# Patient Record
Sex: MALE | Race: WHITE | NOT HISPANIC OR LATINO | Employment: OTHER | URBAN - METROPOLITAN AREA
[De-identification: names, ages, dates, MRNs, and addresses within clinical notes are randomized per-mention and may not be internally consistent; named-entity substitution may affect disease eponyms.]

---

## 2017-01-01 ENCOUNTER — TELEPHONE (OUTPATIENT)
Dept: PULMONOLOGY | Facility: HOSPICE | Age: 77
End: 2017-01-01

## 2017-01-01 ENCOUNTER — OFFICE VISIT (OUTPATIENT)
Dept: CARDIOLOGY | Facility: MEDICAL CENTER | Age: 77
End: 2017-01-01
Payer: COMMERCIAL

## 2017-01-01 ENCOUNTER — HOSPITAL ENCOUNTER (OUTPATIENT)
Dept: RADIOLOGY | Facility: MEDICAL CENTER | Age: 77
End: 2017-10-30
Attending: INTERNAL MEDICINE
Payer: MEDICARE

## 2017-01-01 ENCOUNTER — SLEEP STUDY (OUTPATIENT)
Dept: SLEEP MEDICINE | Facility: MEDICAL CENTER | Age: 77
End: 2017-01-01
Attending: NURSE PRACTITIONER
Payer: MEDICARE

## 2017-01-01 ENCOUNTER — OFFICE VISIT (OUTPATIENT)
Dept: PULMONOLOGY | Facility: HOSPICE | Age: 77
End: 2017-01-01
Payer: MEDICARE

## 2017-01-01 VITALS
TEMPERATURE: 98.6 F | BODY MASS INDEX: 26.86 KG/M2 | WEIGHT: 216 LBS | HEIGHT: 75 IN | OXYGEN SATURATION: 95 % | SYSTOLIC BLOOD PRESSURE: 110 MMHG | DIASTOLIC BLOOD PRESSURE: 64 MMHG | RESPIRATION RATE: 16 BRPM | HEART RATE: 73 BPM

## 2017-01-01 VITALS
OXYGEN SATURATION: 95 % | HEIGHT: 75 IN | SYSTOLIC BLOOD PRESSURE: 104 MMHG | DIASTOLIC BLOOD PRESSURE: 60 MMHG | HEART RATE: 74 BPM

## 2017-01-01 VITALS
SYSTOLIC BLOOD PRESSURE: 122 MMHG | WEIGHT: 215 LBS | TEMPERATURE: 98.5 F | DIASTOLIC BLOOD PRESSURE: 52 MMHG | BODY MASS INDEX: 26.18 KG/M2 | OXYGEN SATURATION: 93 % | HEIGHT: 76 IN | RESPIRATION RATE: 16 BRPM | HEART RATE: 80 BPM

## 2017-01-01 DIAGNOSIS — J45.20 MILD INTERMITTENT ASTHMA WITHOUT COMPLICATION: ICD-10-CM

## 2017-01-01 DIAGNOSIS — J44.9 CHRONIC OBSTRUCTIVE PULMONARY DISEASE, UNSPECIFIED COPD TYPE (HCC): ICD-10-CM

## 2017-01-01 DIAGNOSIS — E78.5 DYSLIPIDEMIA: ICD-10-CM

## 2017-01-01 DIAGNOSIS — J96.11 CHRONIC RESPIRATORY FAILURE WITH HYPOXIA (HCC): ICD-10-CM

## 2017-01-01 DIAGNOSIS — R07.0 THROAT PAIN: ICD-10-CM

## 2017-01-01 DIAGNOSIS — K21.9 GASTROESOPHAGEAL REFLUX DISEASE, ESOPHAGITIS PRESENCE NOT SPECIFIED: ICD-10-CM

## 2017-01-01 DIAGNOSIS — I10 HTN (HYPERTENSION), BENIGN: ICD-10-CM

## 2017-01-01 DIAGNOSIS — I25.119 CORONARY ARTERY DISEASE INVOLVING NATIVE CORONARY ARTERY OF NATIVE HEART WITH ANGINA PECTORIS (HCC): ICD-10-CM

## 2017-01-01 DIAGNOSIS — K22.719 BARRETT'S ESOPHAGUS WITH DYSPLASIA: ICD-10-CM

## 2017-01-01 DIAGNOSIS — I25.84 CORONARY ARTERY DISEASE DUE TO CALCIFIED CORONARY LESION: ICD-10-CM

## 2017-01-01 DIAGNOSIS — R13.19 CONSTANT LOW-GRADE DYSPHAGIA: ICD-10-CM

## 2017-01-01 DIAGNOSIS — I25.10 CORONARY ARTERY DISEASE DUE TO CALCIFIED CORONARY LESION: ICD-10-CM

## 2017-01-01 DIAGNOSIS — G47.33 OSA (OBSTRUCTIVE SLEEP APNEA): ICD-10-CM

## 2017-01-01 DIAGNOSIS — I25.10 CORONARY ARTERY DISEASE DUE TO LIPID RICH PLAQUE: ICD-10-CM

## 2017-01-01 DIAGNOSIS — I25.83 CORONARY ARTERY DISEASE DUE TO LIPID RICH PLAQUE: ICD-10-CM

## 2017-01-01 DIAGNOSIS — I10 ESSENTIAL HYPERTENSION: ICD-10-CM

## 2017-01-01 DIAGNOSIS — J43.9 PULMONARY EMPHYSEMA, UNSPECIFIED EMPHYSEMA TYPE (HCC): ICD-10-CM

## 2017-01-01 DIAGNOSIS — R06.09 DYSPNEA ON EXERTION: ICD-10-CM

## 2017-01-01 PROCEDURE — 74220 X-RAY XM ESOPHAGUS 1CNTRST: CPT

## 2017-01-01 PROCEDURE — 99214 OFFICE O/P EST MOD 30 MIN: CPT | Performed by: NURSE PRACTITIONER

## 2017-01-01 PROCEDURE — 99214 OFFICE O/P EST MOD 30 MIN: CPT | Performed by: INTERNAL MEDICINE

## 2017-01-01 PROCEDURE — 700101 HCHG RX REV CODE 250: Performed by: INTERNAL MEDICINE

## 2017-01-01 PROCEDURE — 95811 POLYSOM 6/>YRS CPAP 4/> PARM: CPT | Performed by: INTERNAL MEDICINE

## 2017-01-01 RX ORDER — SULFAMETHOXAZOLE AND TRIMETHOPRIM 800; 160 MG/1; MG/1
TABLET ORAL
Refills: 0 | COMMUNITY
Start: 2017-06-19 | End: 2018-01-01

## 2017-01-01 RX ORDER — ROSUVASTATIN CALCIUM 40 MG/1
40 TABLET, COATED ORAL DAILY
Qty: 90 TAB | Refills: 3 | Status: SHIPPED | OUTPATIENT
Start: 2017-01-01

## 2017-01-01 RX ORDER — FUROSEMIDE 80 MG
80 TABLET ORAL
Refills: 3 | COMMUNITY
Start: 2017-05-08 | End: 2018-01-01

## 2017-01-01 RX ORDER — MULTIVIT-MIN/IRON/FOLIC ACID/K 18-600-40
2000 CAPSULE ORAL EVERY EVENING
COMMUNITY

## 2017-01-01 RX ORDER — POTASSIUM CHLORIDE 750 MG/1
TABLET, FILM COATED, EXTENDED RELEASE ORAL
Refills: 3 | COMMUNITY
Start: 2017-05-04 | End: 2018-01-01

## 2017-01-01 RX ORDER — LEVALBUTEROL TARTRATE 45 UG/1
1 AEROSOL, METERED ORAL EVERY 4 HOURS PRN
Refills: 1 | COMMUNITY
Start: 2017-05-17 | End: 2018-01-01

## 2017-01-01 RX ORDER — METHYLPREDNISOLONE 4 MG/1
TABLET ORAL
Qty: 21 TAB | Refills: 0 | Status: SHIPPED | OUTPATIENT
Start: 2017-01-01 | End: 2017-01-01

## 2017-01-01 RX ORDER — BENAZEPRIL HYDROCHLORIDE 20 MG/1
20 TABLET ORAL EVERY EVENING
Qty: 90 TAB | Refills: 3 | Status: SHIPPED | OUTPATIENT
Start: 2017-01-01

## 2017-01-01 RX ADMIN — BARIUM SULFATE 700 MG: 700 TABLET ORAL at 14:30

## 2017-01-01 ASSESSMENT — MINNESOTA LIVING WITH HEART FAILURE QUESTIONNAIRE (MLHF)
TOTAL_SCORE: 48
LOSS OF SELF CONTROL IN YOUR LIFE: 2
MAKING YOU SHORT OF BREATH: 4
DIFFICULTY SOCIALIZING WITH FAMILY OR FRIENDS: 2
MAKING YOU STAY IN A HOSPITAL: 0
DIFFICULTY WITH SEXUAL ACTIVITIES: 0
MAKING YOU WORRY: 2
DIFFICULTY WITH RECREATIONAL PASTIMES, SPORTS, HOBBIES: 0
FEELING LIKE A BURDEN TO FAMILY AND FRIENDS: 1
WORKING AROUND THE HOUSE OR YARD DIFFICULT: 4
WALKING ABOUT OR CLIMBING STAIRS DIFFICULT: 4
HAVING TO SIT OR LIE DOWN DURING THE DAY: 3
DIFFICULTY TO CONCENTRATE OR REMEMBERING THINGS: 3
DIFFICULTY SLEEPING WELL AT NIGHT: 4
DIFFICULTY GOING AWAY FROM HOME: 4
GIVING YOU SIDE EFFECTS FROM TREATMENTS: 3
SWELLING IN ANKLES OR LEGS: 5
EATING LESS FOODS YOU LIKE: 0
TIRED, FATIGUED OR LOW ON ENERGY: 4
DIFFICULTY WORKING TO EARN A LIVING: 0
COSTING YOU MONEY FOR MEDICAL CARE: 3
MAKING YOU FEEL DEPRESSED: 0

## 2017-03-02 ENCOUNTER — TELEPHONE (OUTPATIENT)
Dept: CARDIOLOGY | Facility: MEDICAL CENTER | Age: 77
End: 2017-03-02

## 2017-03-02 NOTE — TELEPHONE ENCOUNTER
----- Message from Glo Ballard sent at 3/2/2017  2:10 PM PST -----  Regarding: new meds - advice  Contact: [phone]  MI/charity    Pt calling to ask for new medication advice (several new meds) & if he can safely take them with his cardiac meds.  Please call Abbe at

## 2017-03-02 NOTE — TELEPHONE ENCOUNTER
Called patient. He wanted to let Dr. Dixon know that he was recently prescribed Ibuprofen and Bactrim for an infection in his prostate.    He also has a prescription for a Breo Ellipta inhaler from his pulmonologist. He read the packet that came with the medication, and it said that he shouldn't use the Breo Ellipta inhaler with beta blockers. Patient would like to know if Dr. Dixon thinks that he can safely take Breo Ellipta and his beta blocker (Metoprolol Succinate) together.    Forwarded to Dr. Dixon, please advise. Thank you.    RICH VILLA

## 2017-03-09 ENCOUNTER — OFFICE VISIT (OUTPATIENT)
Dept: CARDIOLOGY | Facility: MEDICAL CENTER | Age: 77
End: 2017-03-09
Payer: COMMERCIAL

## 2017-03-09 ENCOUNTER — TELEPHONE (OUTPATIENT)
Dept: CARDIOLOGY | Facility: MEDICAL CENTER | Age: 77
End: 2017-03-09

## 2017-03-09 VITALS
DIASTOLIC BLOOD PRESSURE: 70 MMHG | SYSTOLIC BLOOD PRESSURE: 130 MMHG | HEART RATE: 76 BPM | HEIGHT: 76 IN | BODY MASS INDEX: 26.55 KG/M2 | WEIGHT: 218 LBS

## 2017-03-09 DIAGNOSIS — I25.10 CORONARY ARTERY DISEASE DUE TO CALCIFIED CORONARY LESION: ICD-10-CM

## 2017-03-09 DIAGNOSIS — E78.5 DYSLIPIDEMIA: ICD-10-CM

## 2017-03-09 DIAGNOSIS — I10 HTN (HYPERTENSION), BENIGN: ICD-10-CM

## 2017-03-09 DIAGNOSIS — I25.84 CORONARY ARTERY DISEASE DUE TO CALCIFIED CORONARY LESION: ICD-10-CM

## 2017-03-09 PROCEDURE — 99214 OFFICE O/P EST MOD 30 MIN: CPT | Performed by: INTERNAL MEDICINE

## 2017-03-09 RX ORDER — BISOPROLOL FUMARATE 5 MG/1
5 TABLET, FILM COATED ORAL DAILY
Qty: 30 TAB | Refills: 11 | Status: SHIPPED | OUTPATIENT
Start: 2017-03-09 | End: 2018-01-01 | Stop reason: SDUPTHER

## 2017-03-09 NOTE — TELEPHONE ENCOUNTER
Mailed patient a letter regarding his medication changes (patient is to discontinue HCTZ and Metoprolol SR, he is to start taking Bisoprolol 5 mg daily).    Left patient a voicemail regarding the above, advised patient via voicemail to call the office with questions.    Called [facility] to discontinue HCTZ and Metoprolol.    RICH VILLA

## 2017-03-09 NOTE — PROGRESS NOTES
Subjective:   Abbe Whitman is a 76 y.o. male who presents today for followup of his coronary artery disease with recurrent pleural effusions, hypertension and hyperlipidemia. He did undergo right pleurodesis. He did develop a left pleural effusion which ultimately caused some chest discomfort and led to an admission at the end of March 2015.    He continues to have difficulty with COPD and asthma. He is concerned about taking Ventolin inhaler along with his beta blocker therapy. He's also had significant difficulty with prostatism and is now on an antibiotic in addition to Flomax. His prostatism has improved somewhat but he has difficulty taking ACTZ because of frequent urination.    He continues to have difficulty with edema. He has dyspnea on exertion about 100 yards at a good day. He's had no PND or orthopnea. He's noted no palpitations or lightheadedness. He does occasionally note some flushing.    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   • CAD (coronary artery disease) 8/31/2011   • Dyslipidemia 8/31/2011   • Ramsay esophagus    • GERD (gastroesophageal reflux disease)    • Dyspnea on exertion 2/19/2013   • Pleural effusion 7/1/2013   • Hypertension    • HTN (hypertension) 8/31/2011   • HTN (hypertension) 2013     pt states well controlled   • Myocardial infarct 2000   • Arrhythmia      a-fib occas   • Heart burn    • Other specified disorder of intestines 2013     constipation needs miralax daily   • CATARACT 2013   • Cold 10/20     12 hrs of diarrhea    • Indigestion    • Unspecified urinary incontinence    • Atypical chest pain 3/16/2015     Past Surgical History   Procedure Laterality Date   • Cataract extraction       both eyes   • Other       right knee orthoscopic    • Multiple coronary artery bypass endo vein harvest  10/13/2009     Performed by JULIA ALCANTARA at SURGERY [facility]   • Maze procedure  10/13/2009      Performed by JULIA ALCANTARA at SURGERY [facility]   • Biopsy general  10/13/2009     Performed by JULIA ALCANTARA at SURGERY [facility]   • Angiogram  2009   • Inguinal hernia repair  9/15/2010     Performed by GRIS GARCÍA at SURGERY [facility]   • Thoracoscopy  10/29/2013     Performed by John H Ganser, M.D. at SURGERY [facility]   • Other cardiac surgery       Family History   Problem Relation Age of Onset   • Other Mother      afib   • Heart Attack Maternal Uncle      History   Smoking status   • Never Smoker    Smokeless tobacco   • Never Used     Allergies   Allergen Reactions   • Prednisone      Swelling in throat   • Ciprofloxacin      Per patient makes skin peel    • Latex      Son says slight skin reaction     Outpatient Encounter Prescriptions as of 3/9/2017   Medication Sig Dispense Refill   • benazepril (LOTENSIN) 20 MG Tab Take 1 Tab by mouth every evening. 90 Tab 3   • hydrochlorothiazide (HYDRODIURIL) 12.5 MG tablet Take 1 Tab by mouth every day. 90 Tab 3   • metoprolol SR (TOPROL XL) 50 MG TABLET SR 24 HR Take 1.5 Tabs by mouth every day. 135 Tab 3   • rosuvastatin (CRESTOR) 40 MG tablet Take 1 Tab by mouth every day. 90 Tab 3   • nitroglycerin (NITROSTAT) 0.4 MG SL Tab Place 1 Tab under tongue as needed for Chest Pain. 25 Tab 5   • Albuterol Sulfate 108 (90 BASE) MCG/ACT AEROSOL POWDER, BREATH ACTIVATED Inhale 90 mcg by mouth every four hours as needed (sob or wheezing). 1 Each 5   • Mometasone Furo-Formoterol Fum (DULERA) 100-5 MCG/ACT Aerosol Inhale 2 Puffs by mouth 2 Times a Day. Use spacer. Rinse mouth after use. 1 Inhaler 0   • Tiotropium Bromide Monohydrate (SPIRIVA RESPIMAT) 1.25 MCG/ACT Aero Soln Inhale 2 Inhalation by mouth every day. 1 Inhaler 5   • aspirin (ASA) 81 MG Chew Tab chewable tablet Take 1 Tab by mouth every day. 90 Tab 3   • lansoprazole (PREVACID) 30 MG CPDR Take 30 mg by mouth every day.     • tamsulosin (FLOMAX) 0.4 MG capsule Take 0.4 mg by mouth 2  "Times a Day.       Facility-Administered Encounter Medications as of 3/9/2017   Medication Dose Route Frequency Provider Last Rate Last Dose   • albuterol (VENTOLIN or PROVENTIL) inhaler 2 Puff  2 Puff Inhalation Q4HRS KALE Jacinto       Objective:   /70 mmHg  Pulse 76  Ht 1.93 m (6' 4\")  Wt 98.884 kg (218 lb)  BMI 26.55 kg/m2    Physical Exam   Neck: No JVD present.   Cardiovascular: Normal rate and regular rhythm.  Exam reveals no gallop.    No murmur heard.  Pulmonary/Chest: Effort normal. He has no rales.   Abdominal: Soft. There is no tenderness.   Musculoskeletal: He exhibits edema (1-2+ pretibial).     Myocardial perfusion scan:  IMPRESSIONS  Normal left ventricular size, ejection fraction, and wall motion.  No evidence of significant jeopardized viable myocardium or prior myocardial   Infarction.  Normal left ventricular wall motion. LV ejection fraction = 53%.  Exam Date: 03/15/2015 10:37    Echo:  CONCLUSIONS  Technically difficult study.   Mildly reduced left ventricular systolic function.  Left ventricular ejection fraction is 45% to 50%.  Mild mitral regurgitation.  Moderate tricuspid regurgitation.  Right ventricular systolic pressure is estimated to be 40 to 45 mmHg.  Exam Date: 03/15/2015     Laboratory from August 22: Laboratory from February 23: Sodium 140, potassium 3.2, bilirubin 1.8. BUN 18 and creatinine 1.3.    Laboratory from December 2016: Total cholesterol 89, triglycerides 28, HDL 36 and LDL 47    Assessment:     1. Coronary artery disease due to calcified coronary lesion: CABG ×5 in 2009     2. HTN (hypertension), benign     3. Dyslipidemia         Medical Decision Making:  Today's Assessment / Status / Plan:     Mr. Whitman continues to have difficulty with dyspnea from his asthma. He is also having difficulty with frequent urination and prostatism. At this time, we will discontinue hydrochlorothiazide and change metoprolol to bisoprolol 5 mg a " day. His lipid status appears to be under good control. He will call if his blood pressures recently greater than 140/85. He will otherwise follow-up in about 2 months. We also discussed a low level walking program.

## 2017-03-09 NOTE — MR AVS SNAPSHOT
"Abbe Whitman   3/9/2017 10:30 AM   Office Visit   MRN: [id number]    Department:  [facility]   Dept Phone:  [phone]    Description:  Male : 1940   Provider:  Armin Dixon M.D.           Reason for Visit     Follow-Up           Allergies as of 3/9/2017     Allergen Noted Reactions    Prednisone 2012       Swelling in throat    Ciprofloxacin 2016       Per patient makes skin peel     Latex 10/29/2013       Son says slight skin reaction      You were diagnosed with     Coronary artery disease due to calcified coronary lesion   [4858829]       HTN (hypertension), benign   [369554]       Dyslipidemia   [209218]         Vital Signs     Blood Pressure Pulse Height Weight Body Mass Index Smoking Status    130/70 mmHg 76 1.93 m (6' 4\") 98.884 kg (218 lb) 26.55 kg/m2 Never Smoker       Basic Information     Date Of Birth Sex Race Ethnicity Preferred Language    1940 Male White Non- English      Your appointments     May 17, 2017  8:15 AM   FOLLOW UP with Armin Dixon M.D.   [facility] (--)    [address], [address]  [facility] [address]   [phone]              Problem List              ICD-10-CM Priority Class Noted - Resolved    Coronary artery disease due to calcified coronary lesion: CABG ×5 in  I25.10, I25.84   2011 - Present    HTN (hypertension), benign I10   2011 - Present    Dyslipidemia E78.5   2011 - Present    Dyspnea on exertion R06.09   2013 - Present    Pleural effusion J90   2013 - Present    GERD (gastroesophageal reflux disease) (Chronic) K21.9   Unknown - Present    Arrhythmia (Chronic) I49.9   Unknown - Present    Myocardial infarct (Chronic) I21.3   Unknown - Present    Pleural effusion J90   10/29/2013 - Present    Chest pain R07.9   3/14/2015 - Present    Hypokalemia E87.6   3/16/2015 - Present    Atypical chest pain R07.89   3/16/2015 - Present    Mild intermittent " asthma without complication J45.20   4/19/2016 - Present      Health Maintenance        Date Due Completion Dates    IMM DTaP/Tdap/Td Vaccine (1 - Tdap) 6/26/1961 ---    COLONOSCOPY 6/26/1992 ---    IMM ZOSTER VACCINE 6/26/2002 ---    IMM PNEUMOCOCCAL 65+ (ADULT) LOW/MEDIUM RISK SERIES (1 of 2 - PCV13) 6/26/2007 ---    IMM INFLUENZA (1) 9/1/2016 ---            Current Immunizations     No immunizations on file.      Below and/or attached are the medications your provider expects you to take. Review all of your home medications and newly ordered medications with your provider and/or pharmacist. Follow medication instructions as directed by your provider and/or pharmacist. Please keep your medication list with you and share with your provider. Update the information when medications are discontinued, doses are changed, or new medications (including over-the-counter products) are added; and carry medication information at all times in the event of emergency situations     Allergies:  PREDNISONE - (reactions not documented)     CIPROFLOXACIN - (reactions not documented)     LATEX - (reactions not documented)               Medications  Valid as of: March 09, 2017 - 11:15 AM    Generic Name Brand Name Tablet Size Instructions for use    Albuterol Sulfate (AEROSOL POWDER, BREATH ACTIVATED) Albuterol Sulfate 108 (90 BASE) MCG/ACT Inhale 90 mcg by mouth every four hours as needed (sob or wheezing).        Aspirin (Chew Tab) ASA 81 MG Take 1 Tab by mouth every day.        Benazepril HCl (Tab) LOTENSIN 20 MG Take 1 Tab by mouth every evening.        Bisoprolol Fumarate (Tab) ZEBETA 5 MG Take 1 Tab by mouth every day.        Lansoprazole (CAPSULE DELAYED RELEASE) PREVACID 30 MG Take 30 mg by mouth every day.        Mometasone Furo-Formoterol Fum (Aerosol) Mometasone Furo-Formoterol Fum 100-5 MCG/ACT Inhale 2 Puffs by mouth 2 Times a Day. Use spacer. Rinse mouth after use.        Nitroglycerin (SL Tab) NITROSTAT 0.4 MG Place 1  Tab under tongue as needed for Chest Pain.        Rosuvastatin Calcium (Tab) CRESTOR 40 MG Take 1 Tab by mouth every day.        Tamsulosin HCl (Cap) FLOMAX 0.4 MG Take 0.4 mg by mouth 2 Times a Day.        Tiotropium Bromide Monohydrate (Aero Soln) Tiotropium Bromide Monohydrate 1.25 MCG/ACT Inhale 2 Inhalation by mouth every day.        .                 Medicines prescribed today were sent to:     [facility]ARMACIST AT [facility]. - [address] [address]    Phone: [phone] Fax: [phone]    Open 24 Hours?: No      Medication refill instructions:       If your prescription bottle indicates you have medication refills left, it is not necessary to call your provider’s office. Please contact your pharmacy and they will refill your medication.    If your prescription bottle indicates you do not have any refills left, you may request refills at any time through one of the following ways: The online [employer] system (except Urgent Care), by calling your provider’s office, or by asking your pharmacy to contact your provider’s office with a refill request. Medication refills are processed only during regular business hours and may not be available until the next business day. Your provider may request additional information or to have a follow-up visit with you prior to refilling your medication.   *Please Note: Medication refills are assigned a new Rx number when refilled electronically. Your pharmacy may indicate that no refills were authorized even though a new prescription for the same medication is available at the pharmacy. Please request the medicine by name with the pharmacy before contacting your provider for a refill.           [employer] Access Code: [email or web address]  Expires: 4/8/2017 10:13 AM    [employer]  A secure, online tool to manage your health information     [employer]’s [employer]® is a secure, online tool that connects you to your personalized health information  from the privacy of your home -- day or night - making it very easy for you to manage your healthcare. Once the activation process is completed, you can even access your medical information using the AnyPerk lukas, which is available for free in the [employer] Lukas store or Google Play store.     AnyPerk provides the following levels of access (as shown below):   My Chart Features   [facility] Doctor [facility]  Specialists [facility]  Urgent  Care Non-[facility]  Primary Care  Doctor   Email your healthcare team securely and privately 24/7 X X X    Manage appointments: schedule your next appointment; view details of past/upcoming appointments X      Request prescription refills. X      View recent personal medical records, including lab and immunizations X X X X   View health record, including health history, allergies, medications X X X X   Read reports about your outpatient visits, procedures, consult and ER notes X X X X   See your discharge summary, which is a recap of your hospital and/or ER visit that includes your diagnosis, lab results, and care plan. X X       How to register for AnyPerk:  1. Go to  [email or web address].  2. Click on the Sign Up Now box, which takes you to the New Member Sign Up page. You will need to provide the following information:  a. Enter your AnyPerk Access Code exactly as it appears at the top of this page. (You will not need to use this code after you’ve completed the sign-up process. If you do not sign up before the expiration date, you must request a new code.)   b. Enter your date of birth.   c. Enter your home email address.   d. Click Submit, and follow the next screen’s instructions.  3. Create a AnyPerk ID. This will be your AnyPerk login ID and cannot be changed, so think of one that is secure and easy to remember.  4. Create a AnyPerk password. You can change your password at any time.  5. Enter your Password Reset Question and Answer. This can be used at a later time if you  forget your password.   6. Enter your e-mail address. This allows you to receive e-mail notifications when new information is available in [facility].  7. Click Sign Up. You can now view your health information.    For assistance activating your [facility]) account, call [phone]

## 2017-03-09 NOTE — Clinical Note
March 9, 2017    Medication Instructions for Abbe Whitman:    Discontinue Hydrochlorothiazide    Discontinue Metoprolol Succinate    Start Bisoprolol 5 mg daily    If you have any questions, please feel free to call the office at [phone]. Thank you and have a good day.        [facility]

## 2017-03-09 NOTE — Clinical Note
[facility]   [address]  [place] [address]  Phone: [phone]  Fax: [phone]              Abbe Whitman  1940    Encounter Date: 3/9/2017    Armin Dixon M.D.          PROGRESS NOTE:  Subjective:   Abbe Whitman is a 76 y.o. male who presents today for followup of his coronary artery disease with recurrent pleural effusions, hypertension and hyperlipidemia. He did undergo right pleurodesis. He did develop a left pleural effusion which ultimately caused some chest discomfort and led to an admission at the end of March 2015.    He continues to have difficulty with COPD and asthma. He is concerned about taking Ventolin inhaler along with his beta blocker therapy. He's also had significant difficulty with prostatism and is now on an antibiotic in addition to Flomax. His prostatism has improved somewhat but he has difficulty taking ACTZ because of frequent urination.    He continues to have difficulty with edema. He has dyspnea on exertion about 100 yards at a good day. He's had no PND or orthopnea. He's noted no palpitations or lightheadedness. He does occasionally note some flushing.    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   • CAD (coronary artery disease) 8/31/2011   • Dyslipidemia 8/31/2011   • Ramsay esophagus    • GERD (gastroesophageal reflux disease)    • Dyspnea on exertion 2/19/2013   • Pleural effusion 7/1/2013   • Hypertension    • HTN (hypertension) 8/31/2011   • HTN (hypertension) 2013     pt states well controlled   • Myocardial infarct 2000   • Arrhythmia      a-fib occas   • Heart burn    • Other specified disorder of intestines 2013     constipation needs miralax daily   • CATARACT 2013   • Cold 10/20     12 hrs of diarrhea    • Indigestion    • Unspecified urinary incontinence    • Atypical chest pain 3/16/2015     Past Surgical History      Procedure Laterality Date   • Cataract extraction       both eyes   • Other       right knee orthoscopic    • Multiple coronary artery bypass endo vein harvest  10/13/2009     Performed by JULIA ALCANTARA at SURGERY [facility]   • Maze procedure  10/13/2009     Performed by JULIA ALCANTARA at SURGERY [facility]   • Biopsy general  10/13/2009     Performed by JULIA ALCANTARA at SURGERY [facility]   • Angiogram  2009   • Inguinal hernia repair  9/15/2010     Performed by GRIS GARCÍA at SURGERY [facility]   • Thoracoscopy  10/29/2013     Performed by John H Ganser, M.D. at SURGERY [facility]   • Other cardiac surgery       Family History   Problem Relation Age of Onset   • Other Mother      afib   • Heart Attack Maternal Uncle      History   Smoking status   • Never Smoker    Smokeless tobacco   • Never Used     Allergies   Allergen Reactions   • Prednisone      Swelling in throat   • Ciprofloxacin      Per patient makes skin peel    • Latex      Son says slight skin reaction     Outpatient Encounter Prescriptions as of 3/9/2017   Medication Sig Dispense Refill   • benazepril (LOTENSIN) 20 MG Tab Take 1 Tab by mouth every evening. 90 Tab 3   • hydrochlorothiazide (HYDRODIURIL) 12.5 MG tablet Take 1 Tab by mouth every day. 90 Tab 3   • metoprolol SR (TOPROL XL) 50 MG TABLET SR 24 HR Take 1.5 Tabs by mouth every day. 135 Tab 3   • rosuvastatin (CRESTOR) 40 MG tablet Take 1 Tab by mouth every day. 90 Tab 3   • nitroglycerin (NITROSTAT) 0.4 MG SL Tab Place 1 Tab under tongue as needed for Chest Pain. 25 Tab 5   • Albuterol Sulfate 108 (90 BASE) MCG/ACT AEROSOL POWDER, BREATH ACTIVATED Inhale 90 mcg by mouth every four hours as needed (sob or wheezing). 1 Each 5   • Mometasone Furo-Formoterol Fum (DULERA) 100-5 MCG/ACT Aerosol Inhale 2 Puffs by mouth 2 Times a Day. Use spacer. Rinse mouth after use. 1 Inhaler 0   • Tiotropium Bromide Monohydrate (SPIRIVA RESPIMAT) 1.25 MCG/ACT Aero Soln Inhale  "2 Inhalation by mouth every day. 1 Inhaler 5   • aspirin (ASA) 81 MG Chew Tab chewable tablet Take 1 Tab by mouth every day. 90 Tab 3   • lansoprazole (PREVACID) 30 MG CPDR Take 30 mg by mouth every day.     • tamsulosin (FLOMAX) 0.4 MG capsule Take 0.4 mg by mouth 2 Times a Day.       Facility-Administered Encounter Medications as of 3/9/2017   Medication Dose Route Frequency Provider Last Rate Last Dose   • albuterol (VENTOLIN or PROVENTIL) inhaler 2 Puff  2 Puff Inhalation Q4HRS PRN KALE Amos       Objective:   /70 mmHg  Pulse 76  Ht 1.93 m (6' 4\")  Wt 98.884 kg (218 lb)  BMI 26.55 kg/m2    Physical Exam   Neck: No JVD present.   Cardiovascular: Normal rate and regular rhythm.  Exam reveals no gallop.    No murmur heard.  Pulmonary/Chest: Effort normal. He has no rales.   Abdominal: Soft. There is no tenderness.   Musculoskeletal: He exhibits edema (1-2+ pretibial).     Myocardial perfusion scan:  IMPRESSIONS  Normal left ventricular size, ejection fraction, and wall motion.  No evidence of significant jeopardized viable myocardium or prior myocardial   Infarction.  Normal left ventricular wall motion. LV ejection fraction = 53%.  Exam Date: 03/15/2015 10:37    Echo:  CONCLUSIONS  Technically difficult study.   Mildly reduced left ventricular systolic function.  Left ventricular ejection fraction is 45% to 50%.  Mild mitral regurgitation.  Moderate tricuspid regurgitation.  Right ventricular systolic pressure is estimated to be 40 to 45 mmHg.  Exam Date: 03/15/2015     Laboratory from August 22: Laboratory from February 23: Sodium 140, potassium 3.2, bilirubin 1.8. BUN 18 and creatinine 1.3.    Laboratory from December 2016: Total cholesterol 89, triglycerides 28, HDL 36 and LDL 47    Assessment:     1. Coronary artery disease due to calcified coronary lesion: CABG ×5 in 2009     2. HTN (hypertension), benign     3. Dyslipidemia         Medical Decision Making:  Today's " Assessment / Status / Plan:     Mr. Whitman continues to have difficulty with dyspnea from his asthma. He is also having difficulty with frequent urination and prostatism. At this time, we will discontinue hydrochlorothiazide and change metoprolol to bisoprolol 5 mg a day. His lipid status appears to be under good control. He will call if his blood pressures recently greater than 140/85. He will otherwise follow-up in about 2 months. We also discussed a low level walking program.      Skyla Foy M.D.  [address] [place] [id number]  VIA Facsimile: [phone]

## 2017-03-28 ENCOUNTER — TELEPHONE (OUTPATIENT)
Dept: CARDIOLOGY | Facility: MEDICAL CENTER | Age: 77
End: 2017-03-28

## 2017-03-28 DIAGNOSIS — I51.9 LEFT VENTRICULAR SYSTOLIC DYSFUNCTION: ICD-10-CM

## 2017-03-28 RX ORDER — FUROSEMIDE 20 MG/1
20 TABLET ORAL DAILY
COMMUNITY
Start: 2017-03-28 | End: 2017-05-17

## 2017-03-28 NOTE — TELEPHONE ENCOUNTER
----- Message from Glo Ballard sent at 3/27/2017  3:16 PM PDT -----  Regarding: furosemide  Contact: [phone]  MI/charity    Pt calling to report a visit to [facility] E/R on 3/25 with episode of sob & nearly passing out.   Pt was prescribed furosemide 20 mg 1 x daily, by Dr Miguel Angel Lion after testing determined it was bronchitis.      Pt wants approval to take this. Please call pt to discuss  or cell .

## 2017-03-28 NOTE — TELEPHONE ENCOUNTER
Reviewed [facility] discharge summary from 3/26/2017. Patient was prescribed Doxycycline x 5 days for bronchitis, and was prescribed Lasix 20 mg daily for BLE edema.    Forwarded to Dr. Dixon, please advise if patient can continue Lasix 20 mg daily.    RICH VILLA

## 2017-03-29 ENCOUNTER — OFFICE VISIT (OUTPATIENT)
Dept: CARDIOLOGY | Facility: MEDICAL CENTER | Age: 77
End: 2017-03-29
Payer: COMMERCIAL

## 2017-03-29 VITALS
SYSTOLIC BLOOD PRESSURE: 130 MMHG | BODY MASS INDEX: 27.6 KG/M2 | OXYGEN SATURATION: 92 % | WEIGHT: 222 LBS | DIASTOLIC BLOOD PRESSURE: 80 MMHG | HEART RATE: 76 BPM | HEIGHT: 75 IN

## 2017-03-29 DIAGNOSIS — R06.09 DYSPNEA ON EXERTION: ICD-10-CM

## 2017-03-29 DIAGNOSIS — I25.84 CORONARY ARTERY DISEASE DUE TO CALCIFIED CORONARY LESION: ICD-10-CM

## 2017-03-29 DIAGNOSIS — E78.5 DYSLIPIDEMIA: ICD-10-CM

## 2017-03-29 DIAGNOSIS — I50.22 CHRONIC SYSTOLIC CONGESTIVE HEART FAILURE (HCC): ICD-10-CM

## 2017-03-29 DIAGNOSIS — J45.20 MILD INTERMITTENT ASTHMA WITHOUT COMPLICATION: ICD-10-CM

## 2017-03-29 DIAGNOSIS — I25.10 CORONARY ARTERY DISEASE DUE TO CALCIFIED CORONARY LESION: ICD-10-CM

## 2017-03-29 DIAGNOSIS — I10 HTN (HYPERTENSION), BENIGN: ICD-10-CM

## 2017-03-29 DIAGNOSIS — R60.0 LOCALIZED EDEMA: ICD-10-CM

## 2017-03-29 PROCEDURE — 99214 OFFICE O/P EST MOD 30 MIN: CPT | Mod: 29 | Performed by: NURSE PRACTITIONER

## 2017-03-29 ASSESSMENT — ENCOUNTER SYMPTOMS
PND: 0
ORTHOPNEA: 1
PALPITATIONS: 0
CLAUDICATION: 0
WEAKNESS: 0
COUGH: 0
ABDOMINAL PAIN: 0
MYALGIAS: 0
SHORTNESS OF BREATH: 1
DIZZINESS: 0

## 2017-03-29 NOTE — TELEPHONE ENCOUNTER
Called patient and advised him of Dr. Dixon's instructions. Patient verbalized understanding and is thankful for the call. He is scheduled for a hospital follow up tomorrow (3/29/2017).    MPI report and echocardiogram report from 3/26/2017 are in media file.    RICH VILLA

## 2017-03-29 NOTE — PROGRESS NOTES
"Subjective:   Abbe \"Ed\" Remington Whitman is a 76 y.o. male who presents today for hospital follow-up. He developed worsening shortness of breath and became concerned therefore he went to the emergency room at [facility]. He had some hypoxia therefore he was admitted. Pulmonary embolism was ruled out. It was felt that he had bronchitis, asthma and some minor heart failure. His ejection fraction was slightly less per echo at 40-45% he was prescribed Lasix 20 mg but did not started until he was instructed by our office.    He continues to have shortness of breath with exertion which he relates to his asthma. His breathing has improved. He was given anti-biotics and Ventolin. He continues to have lower extremity edema and admits to eating salty foods. Currently, he is sleeping in a recliner with the head elevated due to some respiratory secretions. He states he is able to lie flat.    He denies any palpitations or anginal symptoms.    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   • CAD (coronary artery disease) 8/31/2011   • Dyslipidemia 8/31/2011   • Ramsay esophagus    • GERD (gastroesophageal reflux disease)    • Dyspnea on exertion 2/19/2013   • Pleural effusion 7/1/2013   • Hypertension    • HTN (hypertension) 8/31/2011   • HTN (hypertension) 2013     pt states well controlled   • Myocardial infarct (CMS-[facility]) 2000   • Arrhythmia      a-fib occas   • Heart burn    • Other specified disorder of intestines 2013     constipation needs miralax daily   • CATARACT 2013   • Cold 10/20     12 hrs of diarrhea    • Indigestion    • Unspecified urinary incontinence    • Atypical chest pain 3/16/2015     Past Surgical History   Procedure Laterality Date   • Cataract extraction       both eyes   • Other       right knee orthoscopic    • Multiple coronary artery bypass endo vein harvest  10/13/2009     Performed by UJLIA ALCANTARA at SURGERY " [facility]S   • Maze procedure  10/13/2009     Performed by JULIA ALCANTARA at SURGERY [facility]   • Biopsy general  10/13/2009     Performed by JULIA ALCANTARA at SURGERY [facility]   • Angiogram  2009   • Inguinal hernia repair  9/15/2010     Performed by GRIS GARCÍA at SURGERY [facility] OR[facility]   • Thoracoscopy  10/29/2013     Performed by John H Ganser, M.D. at SURGERY [facility]   • Other cardiac surgery       Family History   Problem Relation Age of Onset   • Other Mother      afib   • Heart Attack Maternal Uncle      History   Smoking status   • Never Smoker    Smokeless tobacco   • Never Used     Allergies   Allergen Reactions   • Prednisone      Swelling in throat   • Ciprofloxacin      Per patient makes skin peel    • Dairy Aid [Lactase]      LACTOSE INTOLERANT   • Gluten Meal      BLOATED AND SICK   • Latex      Son says slight skin reaction     Outpatient Encounter Prescriptions as of 3/29/2017   Medication Sig Dispense Refill   • furosemide (LASIX) 20 MG Tab Take 1 Tab by mouth every day.     • bisoprolol (ZEBETA) 5 MG Tab Take 1 Tab by mouth every day. 30 Tab 11   • benazepril (LOTENSIN) 20 MG Tab Take 1 Tab by mouth every evening. 90 Tab 3   • rosuvastatin (CRESTOR) 40 MG tablet Take 1 Tab by mouth every day. 90 Tab 3   • nitroglycerin (NITROSTAT) 0.4 MG SL Tab Place 1 Tab under tongue as needed for Chest Pain. 25 Tab 5   • Albuterol Sulfate 108 (90 BASE) MCG/ACT AEROSOL POWDER, BREATH ACTIVATED Inhale 90 mcg by mouth every four hours as needed (sob or wheezing). 1 Each 5   • Mometasone Furo-Formoterol Fum (DULERA) 100-5 MCG/ACT Aerosol Inhale 2 Puffs by mouth 2 Times a Day. Use spacer. Rinse mouth after use. 1 Inhaler 0   • Tiotropium Bromide Monohydrate (SPIRIVA RESPIMAT) 1.25 MCG/ACT Aero Soln Inhale 2 Inhalation by mouth every day. 1 Inhaler 5   • aspirin (ASA) 81 MG Chew Tab chewable tablet Take 1 Tab by mouth every day. 90 Tab 3   • lansoprazole (PREVACID) 30 MG CPDR Take 30  "mg by mouth every day.     • tamsulosin (FLOMAX) 0.4 MG capsule Take 0.4 mg by mouth 2 Times a Day.       Facility-Administered Encounter Medications as of 3/29/2017   Medication Dose Route Frequency Provider Last Rate Last Dose   • albuterol (VENTOLIN or PROVENTIL) inhaler 2 Puff  2 Puff Inhalation Q4HRS PRN ETHEL Amos.P.R.N.         Review of Systems   Constitutional: Negative for malaise/fatigue.   Respiratory: Positive for shortness of breath (exertion). Negative for cough.    Cardiovascular: Positive for orthopnea (sleeping in recliner now since bronchitis.) and leg swelling. Negative for chest pain, palpitations, claudication and PND.   Gastrointestinal: Negative for abdominal pain.   Musculoskeletal: Negative for myalgias.   Neurological: Negative for dizziness and weakness.        Objective:   /80 mmHg  Pulse 76  Ht 1.905 m (6' 3\")  Wt 100.699 kg (222 lb)  BMI 27.75 kg/m2  SpO2 92%    Physical Exam   Constitutional: He is oriented to person, place, and time. He appears well-developed and well-nourished.   HENT:   Head: Normocephalic.   Eyes: Conjunctivae are normal.   Neck: No JVD present. No thyromegaly present.   Cardiovascular: Normal rate, regular rhythm and normal heart sounds.    Pulmonary/Chest: Effort normal and breath sounds normal. He has no wheezes. He has no rales.   Abdominal: Soft. Bowel sounds are normal. He exhibits no distension. There is no tenderness.   Musculoskeletal: He exhibits edema (2+ pitting pretibial edema bilaterally.).   Neurological: He is alert and oriented to person, place, and time.   Skin: Skin is warm and dry.   Psychiatric: He has a normal mood and affect.     March 26, 2017: Echocardiogram showed ejection fraction 40-45% with global hypokinesis. Grade 1 diastolic dysfunction. Mild to moderate mitral regurgitation. Moderate tricuspid regurgitation with RVSP 54 mmHg.    March 26, 2017: Nuclear stress test showed ejection fraction 45% with no " significant ischemia.    March 25, 2017: Lipids: Cholesterol 85, triglycerides 44, HDL 26, LDL 51.      Assessment:     1. Dyspnea on exertion     2. Mild intermittent asthma without complication     3. Chronic systolic congestive heart failure (CMS-HCC)      March 26, 2017: Echocardiogram with ejection fraction 40-45%.   4. HTN (hypertension), benign     5. Localized edema     6. Coronary artery disease due to calcified coronary lesion: CABG ×5 in 2009     7. Dyslipidemia         Medical Decision Making:  Today's Assessment / Status / Plan:     Dyspnea on exertion: He continues to have this symptom which I would expect as he does have high pulmonary pressures and reactive lung disease. He will continue with his inhalers. He probably had some bronchitis which causes worsening shortness of breath.    Systolic heart failure: He does have some fluid overload signs mostly in the lower extremities in the form of edema. No JVD today. Go ahead and start Lasix.    Hypertension: Blood pressure is good in the office today. He will continue on his current regimen.    Edema: He has significant lower extremity edema which is probably multifactorial. He probably has some dependent edema as well as dietary indiscretion regarding sodium. I've instructed him to go ahead and take the Lasix 20 mg for 3 days to reduce his edema and then take it as needed. I have encouraged him to reduce sodium in his diet.    Coronary artery disease: No anginal symptoms. Nuclear stress test did not show any significant ischemia.    Dyslipidemia: Lipids are excellent except low HDL. He is encouraged to exercise regularly.    He will follow-up as scheduled with Dr. Brumfield on May 17, 2017. Sooner if problems.    Collaborating Provider: Dr. Dixon.

## 2017-03-29 NOTE — Clinical Note
"     [facility]   [address]  [place] [address]  Phone: [phone]  Fax: [phone]              Abbe Whitman  1940    Encounter Date: 3/29/2017    CELI Stiles          PROGRESS NOTE:  Subjective:   Abbe \"Ed\" Remington Whitman is a 76 y.o. male who presents today for hospital follow-up. He developed worsening shortness of breath and became concerned therefore he went to the emergency room at [facility]. He had some hypoxia therefore he was admitted. Pulmonary embolism was ruled out. It was felt that he had bronchitis, asthma and some minor heart failure. His ejection fraction was slightly less per echo at 40-45% he was prescribed Lasix 20 mg but did not started until he was instructed by our office.    He continues to have shortness of breath with exertion which he relates to his asthma. His breathing has improved. He was given anti-biotics and Ventolin. He continues to have lower extremity edema and admits to eating salty foods. Currently, he is sleeping in a recliner with the head elevated due to some respiratory secretions. He states he is able to lie flat.    He denies any palpitations or anginal symptoms.    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   • CAD (coronary artery disease) 8/31/2011   • Dyslipidemia 8/31/2011   • Ramsay esophagus    • GERD (gastroesophageal reflux disease)    • Dyspnea on exertion 2/19/2013   • Pleural effusion 7/1/2013   • Hypertension    • HTN (hypertension) 8/31/2011   • HTN (hypertension) 2013     pt states well controlled   • Myocardial infarct (CMS-[facility]) 2000   • Arrhythmia      a-fib occas   • Heart burn    • Other specified disorder of intestines 2013     constipation needs miralax daily   • CATARACT 2013   • Cold 10/20     12 hrs of diarrhea    • Indigestion    • Unspecified urinary incontinence    • " Atypical chest pain 3/16/2015     Past Surgical History   Procedure Laterality Date   • Cataract extraction       both eyes   • Other       right knee orthoscopic    • Multiple coronary artery bypass endo vein harvest  10/13/2009     Performed by JULIA ALCANTARA at SURGERY [facility]   • Maze procedure  10/13/2009     Performed by JULIA ALCANTARA at SURGERY [facility]   • Biopsy general  10/13/2009     Performed by JULIA ALCANTARA at SURGERY [facility]   • Angiogram  2009   • Inguinal hernia repair  9/15/2010     Performed by GRIS GARCÍA at SURGERY [facility]   • Thoracoscopy  10/29/2013     Performed by John H Ganser, M.D. at SURGERY [facility]   • Other cardiac surgery       Family History   Problem Relation Age of Onset   • Other Mother      afib   • Heart Attack Maternal Uncle      History   Smoking status   • Never Smoker    Smokeless tobacco   • Never Used     Allergies   Allergen Reactions   • Prednisone      Swelling in throat   • Ciprofloxacin      Per patient makes skin peel    • Dairy Aid [Lactase]      LACTOSE INTOLERANT   • Gluten Meal      BLOATED AND SICK   • Latex      Son says slight skin reaction     Outpatient Encounter Prescriptions as of 3/29/2017   Medication Sig Dispense Refill   • furosemide (LASIX) 20 MG Tab Take 1 Tab by mouth every day.     • bisoprolol (ZEBETA) 5 MG Tab Take 1 Tab by mouth every day. 30 Tab 11   • benazepril (LOTENSIN) 20 MG Tab Take 1 Tab by mouth every evening. 90 Tab 3   • rosuvastatin (CRESTOR) 40 MG tablet Take 1 Tab by mouth every day. 90 Tab 3   • nitroglycerin (NITROSTAT) 0.4 MG SL Tab Place 1 Tab under tongue as needed for Chest Pain. 25 Tab 5   • Albuterol Sulfate 108 (90 BASE) MCG/ACT AEROSOL POWDER, BREATH ACTIVATED Inhale 90 mcg by mouth every four hours as needed (sob or wheezing). 1 Each 5   • Mometasone Furo-Formoterol Fum (DULERA) 100-5 MCG/ACT Aerosol Inhale 2 Puffs by mouth 2 Times a Day. Use spacer. Rinse mouth after use. 1  "Inhaler 0   • Tiotropium Bromide Monohydrate (SPIRIVA RESPIMAT) 1.25 MCG/ACT Aero Soln Inhale 2 Inhalation by mouth every day. 1 Inhaler 5   • aspirin (ASA) 81 MG Chew Tab chewable tablet Take 1 Tab by mouth every day. 90 Tab 3   • lansoprazole (PREVACID) 30 MG CPDR Take 30 mg by mouth every day.     • tamsulosin (FLOMAX) 0.4 MG capsule Take 0.4 mg by mouth 2 Times a Day.       Facility-Administered Encounter Medications as of 3/29/2017   Medication Dose Route Frequency Provider Last Rate Last Dose   • albuterol (VENTOLIN or PROVENTIL) inhaler 2 Puff  2 Puff Inhalation Q4HRS PRN Julianne Almaraz, A.P.R.N.         Review of Systems   Constitutional: Negative for malaise/fatigue.   Respiratory: Positive for shortness of breath (exertion). Negative for cough.    Cardiovascular: Positive for orthopnea (sleeping in recliner now since bronchitis.) and leg swelling. Negative for chest pain, palpitations, claudication and PND.   Gastrointestinal: Negative for abdominal pain.   Musculoskeletal: Negative for myalgias.   Neurological: Negative for dizziness and weakness.        Objective:   /80 mmHg  Pulse 76  Ht 1.905 m (6' 3\")  Wt 100.699 kg (222 lb)  BMI 27.75 kg/m2  SpO2 92%    Physical Exam   Constitutional: He is oriented to person, place, and time. He appears well-developed and well-nourished.   HENT:   Head: Normocephalic.   Eyes: Conjunctivae are normal.   Neck: No JVD present. No thyromegaly present.   Cardiovascular: Normal rate, regular rhythm and normal heart sounds.    Pulmonary/Chest: Effort normal and breath sounds normal. He has no wheezes. He has no rales.   Abdominal: Soft. Bowel sounds are normal. He exhibits no distension. There is no tenderness.   Musculoskeletal: He exhibits edema (2+ pitting pretibial edema bilaterally.).   Neurological: He is alert and oriented to person, place, and time.   Skin: Skin is warm and dry.   Psychiatric: He has a normal mood and affect.     March 26, 2017: " Echocardiogram showed ejection fraction 40-45% with global hypokinesis. Grade 1 diastolic dysfunction. Mild to moderate mitral regurgitation. Moderate tricuspid regurgitation with RVSP 54 mmHg.    March 26, 2017: Nuclear stress test showed ejection fraction 45% with no significant ischemia.    March 25, 2017: Lipids: Cholesterol 85, triglycerides 44, HDL 26, LDL 51.      Assessment:     1. Dyspnea on exertion     2. Mild intermittent asthma without complication     3. Chronic systolic congestive heart failure (CMS-HCC)      March 26, 2017: Echocardiogram with ejection fraction 40-45%.   4. HTN (hypertension), benign     5. Localized edema     6. Coronary artery disease due to calcified coronary lesion: CABG ×5 in 2009     7. Dyslipidemia         Medical Decision Making:  Today's Assessment / Status / Plan:     Dyspnea on exertion: He continues to have this symptom which I would expect as he does have high pulmonary pressures and reactive lung disease. He will continue with his inhalers. He probably had some bronchitis which causes worsening shortness of breath.    Systolic heart failure: He does have some fluid overload signs mostly in the lower extremities in the form of edema. No JVD today. Go ahead and start Lasix.    Hypertension: Blood pressure is good in the office today. He will continue on his current regimen.    Edema: He has significant lower extremity edema which is probably multifactorial. He probably has some dependent edema as well as dietary indiscretion regarding sodium. I've instructed him to go ahead and take the Lasix 20 mg for 3 days to reduce his edema and then take it as needed. I have encouraged him to reduce sodium in his diet.    Coronary artery disease: No anginal symptoms. Nuclear stress test did not show any significant ischemia.    Dyslipidemia: Lipids are excellent except low HDL. He is encouraged to exercise regularly.    He will follow-up as scheduled with Dr. Brumfield on May 17, 2017.  Sooner if problems.    Collaborating Provider: Dr. Dixon.        No Recipients

## 2017-03-29 NOTE — MR AVS SNAPSHOT
"        Abbe Whitman   3/29/2017 1:40 PM   Office Visit   MRN: [id number]    Department:  [facility]   Dept Phone:  [phone]    Description:  Male : 1940   Provider:  CELI Stiles           Reason for Visit     Follow-Up shortness of breath      Allergies as of 3/29/2017     Allergen Noted Reactions    Prednisone 2012       Swelling in throat    Ciprofloxacin 2016       Per patient makes skin peel     Dairy Aid [Lactase] 2017       LACTOSE INTOLERANT    Gluten Meal 2017       BLOATED AND SICK    Latex 10/29/2013       Son says slight skin reaction      You were diagnosed with     Dyspnea on exertion   [449641]       Mild intermittent asthma without complication   [686937]       HTN (hypertension), benign   [196456]       Coronary artery disease due to calcified coronary lesion   [7041133]       Dyslipidemia   [529225]         Vital Signs     Blood Pressure Pulse Height Weight Body Mass Index Oxygen Saturation    130/80 mmHg 76 1.905 m (6' 3\") 100.699 kg (222 lb) 27.75 kg/m2 92%    Smoking Status                   Never Smoker            Basic Information     Date Of Birth Sex Race Ethnicity Preferred Language    1940 Male White Non- English      Your appointments     May 17, 2017  8:15 AM   FOLLOW UP with Armin Dixon M.D.   [facility] (--)    [address], [address]  [facility] [id number] [phone]              Problem List              ICD-10-CM Priority Class Noted - Resolved    Coronary artery disease due to calcified coronary lesion: CABG ×5 in  I25.10, I25.84   2011 - Present    HTN (hypertension), benign I10   2011 - Present    Dyslipidemia E78.5   2011 - Present    Dyspnea on exertion R06.09   2013 - Present    GERD (gastroesophageal reflux disease) (Chronic) K21.9   Unknown - Present    Arrhythmia (Chronic) I49.9   Unknown - Present    Myocardial infarct (Chronic) " I21.3   Unknown - Present    Pleural effusion J90   10/29/2013 - Present    Chest pain R07.9   3/14/2015 - Present    Hypokalemia E87.6   3/16/2015 - Present    Atypical chest pain R07.89   3/16/2015 - Present    Mild intermittent asthma without complication J45.20   4/19/2016 - Present      Health Maintenance        Date Due Completion Dates    IMM DTaP/Tdap/Td Vaccine (1 - Tdap) 6/26/1961 ---    COLONOSCOPY 6/26/1992 ---    IMM ZOSTER VACCINE 6/26/2002 ---    IMM PNEUMOCOCCAL 65+ (ADULT) LOW/MEDIUM RISK SERIES (1 of 2 - PCV13) 6/26/2007 ---    IMM INFLUENZA (1) 9/1/2016 ---            Current Immunizations     No immunizations on file.      Below and/or attached are the medications your provider expects you to take. Review all of your home medications and newly ordered medications with your provider and/or pharmacist. Follow medication instructions as directed by your provider and/or pharmacist. Please keep your medication list with you and share with your provider. Update the information when medications are discontinued, doses are changed, or new medications (including over-the-counter products) are added; and carry medication information at all times in the event of emergency situations     Allergies:  PREDNISONE - (reactions not documented)     CIPROFLOXACIN - (reactions not documented)     DAIRY AID - (reactions not documented)     GLUTEN MEAL - (reactions not documented)     LATEX - (reactions not documented)               Medications  Valid as of: March 29, 2017 -  2:18 PM    Generic Name Brand Name Tablet Size Instructions for use    Albuterol Sulfate (AEROSOL POWDER, BREATH ACTIVATED) Albuterol Sulfate 108 (90 BASE) MCG/ACT Inhale 90 mcg by mouth every four hours as needed (sob or wheezing).        Aspirin (Chew Tab) ASA 81 MG Take 1 Tab by mouth every day.        Benazepril HCl (Tab) LOTENSIN 20 MG Take 1 Tab by mouth every evening.        Bisoprolol Fumarate (Tab) ZEBETA 5 MG Take 1 Tab by mouth every  day.        Furosemide (Tab) LASIX 20 MG Take 1 Tab by mouth every day.        Lansoprazole (CAPSULE DELAYED RELEASE) PREVACID 30 MG Take 30 mg by mouth every day.        Mometasone Furo-Formoterol Fum (Aerosol) Mometasone Furo-Formoterol Fum 100-5 MCG/ACT Inhale 2 Puffs by mouth 2 Times a Day. Use spacer. Rinse mouth after use.        Nitroglycerin (SL Tab) NITROSTAT 0.4 MG Place 1 Tab under tongue as needed for Chest Pain.        Rosuvastatin Calcium (Tab) CRESTOR 40 MG Take 1 Tab by mouth every day.        Tamsulosin HCl (Cap) FLOMAX 0.4 MG Take 0.4 mg by mouth 2 Times a Day.        Tiotropium Bromide Monohydrate (Aero Soln) Tiotropium Bromide Monohydrate 1.25 MCG/ACT Inhale 2 Inhalation by mouth every day.        .                 Medicines prescribed today were sent to:     [facility] [address] [address]    Phone: [phone] Fax: [phone]    Open 24 Hours?: No      Medication refill instructions:       If your prescription bottle indicates you have medication refills left, it is not necessary to call your provider’s office. Please contact your pharmacy and they will refill your medication.    If your prescription bottle indicates you do not have any refills left, you may request refills at any time through one of the following ways: The online [employer] system (except Urgent Care), by calling your provider’s office, or by asking your pharmacy to contact your provider’s office with a refill request. Medication refills are processed only during regular business hours and may not be available until the next business day. Your provider may request additional information or to have a follow-up visit with you prior to refilling your medication.   *Please Note: Medication refills are assigned a new Rx number when refilled electronically. Your pharmacy may indicate that no refills were authorized even though a new prescription for the same medication  is available at the pharmacy. Please request the medicine by name with the pharmacy before contacting your provider for a refill.           MyChart Status: Patient Declined

## 2017-04-13 ENCOUNTER — TELEPHONE (OUTPATIENT)
Dept: CARDIOLOGY | Facility: MEDICAL CENTER | Age: 77
End: 2017-04-13

## 2017-04-13 DIAGNOSIS — J45.909 UNCOMPLICATED ASTHMA, UNSPECIFIED ASTHMA SEVERITY: ICD-10-CM

## 2017-04-13 DIAGNOSIS — Z99.81 DEPENDENCE ON NOCTURNAL OXYGEN THERAPY: ICD-10-CM

## 2017-04-13 DIAGNOSIS — R09.02 HYPOXIA: ICD-10-CM

## 2017-04-13 NOTE — TELEPHONE ENCOUNTER
----- Message from Armin Dixon M.D. sent at 4/13/2017 10:14 AM PDT -----  Please call pt and see how he is doing.  ----- Message -----     From: Carlyle Ortiz M.D.     Sent: 4/10/2017   8:07 AM       To: Armin Dixon M.D.    Paged me last night on call complaining of increasing chest pain/GERD with radiation into the neck.  He was concerned this is similar to his MI he had before but is self resolving.  I told him to take nitro and I would let you know today.  CHRIS

## 2017-04-13 NOTE — TELEPHONE ENCOUNTER
Call patient  Received: Today       Armin Dixon M.D.  Lisbeth Manjarrez R.N.                     Please call pt and see how he is doing.       Called patient. He states that on Sunday night, he had chest pain that radiated into the left side of his neck. The pain only lasted a few minutes then resolved spontaneously without taking any nitroglycerin. He has not had any episodes since. He is using a Breo Ellipta inhaler, he read in the medication information packet that it can cause sore throat. He thinks that his symptoms over the weekend are related to the inhaler.    Patient also mentions that he uses nocturnal home oxygen, but during the day his oxygen is sometimes 82-84%. He states that he has seen AMAYA DOMINGUEZ with [facility], but has not had an appointment in some time. Advised patient to contact [facility] for an appointment. Patient verbalized understanding and agrees with plan.    Forwarded to Dr. Dixon for fyi.    RICH VILLA

## 2017-04-21 ENCOUNTER — OFFICE VISIT (OUTPATIENT)
Dept: PULMONOLOGY | Facility: HOSPICE | Age: 77
End: 2017-04-21
Payer: MEDICARE

## 2017-04-21 VITALS
OXYGEN SATURATION: 80 % | BODY MASS INDEX: 28.86 KG/M2 | RESPIRATION RATE: 16 BRPM | HEART RATE: 77 BPM | TEMPERATURE: 98.4 F | DIASTOLIC BLOOD PRESSURE: 70 MMHG | WEIGHT: 237 LBS | SYSTOLIC BLOOD PRESSURE: 118 MMHG | HEIGHT: 76 IN

## 2017-04-21 DIAGNOSIS — G47.33 OSA (OBSTRUCTIVE SLEEP APNEA): ICD-10-CM

## 2017-04-21 DIAGNOSIS — J43.9 PULMONARY EMPHYSEMA, UNSPECIFIED EMPHYSEMA TYPE (HCC): ICD-10-CM

## 2017-04-21 DIAGNOSIS — J45.20 MILD INTERMITTENT ASTHMA WITHOUT COMPLICATION: ICD-10-CM

## 2017-04-21 PROCEDURE — G8598 ASA/ANTIPLAT THER USED: HCPCS | Performed by: NURSE PRACTITIONER

## 2017-04-21 PROCEDURE — 1101F PT FALLS ASSESS-DOCD LE1/YR: CPT | Mod: 8P | Performed by: NURSE PRACTITIONER

## 2017-04-21 PROCEDURE — 99214 OFFICE O/P EST MOD 30 MIN: CPT | Performed by: NURSE PRACTITIONER

## 2017-04-21 PROCEDURE — 4040F PNEUMOC VAC/ADMIN/RCVD: CPT | Mod: 8P | Performed by: NURSE PRACTITIONER

## 2017-04-21 PROCEDURE — 1036F TOBACCO NON-USER: CPT | Performed by: NURSE PRACTITIONER

## 2017-04-21 PROCEDURE — G8432 DEP SCR NOT DOC, RNG: HCPCS | Performed by: NURSE PRACTITIONER

## 2017-04-21 PROCEDURE — G8419 CALC BMI OUT NRM PARAM NOF/U: HCPCS | Performed by: NURSE PRACTITIONER

## 2017-04-21 RX ORDER — FLUCONAZOLE 100 MG/1
100 TABLET ORAL DAILY
Qty: 5 TAB | Refills: 0 | Status: SHIPPED | OUTPATIENT
Start: 2017-04-21 | End: 2018-01-01

## 2017-04-21 RX ORDER — PREDNISONE 20 MG/1
20 TABLET ORAL DAILY
COMMUNITY
End: 2017-01-01

## 2017-04-21 RX ORDER — ZOLPIDEM TARTRATE 5 MG/1
5 TABLET ORAL NIGHTLY PRN
Qty: 3 TAB | Refills: 0 | Status: SHIPPED | OUTPATIENT
Start: 2017-04-21 | End: 2017-01-01

## 2017-04-21 RX ORDER — OMEPRAZOLE 20 MG/1
20 CAPSULE, DELAYED RELEASE ORAL DAILY
COMMUNITY
End: 2018-01-01

## 2017-04-21 NOTE — PATIENT INSTRUCTIONS
1) Continue Breo 100-5 and rescue inhaler   2) Continue nocturnal 2.5L of oxygen  3) Encouraged daily exercise  4) Vaccines: Up to date with Prevnar and Pneumococcal  5) Start daytime oxygen at 3L   6) Diflucan for probable esophageal thrush  7) Sleep study ordered. Ambien Rx provided  8) Return in about 2 months (around 6/21/2017) for sleep study results, review of symptoms, if not sooner, follow up with ALBERTO Valencia, pulmonary function results.

## 2017-04-21 NOTE — PROGRESS NOTES
"CC:  Here for f/u pulmonary issues as listed below    HPI:   Abbe presents today for follow up for Asthma. PFTs from 12/2015 indicate a Fev1 of 1.96L or 49% predicted with a no bronchodilator response, Fev1/FVC ratio of 95, TLC 52%, DLCO 54%.  Reviewed PFT tests with Dr. Cevallos without further recommendations at this time. CAT scan from 12/2015 showed trace loculated pleural effusion and bilateral subpleural bands of sub-segmental atelectasis and/or scarring, which could be related to his restrictive changes found on PFTs per Dr. Cevallos.   Patient was trialed on Anoro at last visit with benefit, but cost too much. He has Breo 100-5 1 puff, once a day. He c/o SE of sore throat.He rarely uses his pro-air. Appx 1 month ago he had hx of bronchitis. He continues to have nasal congestion with yellow mucus and cough with white mucus. Patient believes his dyspnea is stable.  He denies wheezing, hemoptysis, chest pain chest tightness, fevers or chills, acid reflux, morning headaches.  He is compliant using 2-1/2 L of oxygen at night. CNOX on 2.5L is adequate with time below 88% of 12.5 minutes with basal of 92.9%.  Patient arrived on room air and a low of 81% at rest. A Multi-ox was completed today after he was placed on 2 L of oxygen while exerting and was at a low of 84%. He was increased at 3 L and maintained at 91%. He will benefit from the addition of oxygen.     Patient is currently sleeping 8-9 hours per night with 2-3x nighttime awakenings.  He sleeps every 3 hours max until he urinates. They have no trouble falling asleep.   They typically feel refreshed in the morning, but after he takes off his oxygen he feels \"out of it\".  He then checks his oxygen which has been in the mid 70s. He occasionally morning H/A. They feel tired throughout the day and naps daily for appx 30 min long.  Patient reports snoring and denies apnea events and paroxysmal nocturnal dyspnea events. They have never fallen asleep in conversation, at " the wheel, or at work.  They deny sleepwalking/talking.       Patient Active Problem List    Diagnosis Date Noted   • Pulmonary emphysema (CMS-HCC) 04/24/2017   • BRITTANY (obstructive sleep apnea) 04/24/2017   • Mild intermittent asthma without complication 04/19/2016   • Hypokalemia 03/16/2015   • Atypical chest pain 03/16/2015   • Chest pain 03/14/2015   • Pleural effusion 10/29/2013   • GERD (gastroesophageal reflux disease)    • Arrhythmia    • Myocardial infarct    • Dyspnea on exertion 02/19/2013   • Coronary artery disease due to calcified coronary lesion: CABG ×5 in 2009 08/31/2011   • HTN (hypertension), benign 08/31/2011   • Dyslipidemia 08/31/2011       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   • CAD (coronary artery disease) 8/31/2011   • Dyslipidemia 8/31/2011   • Ramsay esophagus    • GERD (gastroesophageal reflux disease)    • Dyspnea on exertion 2/19/2013   • Pleural effusion 7/1/2013   • Hypertension    • HTN (hypertension) 8/31/2011   • HTN (hypertension) 2013     pt states well controlled   • Myocardial infarct (CMS-HCC) 2000   • Arrhythmia      a-fib occas   • Heart burn    • Other specified disorder of intestines 2013     constipation needs miralax daily   • CATARACT 2013   • Cold 10/20     12 hrs of diarrhea    • Indigestion    • Unspecified urinary incontinence    • Atypical chest pain 3/16/2015       Past Surgical History   Procedure Laterality Date   • Cataract extraction       both eyes   • Other       right knee orthoscopic    • Multiple coronary artery bypass endo vein harvest  10/13/2009     Performed by JULIA ALCATNARA at SURGERY [facility] ORS   • Maze procedure  10/13/2009     Performed by JULIA ALCANTARA at SURGERY [facility] ORS   • Biopsy general  10/13/2009     Performed by JULIA ALCANTARA at SURGERY [facility] ORS   • Angiogram  2009   • Inguinal hernia repair  9/15/2010     Performed by GRIS GARCÍA at  SURGERY [facility] ORS   • Thoracoscopy  10/29/2013     Performed by John H Ganser, M.D. at SURGERY [facility] ORS   • Other cardiac surgery         Family History   Problem Relation Age of Onset   • Other Mother      afib   • Heart Attack Maternal Uncle        Social History   Substance Use Topics   • Smoking status: Never Smoker    • Smokeless tobacco: Never Used   • Alcohol Use: No       Current Outpatient Prescriptions   Medication Sig Dispense Refill   • predniSONE (DELTASONE) 20 MG Tab Take 20 mg by mouth every day.     • omeprazole (PRILOSEC) 20 MG delayed-release capsule Take 20 mg by mouth every day.     • fluconazole (DIFLUCAN) 100 MG Tab Take 1 Tab by mouth every day. 5 Tab 0   • zolpidem (AMBIEN) 5 MG Tab Take 1 Tab by mouth at bedtime as needed for Sleep (1 to 3 po qhs prn insomnia/sleep study. Bring to sleep study.). 3 Tab 0   • bisoprolol (ZEBETA) 5 MG Tab Take 1 Tab by mouth every day. 30 Tab 11   • benazepril (LOTENSIN) 20 MG Tab Take 1 Tab by mouth every evening. 90 Tab 3   • rosuvastatin (CRESTOR) 40 MG tablet Take 1 Tab by mouth every day. 90 Tab 3   • Albuterol Sulfate 108 (90 BASE) MCG/ACT AEROSOL POWDER, BREATH ACTIVATED Inhale 90 mcg by mouth every four hours as needed (sob or wheezing). 1 Each 5   • aspirin (ASA) 81 MG Chew Tab chewable tablet Take 1 Tab by mouth every day. 90 Tab 3   • tamsulosin (FLOMAX) 0.4 MG capsule Take 0.4 mg by mouth 2 Times a Day.     • furosemide (LASIX) 20 MG Tab Take 1 Tab by mouth every day.     • nitroglycerin (NITROSTAT) 0.4 MG SL Tab Place 1 Tab under tongue as needed for Chest Pain. 25 Tab 5   • Mometasone Furo-Formoterol Fum (DULERA) 100-5 MCG/ACT Aerosol Inhale 2 Puffs by mouth 2 Times a Day. Use spacer. Rinse mouth after use. 1 Inhaler 0   • Tiotropium Bromide Monohydrate (SPIRIVA RESPIMAT) 1.25 MCG/ACT Aero Soln Inhale 2 Inhalation by mouth every day. 1 Inhaler 5   • lansoprazole (PREVACID) 30 MG CPDR Take 30 mg by mouth every day.       Current  "Facility-Administered Medications   Medication Dose Route Frequency Provider Last Rate Last Dose   • albuterol (VENTOLIN or PROVENTIL) inhaler 2 Puff  2 Puff Inhalation Q4HRS PRN ETHEL Amos.P.RANTHONY              Allergies: Prednisone; Ciprofloxacin; Dairy aid; Gluten meal; and Latex          ROS   Gen: Denies fever, chills, unintentional weight loss, fatigue, night sweats  E/N/T: Denies nasal congestion, ear pain  Resp:Denies Dyspnea, wheezing, cough, sputum production, hemoptysis  CV: Denies chest pain, chest tightness, palpitations  Sleep:Denies morning headache  Neuro: Denies frequent headaches, weakness, dizziness  GI: Denies acid reflux, N/V  See HPI.  All other systems reviewed and negative          Vital signs for this encounter:  Filed Vitals:    04/21/17 1338   Height: 1.93 m (6' 3.98\")   Weight: 107.502 kg (237 lb)   Weight % change since last entry.: 0 %   BP: 118/70   Pulse: 77   BMI (Calculated): 28.86   Resp: 16   Temp: 36.9 °C (98.4 °F)   O2 sat % room air: 80 %       Multi-Ox Readings  Multi Ox #1     O2 sat % at rest     O2 sat % on exertion     O2 sat average on exertion     Multi Ox #2     O2 sat % at rest     O2 sat % on exertion     O2 sat average on exertion       Oxygen Use 2.5   Oxygen Frequency Nocturnal   Duration of need     Is the patient mobile within the home?     CPAP Use?     BIPAP Use?     Servo Titration                   Physical Exam:   Appearance: well developed, well nourished, no acute distress.   Eyes: PERRL, EOM intact, sclere white, conjunctiva moist.  Ears: no lesions or deformities.  Hearing: grossly intact.  Nose: no lesions or deformities.  Dentition: good dentition.   Oropharynx: tongue normal, posterior pharynx without erythema or exudate.  Neck: supple, trachea midline, no masses.  Respiratory effort: no intercostal retractions or use of accessory muscles.  Lung auscultation: Bilateral diminished   Heart auscultation: no murmur, rub, or gallop   Extremities: " no cyanosis or edema.  Abdomen: soft, non-tender, no masses.  Gait and station: without difficulty   Digits and Nails: no clubbing, cyanosis, petechiae, or nodes.  Cranial nerves: grossly normal.  Motor: no focal deficits observed.   Skin: no rashes, lesions, or ulcers noted.  Orientation: oriented to time, place, and person.  Mood and affect: mood and affect appropriate, normal interaction with examiner.      Assessment   1. Mild intermittent asthma without complication  DME O2 NEW SET UP    AMB MULTIPLE OXIMETRY    fluconazole (DIFLUCAN) 100 MG Tab    POLYSOMNOGRAPHY, 4 OR MORE    zolpidem (AMBIEN) 5 MG Tab    AMB PULMONARY FUNCTION TEST/LAB   2. Pulmonary emphysema, unspecified emphysema type (CMS-HCC)  DME O2 NEW SET UP    AMB MULTIPLE OXIMETRY    fluconazole (DIFLUCAN) 100 MG Tab    POLYSOMNOGRAPHY, 4 OR MORE    zolpidem (AMBIEN) 5 MG Tab    AMB PULMONARY FUNCTION TEST/LAB   3. BRITTANY (obstructive sleep apnea)  AMB PULMONARY FUNCTION TEST/LAB       PLAN:   Patient Instructions   1) Continue Breo 100-5 and rescue inhaler   2) Continue nocturnal 2.5L of oxygen  3) Encouraged daily exercise  4) Vaccines: Up to date with Prevnar and Pneumococcal  5) Start daytime oxygen at 3L   6) Diflucan for probable esophageal thrush  7) Sleep study ordered. Ambien Rx provided  8) Return in about 2 months (around 6/21/2017) for sleep study results, PFT results, review of symptoms, if not sooner, follow up with ALBERTO Valencia.

## 2017-04-21 NOTE — MR AVS SNAPSHOT
"        Abbe Bauerd   2017 1:40 PM   Office Visit   MRN: [id number]    Department:  [facility]   Dept Phone:  [phone]    Description:  Male : 1940   Provider:  KALE Amos           Reason for Visit     Asthma Last seen 16      Allergies as of 2017     Allergen Noted Reactions    Prednisone 2012       Swelling in throat    Ciprofloxacin 2016       Per patient makes skin peel     Dairy Aid [Lactase] 2017       LACTOSE INTOLERANT    Gluten Meal 2017       BLOATED AND SICK    Latex 10/29/2013       Son says slight skin reaction      You were diagnosed with     Mild intermittent asthma without complication   [669877]       Pulmonary emphysema, unspecified emphysema type (CMS-[facility])   [5346405]         Vital Signs     Blood Pressure Pulse Temperature Respirations Height Weight    118/70 mmHg 77 36.9 °C (98.4 °F) 16 1.93 m (6' 3.98\") 107.502 kg (237 lb)    Body Mass Index Oxygen Saturation Smoking Status             28.86 kg/m2 80% Never Smoker          Basic Information     Date Of Birth Sex Race Ethnicity Preferred Language    1940 Male White Non- English      Your appointments     May 17, 2017  8:15 AM   FOLLOW UP with Armin Dixon M.D.   [facility] (--)    [address], [address]  [facility] [id number] [phone]              Problem List              ICD-10-CM Priority Class Noted - Resolved    Coronary artery disease due to calcified coronary lesion: CABG ×5 in  I25.10, I25.84   2011 - Present    HTN (hypertension), benign I10   2011 - Present    Dyslipidemia E78.5   2011 - Present    Dyspnea on exertion R06.09   2013 - Present    GERD (gastroesophageal reflux disease) (Chronic) K21.9   Unknown - Present    Arrhythmia (Chronic) I49.9   Unknown - Present    Myocardial infarct (Chronic) I21.3   Unknown - Present    Pleural effusion J90   10/29/2013 - " Present    Chest pain R07.9   3/14/2015 - Present    Hypokalemia E87.6   3/16/2015 - Present    Atypical chest pain R07.89   3/16/2015 - Present    Mild intermittent asthma without complication J45.20   4/19/2016 - Present      Health Maintenance        Date Due Completion Dates    IMM DTaP/Tdap/Td Vaccine (1 - Tdap) 6/26/1961 ---    COLONOSCOPY 6/26/1992 ---    IMM ZOSTER VACCINE 6/26/2002 ---    IMM PNEUMOCOCCAL 65+ (ADULT) LOW/MEDIUM RISK SERIES (1 of 2 - PCV13) 6/26/2007 ---            Current Immunizations     No immunizations on file.      Below and/or attached are the medications your provider expects you to take. Review all of your home medications and newly ordered medications with your provider and/or pharmacist. Follow medication instructions as directed by your provider and/or pharmacist. Please keep your medication list with you and share with your provider. Update the information when medications are discontinued, doses are changed, or new medications (including over-the-counter products) are added; and carry medication information at all times in the event of emergency situations     Allergies:  PREDNISONE - (reactions not documented)     CIPROFLOXACIN - (reactions not documented)     DAIRY AID - (reactions not documented)     GLUTEN MEAL - (reactions not documented)     LATEX - (reactions not documented)               Medications  Valid as of: April 21, 2017 -  2:59 PM    Generic Name Brand Name Tablet Size Instructions for use    Albuterol Sulfate (AEROSOL POWDER, BREATH ACTIVATED) Albuterol Sulfate 108 (90 BASE) MCG/ACT Inhale 90 mcg by mouth every four hours as needed (sob or wheezing).        Aspirin (Chew Tab) ASA 81 MG Take 1 Tab by mouth every day.        Benazepril HCl (Tab) LOTENSIN 20 MG Take 1 Tab by mouth every evening.        Bisoprolol Fumarate (Tab) ZEBETA 5 MG Take 1 Tab by mouth every day.        Fluconazole (Tab) DIFLUCAN 100 MG Take 1 Tab by mouth every day.        Furosemide (Tab)  LASIX 20 MG Take 1 Tab by mouth every day.        Lansoprazole (CAPSULE DELAYED RELEASE) PREVACID 30 MG Take 30 mg by mouth every day.        Mometasone Furo-Formoterol Fum (Aerosol) Mometasone Furo-Formoterol Fum 100-5 MCG/ACT Inhale 2 Puffs by mouth 2 Times a Day. Use spacer. Rinse mouth after use.        Nitroglycerin (SL Tab) NITROSTAT 0.4 MG Place 1 Tab under tongue as needed for Chest Pain.        Omeprazole (CAPSULE DELAYED RELEASE) PRILOSEC 20 MG Take 20 mg by mouth every day.        PredniSONE (Tab) DELTASONE 20 MG Take 20 mg by mouth every day.        Rosuvastatin Calcium (Tab) CRESTOR 40 MG Take 1 Tab by mouth every day.        Tamsulosin HCl (Cap) FLOMAX 0.4 MG Take 0.4 mg by mouth 2 Times a Day.        Tiotropium Bromide Monohydrate (Aero Soln) Tiotropium Bromide Monohydrate 1.25 MCG/ACT Inhale 2 Inhalation by mouth every day.        Zolpidem Tartrate (Tab) AMBIEN 5 MG Take 1 Tab by mouth at bedtime as needed for Sleep (1 to 3 po qhs prn insomnia/sleep study. Bring to sleep study.).        .                 Medicines prescribed today were sent to:     PHARMACIST AT [facility]. [address] [address]    Phone: [phone] Fax: [phone]    Open 24 Hours?: No      Medication refill instructions:       If your prescription bottle indicates you have medication refills left, it is not necessary to call your provider’s office. Please contact your pharmacy and they will refill your medication.    If your prescription bottle indicates you do not have any refills left, you may request refills at any time through one of the following ways: The online [employer] system (except Urgent Care), by calling your provider’s office, or by asking your pharmacy to contact your provider’s office with a refill request. Medication refills are processed only during regular business hours and may not be available until the next business day. Your provider may request additional  information or to have a follow-up visit with you prior to refilling your medication.   *Please Note: Medication refills are assigned a new Rx number when refilled electronically. Your pharmacy may indicate that no refills were authorized even though a new prescription for the same medication is available at the pharmacy. Please request the medicine by name with the pharmacy before contacting your provider for a refill.        Your To Do List     Future Labs/Procedures Complete By Expires    AMB MULTIPLE OXIMETRY  As directed 4/21/2018    POLYSOMNOGRAPHY, 4 OR MORE  As directed 4/21/2018      Instructions    1) Continue Breo 100-5 and rescue inhaler   2) Continue nocturnal 2.5L of oxygen  3) Encouraged daily exercise  4) Vaccines: Up to date with Prevnar and Pneumococcal  5) Start daytime oxygen at 3L   6) Diflucan for probable esophageal thrush  7) Sleep study ordered. Ambien Rx provided  8) Return in about 2 months (around 6/21/2017) for sleep study results, review of symptoms, if not sooner, follow up with ALBERTO Valencia.              [employer] Access Code: [email or web address]  Expires: 5/15/2017  1:17 PM    [employer]IV[employer]  A secure, online tool to manage your health information     [employer]’s [employer]® is a secure, online tool that connects you to your personalized health information from the privacy of your home -- day or night - making it very easy for you to manage your healthcare. Once the activation process is completed, you can even access your medical information using the [employer] lukas, which is available for free in the [employer] store or Google Play store.     [employer] provides the following levels of access (as shown below):   My Chart Features   [facility] Doctor [facility]  Urgent  Care [facility]  Primary Care  Doctor   Email your healthcare team securely and privately 24/7 X X X    Manage appointments: schedule your next appointment; view details of past/upcoming appointments  X      Request prescription refills. X      View recent personal medical records, including lab and immunizations X X X X   View health record, including health history, allergies, medications X X X X   Read reports about your outpatient visits, procedures, consult and ER notes X X X X   See your discharge summary, which is a recap of your hospital and/or ER visit that includes your diagnosis, lab results, and care plan. X X       How to register for [employer]:  1. Go to  [email or web address].  2. Click on the Sign Up Now box, which takes you to the New Member Sign Up page. You will need to provide the following information:  a. Enter your [employer] Access Code exactly as it appears at the top of this page. (You will not need to use this code after you’ve completed the sign-up process. If you do not sign up before the expiration date, you must request a new code.)   b. Enter your date of birth.   c. Enter your home email address.   d. Click Submit, and follow the next screen’s instructions.  3. Create a [employer] ID. This will be your [employer] login ID and cannot be changed, so think of one that is secure and easy to remember.  4. Create a [employer] password. You can change your password at any time.  5. Enter your Password Reset Question and Answer. This can be used at a later time if you forget your password.   6. Enter your e-mail address. This allows you to receive e-mail notifications when new information is available in [employer].  7. Click Sign Up. You can now view your health information.    For assistance activating your [employer] account, call [phone]

## 2017-04-24 PROBLEM — G47.33 OSA (OBSTRUCTIVE SLEEP APNEA): Status: ACTIVE | Noted: 2017-04-24

## 2017-04-24 PROBLEM — J43.9 PULMONARY EMPHYSEMA (HCC): Status: ACTIVE | Noted: 2017-04-24

## 2017-04-27 ENCOUNTER — SLEEP STUDY (OUTPATIENT)
Dept: SLEEP MEDICINE | Facility: MEDICAL CENTER | Age: 77
End: 2017-04-27
Attending: NURSE PRACTITIONER
Payer: MEDICARE

## 2017-04-27 DIAGNOSIS — J43.9 PULMONARY EMPHYSEMA, UNSPECIFIED EMPHYSEMA TYPE (HCC): ICD-10-CM

## 2017-04-27 DIAGNOSIS — J45.20 MILD INTERMITTENT ASTHMA WITHOUT COMPLICATION: ICD-10-CM

## 2017-04-27 PROCEDURE — 95810 POLYSOM 6/> YRS 4/> PARAM: CPT | Performed by: INTERNAL MEDICINE

## 2017-04-28 NOTE — PROCEDURES
Clinical Comments:  The patient underwent a comprehensive polysomnogram using the standard montage for measurement of parameters of sleep, respiratory events, movement abnormalities, heart rate and rhythm. A microphone was used to monitor snoring.      INTERPRETATION:  The total recording time was 436.6 minutes with a sleep period of 366.5 minutes and the total sleep time was 120.5 minutes with a sleep efficiency of 27.6%.  The sleep latency was 70.1 minutes, and REM latency was N/A minutes.  The patient experienced 127 arousals in total, for an arousal index of 63.2    RESPIRATORY: The patient had 8 apneas in total.  Of these, 2 were obstructive apneas, and 6 were central apneas.  This resulted in an apnea index (AI) of 4.0.  The patient had 60 hypopneas, for a hypopnea index of 29.9.  The overall AHI was 33.9, while the AHI during Stage R sleep was N/A.  AHI while supine was 3.4.    OXIMETRY: Oxygen saturation monitoring showed a mean SpO2 of 90.1%, with a minimum oxygen saturation of 76.0%.  Oxygen saturations were less than or = 89% for 31.2 minutes of sleep time.    CARDIAC: The highest heart rate during the recording was 123.0 beats per minute.  The average heart rate during sleep was 79.1 bpm.    LIMB MOVEMENTS: There were a total of 9 PLMs during sleep, of which 1 were PLMs arousals.  This resulted in a PLMS index of 4.5.    76-year-old male with a history of coronary disease, hypertension, dyslipidemia and pleural effusions with restrictive lung disease and symptoms of snoring and daytime hypersomnolence.    Technical summary:The patient underwent a diagnostic polysomnogram. This was a 16 channel montage study to include a 6 channel EEG, a 2 channel EOG, and chin EMG, left and right leg EMG, a snore channel, a nasal pressure transducer, and a nasal oral airflow semester.   Respiratory effort was assessed with the use of a thoracic and abdominal monitor and overnight oximetry was obtained. Audio and  video recordings were reviewed. This was a fully attended study and sleep stage scoring was performed. The test was technically adequate.     General sleep summary:  During the overnight study, there was a moderately prolonged sleep latency and no REM sleep REM latency.   The total sleep time was 120 minutes and sleep efficiency was 28%.  Sleep stage proportions showed 34% stage I, and 66% stage II. There was no delta or REM sleep.  In regards to sleep quality there was a severe degree of sleep fragmentation as shown by the arousal index of 63 an hour. The arousals were due to obstructive hypopneas and respiratory effort related arousals.    Respiratory summary: During the overnight study, the patient demonstrated severe obstructive sleep apnea as shown by the apnea hypopnea index of 34 an hour and a respiratory effort related arousal index of 23 an hour and a total respiratory disturbance index of 57 per hour. There was a total of 68 apneas and hypopneas and 46 respiratory effort related arousals. In the supine position the respiratory disturbance index was 48 an hour and in the non-supine position the respiratory disturbance index was 60 per hour. The respiratory events were associated with a low oxygen saturation of 72% from a baseline oxygen saturation 92% although the baseline oxygen saturation dropped down to 86-87% requiring supplemental oxygen up to 3 L nasal cannula.  The desaturation index was 11 an hour. There was loud snoring. The patient demonstrated a normal sinus rhythm with an average heartbeat of 81 bpm. The EKG showed normal sinus rhythm with PVCs but without tachyarrhythmias.    Periodic limb movement summary: The patient demonstrated an insignificant degree of periodic limb movements as shown by the PLM index of 0.5 per hour.    Impression:  1. Severe non-positional obstructive sleep apnea  2. Severe oxygen desaturations with a low oxygen saturation of 72% and there was a reduction in the  baseline oxygen saturation requiring supplemental oxygen up to 3 L nasal cannula to keep oxygen saturations above 90%  3. Loud snoring  4. Severe sleep fragmentation  5. Severely reduced sleep efficiency with prolonged episodes of wakefulness and alterations in sleep architecture with a predominance of stage I and 2 sleep  6. PVCs  7. Coronary disease  8. Hypertension  9. Restrictive lung disease    Recommendations:    Recommend the patient return for a CPAP titration. BiPAP therapy may be required as well as supplemental oxygen. In some cases alternative treatment options may prove effective in resolving sleep apnea and these options include upper airway surgery, the use of a dental orthotic or weight loss and positional therapy. Clinical correlation is required. In general patients with sleep apnea are advised to avoid alcohol and sedatives and to not operate a motor vehicle while drowsy. Also untreated sleep apnea is associated with an increased risk for cardiovascular disease.

## 2017-05-10 ENCOUNTER — HOSPITAL ENCOUNTER (OUTPATIENT)
Dept: RADIOLOGY | Facility: MEDICAL CENTER | Age: 77
End: 2017-05-10

## 2017-05-15 ENCOUNTER — TELEPHONE (OUTPATIENT)
Dept: PULMONOLOGY | Facility: HOSPICE | Age: 77
End: 2017-05-15

## 2017-05-15 NOTE — TELEPHONE ENCOUNTER
Per last OV 4/21/17 with ALBERTO Valencia return with Sleep Study and PFT. Sleep Study completed and scheduled to see ALBERTO Valencia on 5/17/17 for results. PFT not scheduled. I spoke to the patient, patient agreed to come in at 9:00 for PFT and then see ALBERTO Valencia for results

## 2017-05-17 ENCOUNTER — NON-PROVIDER VISIT (OUTPATIENT)
Dept: PULMONOLOGY | Facility: HOSPICE | Age: 77
End: 2017-05-17
Payer: MEDICARE

## 2017-05-17 ENCOUNTER — OFFICE VISIT (OUTPATIENT)
Dept: PULMONOLOGY | Facility: HOSPICE | Age: 77
End: 2017-05-17
Payer: MEDICARE

## 2017-05-17 ENCOUNTER — OFFICE VISIT (OUTPATIENT)
Dept: CARDIOLOGY | Facility: MEDICAL CENTER | Age: 77
End: 2017-05-17
Payer: COMMERCIAL

## 2017-05-17 VITALS
DIASTOLIC BLOOD PRESSURE: 62 MMHG | SYSTOLIC BLOOD PRESSURE: 102 MMHG | TEMPERATURE: 99.3 F | WEIGHT: 214 LBS | HEART RATE: 77 BPM | RESPIRATION RATE: 16 BRPM | BODY MASS INDEX: 26.61 KG/M2 | OXYGEN SATURATION: 97 % | HEIGHT: 75 IN

## 2017-05-17 VITALS
HEART RATE: 74 BPM | DIASTOLIC BLOOD PRESSURE: 60 MMHG | OXYGEN SATURATION: 96 % | SYSTOLIC BLOOD PRESSURE: 120 MMHG | WEIGHT: 214 LBS | BODY MASS INDEX: 26.61 KG/M2 | HEIGHT: 75 IN

## 2017-05-17 DIAGNOSIS — I50.22 CHRONIC SYSTOLIC CONGESTIVE HEART FAILURE (HCC): ICD-10-CM

## 2017-05-17 DIAGNOSIS — J43.9 PULMONARY EMPHYSEMA, UNSPECIFIED EMPHYSEMA TYPE (HCC): ICD-10-CM

## 2017-05-17 DIAGNOSIS — J45.20 MILD INTERMITTENT ASTHMA WITHOUT COMPLICATION: ICD-10-CM

## 2017-05-17 DIAGNOSIS — G47.33 OSA (OBSTRUCTIVE SLEEP APNEA): ICD-10-CM

## 2017-05-17 DIAGNOSIS — I25.84 CORONARY ARTERY DISEASE DUE TO CALCIFIED CORONARY LESION: ICD-10-CM

## 2017-05-17 DIAGNOSIS — I25.10 CORONARY ARTERY DISEASE DUE TO CALCIFIED CORONARY LESION: ICD-10-CM

## 2017-05-17 DIAGNOSIS — I10 HTN (HYPERTENSION), BENIGN: ICD-10-CM

## 2017-05-17 DIAGNOSIS — J44.9 CHRONIC OBSTRUCTIVE PULMONARY DISEASE, UNSPECIFIED COPD TYPE (HCC): ICD-10-CM

## 2017-05-17 DIAGNOSIS — E78.5 DYSLIPIDEMIA: ICD-10-CM

## 2017-05-17 PROCEDURE — 94060 EVALUATION OF WHEEZING: CPT | Performed by: INTERNAL MEDICINE

## 2017-05-17 PROCEDURE — 99214 OFFICE O/P EST MOD 30 MIN: CPT | Performed by: NURSE PRACTITIONER

## 2017-05-17 PROCEDURE — G8419 CALC BMI OUT NRM PARAM NOF/U: HCPCS | Performed by: NURSE PRACTITIONER

## 2017-05-17 PROCEDURE — 4040F PNEUMOC VAC/ADMIN/RCVD: CPT | Mod: 8P | Performed by: NURSE PRACTITIONER

## 2017-05-17 PROCEDURE — 94729 DIFFUSING CAPACITY: CPT | Performed by: INTERNAL MEDICINE

## 2017-05-17 PROCEDURE — 99214 OFFICE O/P EST MOD 30 MIN: CPT | Performed by: INTERNAL MEDICINE

## 2017-05-17 PROCEDURE — 1101F PT FALLS ASSESS-DOCD LE1/YR: CPT | Mod: 8P | Performed by: NURSE PRACTITIONER

## 2017-05-17 PROCEDURE — G8432 DEP SCR NOT DOC, RNG: HCPCS | Performed by: NURSE PRACTITIONER

## 2017-05-17 PROCEDURE — 1036F TOBACCO NON-USER: CPT | Performed by: NURSE PRACTITIONER

## 2017-05-17 PROCEDURE — 94726 PLETHYSMOGRAPHY LUNG VOLUMES: CPT | Performed by: INTERNAL MEDICINE

## 2017-05-17 PROCEDURE — G8598 ASA/ANTIPLAT THER USED: HCPCS | Performed by: NURSE PRACTITIONER

## 2017-05-17 RX ORDER — ALBUTEROL SULFATE 90 UG/1
2 AEROSOL, METERED RESPIRATORY (INHALATION) EVERY 4 HOURS PRN
OUTPATIENT
Start: 2017-05-17

## 2017-05-17 RX ORDER — LEVALBUTEROL TARTRATE 45 UG/1
2 AEROSOL, METERED ORAL EVERY 4 HOURS PRN
Qty: 1 INHALER | Refills: 1 | Status: SHIPPED | OUTPATIENT
Start: 2017-05-17 | End: 2017-05-17

## 2017-05-17 RX ORDER — METOLAZONE 2.5 MG/1
2.5 TABLET ORAL
COMMUNITY
End: 2018-01-01 | Stop reason: SDUPTHER

## 2017-05-17 RX ORDER — TRAZODONE HYDROCHLORIDE 100 MG/1
100 TABLET ORAL NIGHTLY PRN
Qty: 15 TAB | Refills: 0 | Status: SHIPPED | OUTPATIENT
Start: 2017-05-17 | End: 2017-01-01

## 2017-05-17 RX ORDER — FUROSEMIDE 40 MG/1
40 TABLET ORAL DAILY
Qty: 60 TAB | Refills: 11 | Status: SHIPPED | OUTPATIENT
Start: 2017-05-17 | End: 2017-01-01

## 2017-05-17 ASSESSMENT — PULMONARY FUNCTION TESTS
FEV1/FVC_PERCENT_PREDICTED: 135
FEV1/FVC_PERCENT_PREDICTED: 73
FEV1: 1.79
FEV1/FVC_PERCENT_CHANGE: 0
FEV1/FVC: 97
FVC_PERCENT_PREDICTED: 35
FVC_PERCENT_PREDICTED: 34
FEV1_PERCENT_CHANGE: 2
FEV1/FVC_PERCENT_PREDICTED: 134
FEV1/FVC: 98.4
FVC: 1.87
FEV1_PREDICTED: 3.87
FVC: 1.85
FEV1_PERCENT_PREDICTED: 46
FVC_PREDICTED: 5.29
FEV1: 1.84
FEV1_PERCENT_PREDICTED: 47
FEV1_PERCENT_CHANGE: 0

## 2017-05-17 NOTE — PROCEDURES
Technician: Kim Reyes, RRT/CPFT  Technician Comments:  Good patient effort & cooperation.  The results of this test meet the ATS/ERS standards for acceptability and repeatability.  Test was performed on the [employer] Body Plethysmograph- Elite DX system.  Predicted equations for Spirometry are NHanes, DLCO- [facility].  The DLCO was uncorrected for Hgb.  A bronchodilator of Ventolin HFA- 2puffs via spacer were administered.    1.  Spirometry shows severe lung restriction and there was not a significant improvement after bronchodilator per ATS criteria although the mid flows improved by 22%.  2.  Lung volumes show moderate lung restriction and there was also some mild air trapping  3.  Diffusion capacity is severely reduced  4.  Flow volume loops are consistent with severe lung restriction    Overall Impression: Moderate to severe restrictive lung disease associated with a severe diffusion defect

## 2017-05-17 NOTE — MR AVS SNAPSHOT
"        Abbe Bauerd   2017 8:15 AM   Office Visit   MRN: [id number]    Department:  [facility]   Dept Phone:  [phone]    Description:  Male : 1940   Provider:  Armin Dixon M.D.           Reason for Visit     Follow-Up           Allergies as of 2017     Allergen Noted Reactions    Prednisone 2012       Swelling in throat    Ciprofloxacin 2016       Per patient makes skin peel     Dairy Aid [Lactase] 2017       LACTOSE INTOLERANT    Gluten Meal 2017       BLOATED AND SICK    Latex 10/29/2013       Son says slight skin reaction      You were diagnosed with     Coronary artery disease due to calcified coronary lesion   [6216966]       HTN (hypertension), benign   [605731]       Dyslipidemia   [635873]       Chronic systolic congestive heart failure (CMS-HCC)   [261731]         Vital Signs     Blood Pressure Pulse Height Weight Body Mass Index Oxygen Saturation    120/60 mmHg 74 1.905 m (6' 3\") 97.07 kg (214 lb) 26.75 kg/m2 96%    Smoking Status                   Never Smoker            Basic Information     Date Of Birth Sex Race Ethnicity Preferred Language    1940 Male White Non- English      Your appointments     May 17, 2017  3:00 PM   Established Patient Pul with KALE Amos   [facility] (--)    [address] [facility] [address]  [facility] [id number] [phone]              Problem List              ICD-10-CM Priority Class Noted - Resolved    Coronary artery disease due to calcified coronary lesion: CABG ×5 in  I25.10, I25.84   2011 - Present    HTN (hypertension), benign I10   2011 - Present    Dyslipidemia E78.5   2011 - Present    Dyspnea on exertion R06.09   2013 - Present    GERD (gastroesophageal reflux disease) (Chronic) K21.9   Unknown - Present    Arrhythmia (Chronic) I49.9   Unknown - Present    Myocardial infarct (Chronic) I21.3   Unknown - Present    Pleural effusion " J90   10/29/2013 - Present    Chest pain R07.9   3/14/2015 - Present    Hypokalemia E87.6   3/16/2015 - Present    Atypical chest pain R07.89   3/16/2015 - Present    Mild intermittent asthma without complication J45.20   4/19/2016 - Present    Pulmonary emphysema (CMS-HCC) J43.9   4/24/2017 - Present    BRITTANY (obstructive sleep apnea) G47.33   4/24/2017 - Present    Chronic systolic congestive heart failure (CMS-HCC) I50.22   5/17/2017 - Present      Health Maintenance        Date Due Completion Dates    IMM DTaP/Tdap/Td Vaccine (1 - Tdap) 6/26/1961 ---    COLONOSCOPY 6/26/1992 ---    IMM ZOSTER VACCINE 6/26/2002 ---    IMM PNEUMOCOCCAL 65+ (ADULT) LOW/MEDIUM RISK SERIES (1 of 2 - PCV13) 6/26/2007 ---            Current Immunizations     No immunizations on file.      Below and/or attached are the medications your provider expects you to take. Review all of your home medications and newly ordered medications with your provider and/or pharmacist. Follow medication instructions as directed by your provider and/or pharmacist. Please keep your medication list with you and share with your provider. Update the information when medications are discontinued, doses are changed, or new medications (including over-the-counter products) are added; and carry medication information at all times in the event of emergency situations     Allergies:  PREDNISONE - (reactions not documented)     CIPROFLOXACIN - (reactions not documented)     DAIRY AID - (reactions not documented)     GLUTEN MEAL - (reactions not documented)     LATEX - (reactions not documented)               Medications  Valid as of: May 17, 2017 - 10:13 AM    Generic Name Brand Name Tablet Size Instructions for use    Albuterol Sulfate (AEROSOL POWDER, BREATH ACTIVATED) Albuterol Sulfate 108 (90 BASE) MCG/ACT Inhale 90 mcg by mouth every four hours as needed (sob or wheezing).        Aspirin (Chew Tab) ASA 81 MG Take 1 Tab by mouth every day.        Benazepril HCl  (Tab) LOTENSIN 20 MG Take 1 Tab by mouth every evening.        Bisoprolol Fumarate (Tab) ZEBETA 5 MG Take 1 Tab by mouth every day.        Fluconazole (Tab) DIFLUCAN 100 MG Take 1 Tab by mouth every day.        Furosemide (Tab) LASIX 40 MG Take 1 Tab by mouth every day.        Lansoprazole (CAPSULE DELAYED RELEASE) PREVACID 30 MG Take 30 mg by mouth every day.        Mometasone Furo-Formoterol Fum (Aerosol) Mometasone Furo-Formoterol Fum 100-5 MCG/ACT Inhale 2 Puffs by mouth 2 Times a Day. Use spacer. Rinse mouth after use.        Nitroglycerin (SL Tab) NITROSTAT 0.4 MG Place 1 Tab under tongue as needed for Chest Pain.        Omeprazole (CAPSULE DELAYED RELEASE) PRILOSEC 20 MG Take 20 mg by mouth every day.        PredniSONE (Tab) DELTASONE 20 MG Take 20 mg by mouth every day.        Rosuvastatin Calcium (Tab) CRESTOR 40 MG Take 1 Tab by mouth every day.        Tamsulosin HCl (Cap) FLOMAX 0.4 MG Take 0.4 mg by mouth 2 Times a Day.        Tiotropium Bromide Monohydrate (Aero Soln) Tiotropium Bromide Monohydrate 1.25 MCG/ACT Inhale 2 Inhalation by mouth every day.        Zolpidem Tartrate (Tab) AMBIEN 5 MG Take 1 Tab by mouth at bedtime as needed for Sleep (1 to 3 po qhs prn insomnia/sleep study. Bring to sleep study.).        .                 Medicines prescribed today were sent to:     PHARMACIST AT [facility], [facility]. [address] [address]    Phone: [phone] Fax: [phone]    Open 24 Hours?: No      Medication refill instructions:       If your prescription bottle indicates you have medication refills left, it is not necessary to call your provider’s office. Please contact your pharmacy and they will refill your medication.    If your prescription bottle indicates you do not have any refills left, you may request refills at any time through one of the following ways: The online [employer] system (except Urgent Care), by calling your provider’s office, or by asking your  pharmacy to contact your provider’s office with a refill request. Medication refills are processed only during regular business hours and may not be available until the next business day. Your provider may request additional information or to have a follow-up visit with you prior to refilling your medication.   *Please Note: Medication refills are assigned a new Rx number when refilled electronically. Your pharmacy may indicate that no refills were authorized even though a new prescription for the same medication is available at the pharmacy. Please request the medicine by name with the pharmacy before contacting your provider for a refill.        Your To Do List     Future Labs/Procedures Complete By Expires    BASIC METABOLIC PANEL  As directed 5/17/2018    BTYPE NATRIURETIC PEPTIDE  As directed 5/17/2018         MyChart Access Code: Activation code not generated  Current [employer] Status: Active

## 2017-05-17 NOTE — Clinical Note
[facility]   [address] [place] [address]  [place] [address]  Phone: [phone]  Fax: [phone]              Abbe Whitman  1940    Encounter Date: 5/17/2017    Armin Dixon M.D.          PROGRESS NOTE:  Subjective:   Abbe Whitman is a 76 y.o. male who presents today for followup of his coronary artery disease with recurrent pleural effusions, hypertension and hyperlipidemia. He did undergo right pleurodesis. He did develop a left pleural effusion which ultimately caused some chest discomfort and led to an admission at the end of March 2015.    At the end of March 2017, he was hospitalized at [facility]. He had an episode of neck and chest discomfort. A myocardial perfusion scan showed no evidence of ischemia. However, his left ventricular ejection fraction was reduced at about 45%. An echocardiogram also showed a reduced ejection fraction at 40-45%. He also had moderate pulmonary hypertension with an estimate right ventricular systolic pressure of about 55 mmHg.    He continued have difficulty with volume retention. He saw Dr. Shea in [place]. He was paced on increased diuretic therapy with furosemide and Zaroxolyn. With this, his volume status improved.    He is also seen in pulmonary and has significant underlying lung disease. He apparently does have obstructive sleep apnea in addition to significant hypoxemia. He is now on oxygen therapy 24/7.    On oxygen therapy, he has dyspnea on exertion at about 100 yards. He's had no PND or orthopnea. He continues to have difficulty with edema. He is having some difficulty with lightheadedness. Before his prior admission in late March, he did have an episode of near syncope. He's had no recurrent severe lightheadedness and he's had no syncope. He denies any recurrent chest discomfort or palpitations.    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   • CAD (coronary artery disease) 8/31/2011   • Dyslipidemia 8/31/2011   • Ramsay esophagus    • GERD (gastroesophageal reflux disease)    • Dyspnea on exertion 2/19/2013   • Pleural effusion 7/1/2013   • Hypertension    • HTN (hypertension) 8/31/2011   • HTN (hypertension) 2013     pt states well controlled   • Myocardial infarct (CMS-HCC) 2000   • Arrhythmia      a-fib occas   • Heart burn    • Other specified disorder of intestines 2013     constipation needs miralax daily   • CATARACT 2013   • Cold 10/20     12 hrs of diarrhea    • Indigestion    • Unspecified urinary incontinence    • Atypical chest pain 3/16/2015     Past Surgical History   Procedure Laterality Date   • Cataract extraction       both eyes   • Other       right knee orthoscopic    • Multiple coronary artery bypass endo vein harvest  10/13/2009     Performed by JULIA ALCANTARA at SURGERY [facility]S   • Maze procedure  10/13/2009     Performed by JULIA ALCANTARA at SUR[facility]ERY [facility]   • Biopsy general  10/13/2009     Performed by JULIA ALCANTARA at SURGERY [facility]   • Angiogram  2009   • Inguinal hernia repair  9/15/2010     Performed by GRIS GARCÍA at SURGERY [facility] ORS   • Thoracoscopy  10/29/2013     Performed by John H Ganser, M.D. at SURGERY [facility] ORS   • Other cardiac surgery       Family History   Problem Relation Age of Onset   • Other Mother      afib   • Heart Attack Maternal Uncle      History   Smoking status   • Never Smoker    Smokeless tobacco   • Never Used     Allergies   Allergen Reactions   • Prednisone      Swelling in throat   • Ciprofloxacin      Per patient makes skin peel    • Dairy Aid [Lactase]      LACTOSE INTOLERANT   • Gluten Meal      BLOATED AND SICK   • Latex      Son says slight skin reaction     Outpatient Encounter Prescriptions as of 5/17/2017   Medication Sig Dispense Refill   • predniSONE (DELTASONE) 20 MG Tab Take 20 mg by mouth every  "day.     • omeprazole (PRILOSEC) 20 MG delayed-release capsule Take 20 mg by mouth every day.     • furosemide (LASIX) 20 MG Tab Take 1 Tab by mouth every day.     • bisoprolol (ZEBETA) 5 MG Tab Take 1 Tab by mouth every day. 30 Tab 11   • benazepril (LOTENSIN) 20 MG Tab Take 1 Tab by mouth every evening. 90 Tab 3   • rosuvastatin (CRESTOR) 40 MG tablet Take 1 Tab by mouth every day. 90 Tab 3   • nitroglycerin (NITROSTAT) 0.4 MG SL Tab Place 1 Tab under tongue as needed for Chest Pain. 25 Tab 5   • Albuterol Sulfate 108 (90 BASE) MCG/ACT AEROSOL POWDER, BREATH ACTIVATED Inhale 90 mcg by mouth every four hours as needed (sob or wheezing). 1 Each 5   • Tiotropium Bromide Monohydrate (SPIRIVA RESPIMAT) 1.25 MCG/ACT Aero Soln Inhale 2 Inhalation by mouth every day. 1 Inhaler 5   • aspirin (ASA) 81 MG Chew Tab chewable tablet Take 1 Tab by mouth every day. 90 Tab 3   • tamsulosin (FLOMAX) 0.4 MG capsule Take 0.4 mg by mouth 2 Times a Day.     • fluconazole (DIFLUCAN) 100 MG Tab Take 1 Tab by mouth every day. 5 Tab 0   • zolpidem (AMBIEN) 5 MG Tab Take 1 Tab by mouth at bedtime as needed for Sleep (1 to 3 po qhs prn insomnia/sleep study. Bring to sleep study.). 3 Tab 0   • Mometasone Furo-Formoterol Fum (DULERA) 100-5 MCG/ACT Aerosol Inhale 2 Puffs by mouth 2 Times a Day. Use spacer. Rinse mouth after use. 1 Inhaler 0   • lansoprazole (PREVACID) 30 MG CPDR Take 30 mg by mouth every day.       Facility-Administered Encounter Medications as of 5/17/2017   Medication Dose Route Frequency Provider Last Rate Last Dose   • albuterol (VENTOLIN or PROVENTIL) inhaler 2 Puff  2 Puff Inhalation Q4HRS PRN KALE Amos       Objective:   /60 mmHg  Pulse 74  Ht 1.905 m (6' 3\")  Wt 97.07 kg (214 lb)  BMI 26.75 kg/m2  SpO2 96%    Physical Exam   Neck: No JVD present.   Cardiovascular: Normal rate and regular rhythm.  Exam reveals no gallop.    No murmur heard.  Pulmonary/Chest: Effort normal. He has " no rales.   Abdominal: Soft. There is no tenderness.   Musculoskeletal: He exhibits edema (2+ pretibial).     Myocardial perfusion scan:  IMPRESSIONS  Normal left ventricular size, ejection fraction, and wall motion.  No evidence of significant jeopardized viable myocardium or prior myocardial   Infarction.  Normal left ventricular wall motion. LV ejection fraction = 53%.  Exam Date: 03/15/2015 10:37    Echo:  CONCLUSIONS  Technically difficult study.   Mildly reduced left ventricular systolic function.  Left ventricular ejection fraction is 45% to 50%.  Mild mitral regurgitation.  Moderate tricuspid regurgitation.  Right ventricular systolic pressure is estimated to be 40 to 45 mmHg.  Exam Date: 03/15/2015     Laboratory from August 22: Laboratory from February 23: Sodium 140, potassium 3.2, bilirubin 1.8. BUN 18 and creatinine 1.3.    Laboratory from December 2016: Total cholesterol 89, triglycerides 28, HDL 36 and LDL 47    Assessment:     1. Coronary artery disease due to calcified coronary lesion: CABG ×5 in 2009     2. HTN (hypertension), benign     3. Dyslipidemia     4. Chronic systolic congestive heart failure (CMS-HCC)         Medical Decision Making:  Today's Assessment / Status / Plan:     Mr. Whitman is having difficulty with congestive heart failure and his underlying pulmonary disease. At this time, we will place him back on furosemide therapy 40 mEq twice a day. He will follow-up in about 2 weeks with a BMP and BNP. His blood pressure and lipids status of that under good control.    Please FAX copy of note to Dr. Raza Shea in [place].        Skyla Foy M.D.  [address] [place] [id number]  VIA Facsimile: [phone]

## 2017-05-17 NOTE — PROGRESS NOTES
CC:  Here for f/u sleep and pulmonary issues as listed below    HPI:   Abbe presents today for follow up for Asthma and PFT and sleep study results. PFTs from 5/2017 have mild declined since 2015 with no change in the TLC and indicate a Fev1 of 1.79L or 46% predicted without bronchodilator response, Fev1/FVC ratio of 97, TLC 54%, DLCO 39%. CAT scan from 12/2015 showed trace loculated pleural effusion and bilateral subpleural bands of sub-segmental atelectasis and/or scarring, which could be related to his restrictive changes found on PFTs per Dr. Cevallos.       Patient was trialed on Anoro, thought beneficial, but cost too much. He has been taking Breo 100-5 1 puff, once a day, sporadically and having frequent thrush. He was given Diflucan with benefit last office visit. He continues to c/o SE of sore throat.He rarely uses his pro-air. He would like to go back to anoro or a substitute. He continues to have nasal congestion and cough with white mucus, from allergies.  I concur to use nasal steroid spray. Patient believes his dyspnea is stable and drastically better since adding the oxygen 24/7 at 3L.  He denies wheezing, hemoptysis, chest pain chest tightness, fevers or chills, acid reflux, morning headaches.  He is compliant using 2-1/2 L of oxygen at night.       PSG from 5/2017 indicated an AHI of 33.9 and low oxygen of 76%. Sleep efficiency was decreased less than 30%. He tried the Ambien but had side effects of feeling foggy He was not started on treatment the night of the study. They did apply supplemental oxygen to keep his oxygen saturations above 90% and did well at 3 L of oxygen. Results were discussed with patient and is amenable to a titration study.   Patient is currently sleeping 8-9 hours per night with 2-3x nighttime awakenings.  He sleeps every 3 hours max until he urinates. They have no trouble falling asleep.   They typically feel refreshed in the morning, but after he takes off his oxygen he feels  "\"out of it\".  He then checks his oxygen which has been in the mid 70s. He occasionally morning H/A. They feel tired throughout the day and naps daily for appx 30 min long.  Patient reports snoring and denies apnea events and paroxysmal nocturnal dyspnea events. They have never fallen asleep in conversation, at the wheel, or at work.  They deny sleepwalking/talking.         Patient Active Problem List    Diagnosis Date Noted   • Chronic systolic congestive heart failure (CMS-HCC) 05/17/2017   • Chronic obstructive pulmonary disease (CMS-HCC) 05/17/2017   • Pulmonary emphysema (CMS-HCC) 04/24/2017   • BRITTANY (obstructive sleep apnea) 04/24/2017   • Mild intermittent asthma without complication 04/19/2016   • Hypokalemia 03/16/2015   • Atypical chest pain 03/16/2015   • Chest pain 03/14/2015   • Pleural effusion 10/29/2013   • GERD (gastroesophageal reflux disease)    • Arrhythmia    • Myocardial infarct    • Dyspnea on exertion 02/19/2013   • Coronary artery disease due to calcified coronary lesion: CABG ×5 in 2009 08/31/2011   • HTN (hypertension), benign 08/31/2011   • Dyslipidemia 08/31/2011       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   • CAD (coronary artery disease) 8/31/2011   • Dyslipidemia 8/31/2011   • Ramsay esophagus    • GERD (gastroesophageal reflux disease)    • Dyspnea on exertion 2/19/2013   • Pleural effusion 7/1/2013   • Hypertension    • HTN (hypertension) 8/31/2011   • HTN (hypertension) 2013     pt states well controlled   • Myocardial infarct (CMS-HCC) 2000   • Arrhythmia      a-fib occas   • Heart burn    • Other specified disorder of intestines 2013     constipation needs miralax daily   • CATARACT 2013   • Cold 10/20     12 hrs of diarrhea    • Indigestion    • Unspecified urinary incontinence    • Atypical chest pain 3/16/2015       Past Surgical History   Procedure Laterality Date   • Cataract extraction       both eyes "   • Other       right knee orthoscopic    • Multiple coronary artery bypass endo vein harvest  10/13/2009     Performed by JULIA ALCANTARA at [facility]   • Maze procedure  10/13/2009     Performed by JULIA ALCANTARA at [facility]   • Biopsy general  10/13/2009     Performed by JULIA ALCANTARA at [facility]   • Angiogram  2009   • Inguinal hernia repair  9/15/2010     Performed by GRIS GARCÍA at [facility]   • Thoracoscopy  10/29/2013     Performed by John H Ganser, M.D. at [facility]   • Other cardiac surgery         Family History   Problem Relation Age of Onset   • Other Mother      afib   • Heart Attack Maternal Uncle        Social History     Social History   • Marital Status:      Spouse Name: N/A   • Number of Children: N/A   • Years of Education: N/A     Occupational History   • Not on file.     Social History Main Topics   • Smoking status: Never Smoker    • Smokeless tobacco: Never Used   • Alcohol Use: No   • Drug Use: No   • Sexual Activity: Not on file     Other Topics Concern   • Not on file     Social History Narrative       Current Outpatient Prescriptions   Medication Sig Dispense Refill   • metolazone (ZAROXOLYN) 2.5 MG Tab Take 2.5 mg by mouth every day.     • Umeclidinium-Vilanterol (ANORO ELLIPTA) 62.5-25 MCG/INH AEROSOL POWDER, BREATH ACTIVATED Take 1 Inhalation by mouth every day. 1 Each 11   • trazodone (DESYREL) 100 MG Tab Take 1 Tab by mouth at bedtime as needed for Sleep. 15 Tab 0   • aspirin (ASA) 81 MG Chew Tab chewable tablet Take 1 Tab by mouth every day. 90 Tab 3   • furosemide (LASIX) 40 MG Tab Take 1 Tab by mouth every day. 60 Tab 11   • predniSONE (DELTASONE) 20 MG Tab Take 20 mg by mouth every day.     • omeprazole (PRILOSEC) 20 MG delayed-release capsule Take 20 mg by mouth every day.     • fluconazole (DIFLUCAN) 100 MG Tab Take 1 Tab by mouth every day. 5 Tab 0   • zolpidem (AMBIEN) 5 MG Tab Take 1 Tab by  "mouth at bedtime as needed for Sleep (1 to 3 po qhs prn insomnia/sleep study. Bring to sleep study.). (Patient not taking: Reported on 5/17/2017) 3 Tab 0   • bisoprolol (ZEBETA) 5 MG Tab Take 1 Tab by mouth every day. 30 Tab 11   • benazepril (LOTENSIN) 20 MG Tab Take 1 Tab by mouth every evening. 90 Tab 3   • rosuvastatin (CRESTOR) 40 MG tablet Take 1 Tab by mouth every day. 90 Tab 3   • nitroglycerin (NITROSTAT) 0.4 MG SL Tab Place 1 Tab under tongue as needed for Chest Pain. 25 Tab 5   • lansoprazole (PREVACID) 30 MG CPDR Take 30 mg by mouth every day.     • tamsulosin (FLOMAX) 0.4 MG capsule Take 0.4 mg by mouth 2 Times a Day.       Current Facility-Administered Medications   Medication Dose Route Frequency Provider Last Rate Last Dose   • albuterol inhaler 2 Puff  2 Puff Inhalation Q4HRS PRN Julianne Almaraz, A.P.R.N.              Allergies: Prednisone; Ciprofloxacin; Dairy aid; Gluten meal; and Latex      ROS   Gen: Denies fever, chills, unintentional weight loss, fatigue, night sweats  E/N/T: Denies ear pain,   Resp:Denies Dyspnea, wheezing, putum production, hemoptysis  CV: Denies chest pain, chest tightness, palpitations, BLE edema  Sleep:Denies morning headache, insomnia,   Neuro: Denies frequent headaches, weakness, dizziness  GI: Denies N/V, acid reflux/heartburn  See HPI.  All other systems reviewed and negative      Vital signs for this encounter:  Filed Vitals:    05/17/17 1445   Height: 1.905 m (6' 3\")   Weight: 97.07 kg (214 lb)   Weight % change since last entry.: 0 %   BP: 102/62   Pulse: 77   BMI (Calculated): 26.75   Resp: 16   Temp: 37.4 °C (99.3 °F)   O2 sat % room air: 97 %                 Physical Exam:   Appearance: well developed, well nourished, no acute distress.  Eyes: PERRL, EOM intact, sclere white, conjunctiva moist.  Ears: no lesions or deformities.  Hearing: grossly intact.  Nose: no lesions or deformities.  Dentition: good dentition.  Oropharynx: tongue normal, posterior pharynx " without erythema or exudate.  Neck: supple, trachea midline, no masses.  Respiratory effort: no intercostal retractions or use of accessory muscles.  Lung auscultation: Bilateral diminished   Heart auscultation: no murmur, rub, or gallop.   Extremities: no cyanosis or edema.  Abdomen: soft, non-tender, no masses.  Gait and station: grossly normal   Digits and Nails: no clubbing, cyanosis, petechiae, or nodes.  Cranial nerves: grossly normal.  Motor: no focal deficits observed.  Skin: no rashes, lesions, or ulcers noted.  Orientation: oriented to time, place, and person.  Mood and affect: mood and affect appropriate, normal interaction with examiner.    Assessment   1. Chronic obstructive pulmonary disease, unspecified COPD type (CMS-HCC)  Umeclidinium-Vilanterol (ANORO ELLIPTA) 62.5-25 MCG/INH AEROSOL POWDER, BREATH ACTIVATED    trazodone (DESYREL) 100 MG Tab    POLYSOMNOGRAPHY TITRATION    DISCONTINUED: levalbuterol (XOPENEX HFA) 45 MCG/ACT inhaler   2. Mild intermittent asthma without complication  albuterol inhaler 2 Puff    trazodone (DESYREL) 100 MG Tab    POLYSOMNOGRAPHY TITRATION   3. BRITTANY (obstructive sleep apnea)  trazodone (DESYREL) 100 MG Tab    POLYSOMNOGRAPHY TITRATION       PLAN:   Patient Instructions   1) Start using Anoro again and stop Breo. Continue rescue inhaler    2) Continue nocturnal oxygen and increase to 3L until starts sleep machine  3) Continue daily exercise   4) Vaccines: Up to date with Prevnar and Pneumococcal  5) Continue daytime oxygen at 3L    6) Sleep study titration. Trazodone sleeping pill  7) Return in about 6 weeks (around 6/28/2017) for sleep study results, review of symptoms, if not sooner, follow up with ALBERTO Valencia.

## 2017-05-17 NOTE — PATIENT INSTRUCTIONS
1) Start using Anoro again and stop Breo. Continue rescue inhaler    2) Continue nocturnal oxygen and increase to 3L until starts sleep machine  3) Continue daily exercise   4) Vaccines: Up to date with Prevnar and Pneumococcal  5) Continue daytime oxygen at 3L    6) Sleep study titration. Trazodone sleeping pill  7) Return in about 6 weeks (around 6/28/2017) for sleep study results, review of symptoms, if not sooner, follow up with ALBERTO Valencia.

## 2017-05-17 NOTE — PROGRESS NOTES
Subjective:   Abbe Whitman is a 76 y.o. male who presents today for followup of his coronary artery disease with recurrent pleural effusions, hypertension and hyperlipidemia. He did undergo right pleurodesis. He did develop a left pleural effusion which ultimately caused some chest discomfort and led to an admission at the end of March 2015.    At the end of March 2017, he was hospitalized at [facility]. He had an episode of neck and chest discomfort. A myocardial perfusion scan showed no evidence of ischemia. However, his left ventricular ejection fraction was reduced at about 45%. An echocardiogram also showed a reduced ejection fraction at 40-45%. He also had moderate pulmonary hypertension with an estimate right ventricular systolic pressure of about 55 mmHg.    He continued have difficulty with volume retention. He saw Dr. Shea in [place]. He was paced on increased diuretic therapy with furosemide and Zaroxolyn. With this, his volume status improved.    He is also seen in pulmonary and has significant underlying lung disease. He apparently does have obstructive sleep apnea in addition to significant hypoxemia. He is now on oxygen therapy 24/7.    On oxygen therapy, he has dyspnea on exertion at about 100 yards. He's had no PND or orthopnea. He continues to have difficulty with edema. He is having some difficulty with lightheadedness. Before his prior admission in late March, he did have an episode of near syncope. He's had no recurrent severe lightheadedness and he's had no syncope. He denies any recurrent chest discomfort or palpitations.    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   • CAD (coronary artery disease) 8/31/2011   • Dyslipidemia 8/31/2011   • Ramsay esophagus    • GERD (gastroesophageal reflux disease)    • Dyspnea on exertion 2/19/2013   • Pleural effusion 7/1/2013   • Hypertension    • HTN  (hypertension) 8/31/2011   • HTN (hypertension) 2013     pt states well controlled   • Myocardial infarct (CMS-HCC) 2000   • Arrhythmia      a-fib occas   • Heart burn    • Other specified disorder of intestines 2013     constipation needs miralax daily   • CATARACT 2013   • Cold 10/20     12 hrs of diarrhea    • Indigestion    • Unspecified urinary incontinence    • Atypical chest pain 3/16/2015     Past Surgical History   Procedure Laterality Date   • Cataract extraction       both eyes   • Other       right knee orthoscopic    • Multiple coronary artery bypass endo vein harvest  10/13/2009     Performed by JULIA ALCANTARA at [facility]   • Maze procedure  10/13/2009     Performed by JULIA ALCANTARA at [facility]   • Biopsy general  10/13/2009     Performed by JULIA ALCANTARA at [facility]   • Angiogram  2009   • Inguinal hernia repair  9/15/2010     Performed by GRIS GARCÍA at [facility]   • Thoracoscopy  10/29/2013     Performed by John H Ganser, M.D. at [facility]   • Other cardiac surgery       Family History   Problem Relation Age of Onset   • Other Mother      afib   • Heart Attack Maternal Uncle      History   Smoking status   • Never Smoker    Smokeless tobacco   • Never Used     Allergies   Allergen Reactions   • Prednisone      Swelling in throat   • Ciprofloxacin      Per patient makes skin peel    • Dairy Aid [Lactase]      LACTOSE INTOLERANT   • Gluten Meal      BLOATED AND SICK   • Latex      Son says slight skin reaction     Outpatient Encounter Prescriptions as of 5/17/2017   Medication Sig Dispense Refill   • predniSONE (DELTASONE) 20 MG Tab Take 20 mg by mouth every day.     • omeprazole (PRILOSEC) 20 MG delayed-release capsule Take 20 mg by mouth every day.     • furosemide (LASIX) 20 MG Tab Take 1 Tab by mouth every day.     • bisoprolol (ZEBETA) 5 MG Tab Take 1 Tab by mouth every day. 30 Tab 11   • benazepril (LOTENSIN) 20 MG  "Tab Take 1 Tab by mouth every evening. 90 Tab 3   • rosuvastatin (CRESTOR) 40 MG tablet Take 1 Tab by mouth every day. 90 Tab 3   • nitroglycerin (NITROSTAT) 0.4 MG SL Tab Place 1 Tab under tongue as needed for Chest Pain. 25 Tab 5   • Albuterol Sulfate 108 (90 BASE) MCG/ACT AEROSOL POWDER, BREATH ACTIVATED Inhale 90 mcg by mouth every four hours as needed (sob or wheezing). 1 Each 5   • Tiotropium Bromide Monohydrate (SPIRIVA RESPIMAT) 1.25 MCG/ACT Aero Soln Inhale 2 Inhalation by mouth every day. 1 Inhaler 5   • aspirin (ASA) 81 MG Chew Tab chewable tablet Take 1 Tab by mouth every day. 90 Tab 3   • tamsulosin (FLOMAX) 0.4 MG capsule Take 0.4 mg by mouth 2 Times a Day.     • fluconazole (DIFLUCAN) 100 MG Tab Take 1 Tab by mouth every day. 5 Tab 0   • zolpidem (AMBIEN) 5 MG Tab Take 1 Tab by mouth at bedtime as needed for Sleep (1 to 3 po qhs prn insomnia/sleep study. Bring to sleep study.). 3 Tab 0   • Mometasone Furo-Formoterol Fum (DULERA) 100-5 MCG/ACT Aerosol Inhale 2 Puffs by mouth 2 Times a Day. Use spacer. Rinse mouth after use. 1 Inhaler 0   • lansoprazole (PREVACID) 30 MG CPDR Take 30 mg by mouth every day.       Facility-Administered Encounter Medications as of 5/17/2017   Medication Dose Route Frequency Provider Last Rate Last Dose   • albuterol (VENTOLIN or PROVENTIL) inhaler 2 Puff  2 Puff Inhalation Q4HRS PRN KALE Amos       Objective:   /60 mmHg  Pulse 74  Ht 1.905 m (6' 3\")  Wt 97.07 kg (214 lb)  BMI 26.75 kg/m2  SpO2 96%    Physical Exam   Neck: No JVD present.   Cardiovascular: Normal rate and regular rhythm.  Exam reveals no gallop.    No murmur heard.  Pulmonary/Chest: Effort normal. He has no rales.   Abdominal: Soft. There is no tenderness.   Musculoskeletal: He exhibits edema (2+ pretibial).     Myocardial perfusion scan:  IMPRESSIONS  Normal left ventricular size, ejection fraction, and wall motion.  No evidence of significant jeopardized viable " myocardium or prior myocardial   Infarction.  Normal left ventricular wall motion. LV ejection fraction = 53%.  Exam Date: 03/15/2015 10:37    Echo:  CONCLUSIONS  Technically difficult study.   Mildly reduced left ventricular systolic function.  Left ventricular ejection fraction is 45% to 50%.  Mild mitral regurgitation.  Moderate tricuspid regurgitation.  Right ventricular systolic pressure is estimated to be 40 to 45 mmHg.  Exam Date: 03/15/2015     Laboratory from August 22: Laboratory from February 23: Sodium 140, potassium 3.2, bilirubin 1.8. BUN 18 and creatinine 1.3.    Laboratory from December 2016: Total cholesterol 89, triglycerides 28, HDL 36 and LDL 47    Assessment:     1. Coronary artery disease due to calcified coronary lesion: CABG ×5 in 2009     2. HTN (hypertension), benign     3. Dyslipidemia     4. Chronic systolic congestive heart failure (CMS-HCC)         Medical Decision Making:  Today's Assessment / Status / Plan:     Mr. Whitman is having difficulty with congestive heart failure and his underlying pulmonary disease. At this time, we will place him back on furosemide therapy 40 mEq twice a day. He will follow-up in about 2 weeks with a BMP and BNP. His blood pressure and lipids status of that under good control.    Please FAX copy of note to Dr. Raza Shea in [place].

## 2017-05-17 NOTE — MR AVS SNAPSHOT
"        Abbe Bauerd   2017 3:00 PM   Office Visit   MRN: [id number]    Department:  [facility]   Dept Phone:  [phone]    Description:  Male : 1940   Provider:  KALE Amos           Reason for Visit     Apnea Sleep Study Reslts/ PFT      Allergies as of 2017     Allergen Noted Reactions    Prednisone 2012       Swelling in throat    Ciprofloxacin 2016       Per patient makes skin peel     Dairy Aid [Lactase] 2017       LACTOSE INTOLERANT    Gluten Meal 2017       BLOATED AND SICK    Latex 10/29/2013       Son says slight skin reaction      You were diagnosed with     Chronic obstructive pulmonary disease, unspecified COPD type (CMS-HCC)   [3710672]       Mild intermittent asthma without complication   [746075]       BRITTANY (obstructive sleep apnea)   [[id number]]         Vital Signs     Blood Pressure Pulse Temperature Respirations Height Weight    102/62 mmHg 77 37.4 °C (99.3 °F) 16 1.905 m (6' 3\") 97.07 kg (214 lb)    Body Mass Index Oxygen Saturation Smoking Status             26.75 kg/m2 97% Never Smoker          Basic Information     Date Of Birth Sex Race Ethnicity Preferred Language    1940 Male White Non- English      Problem List              ICD-10-CM Priority Class Noted - Resolved    Coronary artery disease due to calcified coronary lesion: CABG ×5 in  I25.10, I25.84   2011 - Present    HTN (hypertension), benign I10   2011 - Present    Dyslipidemia E78.5   2011 - Present    Dyspnea on exertion R06.09   2013 - Present    GERD (gastroesophageal reflux disease) (Chronic) K21.9   Unknown - Present    Arrhythmia (Chronic) I49.9   Unknown - Present    Myocardial infarct (Chronic) I21.3   Unknown - Present    Pleural effusion J90   10/29/2013 - Present    Chest pain R07.9   3/14/2015 - Present    Hypokalemia E87.6   3/16/2015 - Present    Atypical chest pain R07.89   3/16/2015 - Present    Mild " intermittent asthma without complication J45.20   4/19/2016 - Present    Pulmonary emphysema (CMS-HCC) J43.9   4/24/2017 - Present    BRITTANY (obstructive sleep apnea) G47.33   4/24/2017 - Present    Chronic systolic congestive heart failure (CMS-HCC) I50.22   5/17/2017 - Present      Health Maintenance        Date Due Completion Dates    IMM DTaP/Tdap/Td Vaccine (1 - Tdap) 6/26/1961 ---    COLONOSCOPY 6/26/1992 ---    IMM ZOSTER VACCINE 6/26/2002 ---    IMM PNEUMOCOCCAL 65+ (ADULT) LOW/MEDIUM RISK SERIES (1 of 2 - PCV13) 6/26/2007 ---            Current Immunizations     No immunizations on file.      Below and/or attached are the medications your provider expects you to take. Review all of your home medications and newly ordered medications with your provider and/or pharmacist. Follow medication instructions as directed by your provider and/or pharmacist. Please keep your medication list with you and share with your provider. Update the information when medications are discontinued, doses are changed, or new medications (including over-the-counter products) are added; and carry medication information at all times in the event of emergency situations     Allergies:  PREDNISONE - (reactions not documented)     CIPROFLOXACIN - (reactions not documented)     DAIRY AID - (reactions not documented)     GLUTEN MEAL - (reactions not documented)     LATEX - (reactions not documented)               Medications  Valid as of: May 17, 2017 -  3:42 PM    Generic Name Brand Name Tablet Size Instructions for use    Aspirin (Chew Tab) ASA 81 MG Take 1 Tab by mouth every day.        Benazepril HCl (Tab) LOTENSIN 20 MG Take 1 Tab by mouth every evening.        Bisoprolol Fumarate (Tab) ZEBETA 5 MG Take 1 Tab by mouth every day.        Fluconazole (Tab) DIFLUCAN 100 MG Take 1 Tab by mouth every day.        Furosemide (Tab) LASIX 40 MG Take 1 Tab by mouth every day.        Lansoprazole (CAPSULE DELAYED RELEASE) PREVACID 30 MG Take 30 mg  by mouth every day.        MetOLazone (Tab) ZAROXOLYN 2.5 MG Take 2.5 mg by mouth every day.        Nitroglycerin (SL Tab) NITROSTAT 0.4 MG Place 1 Tab under tongue as needed for Chest Pain.        Omeprazole (CAPSULE DELAYED RELEASE) PRILOSEC 20 MG Take 20 mg by mouth every day.        PredniSONE (Tab) DELTASONE 20 MG Take 20 mg by mouth every day.        Rosuvastatin Calcium (Tab) CRESTOR 40 MG Take 1 Tab by mouth every day.        Tamsulosin HCl (Cap) FLOMAX 0.4 MG Take 0.4 mg by mouth 2 Times a Day.        TraZODone HCl (Tab) DESYREL 100 MG Take 1 Tab by mouth at bedtime as needed for Sleep.        Umeclidinium-Vilanterol (AEROSOL POWDER, BREATH ACTIVATED) Umeclidinium-Vilanterol 62.5-25 MCG/INH Take 1 Inhalation by mouth every day.        Zolpidem Tartrate (Tab) AMBIEN 5 MG Take 1 Tab by mouth at bedtime as needed for Sleep (1 to 3 po qhs prn insomnia/sleep study. Bring to sleep study.).        .                 Medicines prescribed today were sent to:     PHARMACIST AT [facility]. [address] [address]    Phone: [phone] Fax: [phone]    Open 24 Hours?: No      Medication refill instructions:       If your prescription bottle indicates you have medication refills left, it is not necessary to call your provider’s office. Please contact your pharmacy and they will refill your medication.    If your prescription bottle indicates you do not have any refills left, you may request refills at any time through one of the following ways: The online [employer] system (except Urgent Care), by calling your provider’s office, or by asking your pharmacy to contact your provider’s office with a refill request. Medication refills are processed only during regular business hours and may not be available until the next business day. Your provider may request additional information or to have a follow-up visit with you prior to refilling your medication.   *Please Note:  Medication refills are assigned a new Rx number when refilled electronically. Your pharmacy may indicate that no refills were authorized even though a new prescription for the same medication is available at the pharmacy. Please request the medicine by name with the pharmacy before contacting your provider for a refill.        Your To Do List     Future Labs/Procedures Complete By Expires    POLYSOMNOGRAPHY TITRATION  As directed 5/17/2018      Instructions    1) Start using Anoro again and stop Breo. Continue rescue inhaler    2) Continue nocturnal oxygen and increase to 3L until starts sleep machine  3) Continue daily exercise   4) Vaccines: Up to date with Prevnar and Pneumococcal  5) Continue daytime oxygen at 3L    6) Sleep study titration. Trazodone sleeping pill  7) Return in about 6 weeks (around 6/28/2017) for sleep study results, review of symptoms, if not sooner, follow up with ALBERTO Valencia.              [place] [employer] Access Code: Activation code not generated  Current [employer] Status: Active

## 2017-05-22 NOTE — PROGRESS NOTES
Heart Failure New Appointment     Patient not seen, will have 6MWT/MLWHF Questionnaire and HF New Education at next visit.      Lakesha HF RN  [phone]

## 2017-07-11 ENCOUNTER — RX ONLY (OUTPATIENT)
Age: 77
Setting detail: RX ONLY
End: 2017-07-11

## 2017-07-11 PROBLEM — I87.2 VENOUS INSUFFICIENCY (CHRONIC) (PERIPHERAL): Status: ACTIVE | Noted: 2017-07-11

## 2017-07-12 NOTE — TELEPHONE ENCOUNTER
1. Caller Name: Ed                                         Call Back Number: [phone] (home) [phone] (work)        Patient approves a detailed voicemail message: yes    Patient called would like to know why he is completing another Sleep study. He is scheduled to complete POLYSOMNOGRAPHY TITRATION 7/16/17 and follow up 7/24/17. Would like to also know if we had openings for the following Monday after his sleep study, I informed him unfortunately no openings for any providers that day. And no opening for sleep study for Sunday 7/23/17. He would like results over the phone because he will have to drive into town from [place], [place].

## 2017-07-16 NOTE — MR AVS SNAPSHOT
Abbe Whitman   2017 8:10 PM   Sleep Study   MRN: [id number]    Department:  [facility]   Dept Phone:  [phone]    Description:  Male : 1940   Provider:  Jerrell Reardon M.D.           Allergies as of 2017     Allergen Noted Reactions    Prednisone 2012       Swelling in throat    Ciprofloxacin 2016       Per patient makes skin peel     Dairy Aid [Lactase] 2017       LACTOSE INTOLERANT    Gluten Meal 2017       BLOATED AND SICK    Latex 10/29/2013       Son says slight skin reaction      You were diagnosed with     Chronic obstructive pulmonary disease, unspecified COPD type (CMS-HCC)   [9955975]       Mild intermittent asthma without complication   [189190]       BRITTANY (obstructive sleep apnea)   [[id number]]         Vital Signs     Smoking Status                   Never Smoker            Basic Information     Date Of Birth Sex Race Ethnicity Preferred Language    1940 Male White Non- English      Your appointments     Aug 30, 2017  8:40 AM   Follow UP with KALE Amos   [facility] (--)    [address] [place] [id number]   [phone]              Problem List              ICD-10-CM Priority Class Noted - Resolved    Coronary artery disease due to calcified coronary lesion: CABG ×5 in  I25.10, I25.84   2011 - Present    HTN (hypertension), benign I10   2011 - Present    Dyslipidemia E78.5   2011 - Present    Dyspnea on exertion R06.09   2013 - Present    GERD (gastroesophageal reflux disease) (Chronic) K21.9   Unknown - Present    Arrhythmia (Chronic) I49.9   Unknown - Present    Myocardial infarct (Chronic) I21.3   Unknown - Present    Pleural effusion J90   10/29/2013 - Present    Chest pain R07.9   3/14/2015 - Present    Hypokalemia E87.6   3/16/2015 - Present    Atypical chest pain R07.89   3/16/2015 - Present    Mild intermittent asthma without  complication J45.20   4/19/2016 - Present    Pulmonary emphysema (CMS-HCC) J43.9   4/24/2017 - Present    BRITTANY (obstructive sleep apnea) G47.33   4/24/2017 - Present    Chronic systolic congestive heart failure (CMS-HCC) I50.22   5/17/2017 - Present    Chronic obstructive pulmonary disease (CMS-HCC) J44.9   5/17/2017 - Present      Health Maintenance        Date Due Completion Dates    IMM DTaP/Tdap/Td Vaccine (1 - Tdap) 6/26/1961 ---    COLONOSCOPY 6/26/1992 ---    IMM ZOSTER VACCINE 6/26/2002 ---    IMM PNEUMOCOCCAL 65+ (ADULT) LOW/MEDIUM RISK SERIES (1 of 2 - PCV13) 6/26/2007 ---    IMM INFLUENZA (1) 9/1/2017 ---            Current Immunizations     No immunizations on file.      Below and/or attached are the medications your provider expects you to take. Review all of your home medications and newly ordered medications with your provider and/or pharmacist. Follow medication instructions as directed by your provider and/or pharmacist. Please keep your medication list with you and share with your provider. Update the information when medications are discontinued, doses are changed, or new medications (including over-the-counter products) are added; and carry medication information at all times in the event of emergency situations     Allergies:  PREDNISONE - (reactions not documented)     CIPROFLOXACIN - (reactions not documented)     DAIRY AID - (reactions not documented)     GLUTEN MEAL - (reactions not documented)     LATEX - (reactions not documented)               Medications  Valid as of: July 17, 2017 -  6:29 AM    Generic Name Brand Name Tablet Size Instructions for use    Aspirin (Chew Tab) ASA 81 MG Take 1 Tab by mouth every day.        Benazepril HCl (Tab) LOTENSIN 20 MG Take 1 Tab by mouth every evening.        Bisoprolol Fumarate (Tab) ZEBETA 5 MG Take 1 Tab by mouth every day.        Fluconazole (Tab) DIFLUCAN 100 MG Take 1 Tab by mouth every day.        Furosemide (Tab) LASIX 40 MG Take 1 Tab by  mouth every day.        Lansoprazole (CAPSULE DELAYED RELEASE) PREVACID 30 MG Take 30 mg by mouth every day.        MetOLazone (Tab) ZAROXOLYN 2.5 MG Take 2.5 mg by mouth every day.        Nitroglycerin (SL Tab) NITROSTAT 0.4 MG Place 1 Tab under tongue as needed for Chest Pain.        Omeprazole (CAPSULE DELAYED RELEASE) PRILOSEC 20 MG Take 20 mg by mouth every day.        PredniSONE (Tab) DELTASONE 20 MG Take 20 mg by mouth every day.        Rosuvastatin Calcium (Tab) CRESTOR 40 MG Take 1 Tab by mouth every day.        Tamsulosin HCl (Cap) FLOMAX 0.4 MG Take 0.4 mg by mouth 2 Times a Day.        TraZODone HCl (Tab) DESYREL 100 MG Take 1 Tab by mouth at bedtime as needed for Sleep.        Umeclidinium-Vilanterol (AEROSOL POWDER, BREATH ACTIVATED) Umeclidinium-Vilanterol 62.5-25 MCG/INH Take 1 Inhalation by mouth every day.        Zolpidem Tartrate (Tab) AMBIEN 5 MG Take 1 Tab by mouth at bedtime as needed for Sleep (1 to 3 po qhs prn insomnia/sleep study. Bring to sleep study.).        .                 Medicines prescribed today were sent to:     PHARMACIST AT [facility]. [address] [address]    Phone: [phone] Fax: [phone]    Open 24 Hours?: No      Medication refill instructions:       If your prescription bottle indicates you have medication refills left, it is not necessary to call your provider’s office. Please contact your pharmacy and they will refill your medication.    If your prescription bottle indicates you do not have any refills left, you may request refills at any time through one of the following ways: The online [employer] system (except Urgent Care), by calling your provider’s office, or by asking your pharmacy to contact your provider’s office with a refill request. Medication refills are processed only during regular business hours and may not be available until the next business day. Your provider may request additional information or to  have a follow-up visit with you prior to refilling your medication.   *Please Note: Medication refills are assigned a new Rx number when refilled electronically. Your pharmacy may indicate that no refills were authorized even though a new prescription for the same medication is available at the pharmacy. Please request the medicine by name with the pharmacy before contacting your provider for a refill.           [facility] [employer] Access Code: Activation code not generated  Current [employer] Status: Active

## 2017-07-17 NOTE — PROCEDURES
Clinical Comments:  The patient underwent a CPAP titration using the standard montage for measurement of parameters of sleep, respiratory events, movement abnormalities, heart rate and rhythm. A microphone was used to monitor snoring.      INTERPRETATION:  The total recording time was 521.8 minutes with a sleep period of 384.7 minutes and the total sleep time was 177.5 minutes with a sleep efficiency of 34.0%.  The sleep latency was 137.0 minutes, and REM latency was 285.0 minutes.  The patient experienced 44 arousals in total, for an arousal index of 14.9    RESPIRATORY: The patient had 0 apneas in total.  Of these, 0 were obstructive apneas, and 0 were central apneas.  This resulted in an apnea index (AI) of 0.0.  The patient had 28 hypopneas, for a hypopnea index of 9.5.  The overall AHI was 9.5, while the AHI during Stage R sleep was 27.9.  AHI while supine was 10.2.    OXIMETRY: Oxygen saturation monitoring showed a mean SpO2 of 90.6%, with a minimum oxygen saturation of 79.0%.  Oxygen saturations were less than or = 89% for 53.9 minutes of sleep time.    CARDIAC: The highest heart rate during the recording was 94.0 beats per minute.  The average heart rate during sleep was 70.6 bpm.    LIMB MOVEMENTS: There were a total of 61 PLMs during sleep, of which 2 were PLMs arousals.  This resulted in a PLMS index of 20.6.    CPAP was tried from 5 to 10 cm H2O.    Interpretation:    This was an overnight positive airway pressure titration. The patient had a sleep latency of 137 minutes and 207.2 minutes of wake after sleep onset which complicated the positive airway pressure titration. The technician started treatment at 5 cm and raised the pressure to a high of 10 cm of water pressure. The patient's response was variable and not predictable. Supplemental oxygen was utilized at 2-3 L a minute for low saturations. The patient may have done best on CPAP at 5 cm with a resultant AHI of 3.4, a minimum saturation of 90%,  and a mean saturation of 93.2%..    Recommendation:  Consider a trial of CPAP at 5 cm or auto titrating CPAP of 5-12 cm using a mask of choice and heated humidification followed by clinical and data card review in 8 weeks.

## 2017-07-20 NOTE — PATIENT INSTRUCTIONS
1) 1) Start autoCPAP at [id number] with 3L of oxygen  2) Discussed acclimating to machine and change mask within first 30 days if required. Bring machine to first appointment.  3) Start using Anoro again. Continue rescue inhaler Proair or Xopenex as needed  4) Continue daily exercise   5) Vaccines: Up to date with Prevnar and Pneumococcal  6) Continue daytime oxygen at 3L    7) Return in about 6 weeks (around 8/31/2017) for Compliance, review of symptoms, if not sooner, follow up with ALBERTO Valencia.   8) Medrol pack for exacerbation -sent to pharmacy

## 2017-07-20 NOTE — PROGRESS NOTES
"CC:  Here for f/u sleep and pulmonary issues as listed below    HPI:   Abbe presents today for follow up for Asthma and sleep study results. PFTs from 5/2017 have mild declined since 2015 with no change in the TLC and indicate a Fev1 of 1.79L or 46% predicted without bronchodilator response, Fev1/FVC ratio of 97, TLC 54%, DLCO 39%. CAT scan from 12/2015 showed trace loculated pleural effusion and bilateral subpleural bands of sub-segmental atelectasis and/or scarring, which could be related to his restrictive changes found on PFTs per Dr. Cevallos.       Patient was trialed on Anoro, thought beneficial, stopped due to cost. He tried Breo 100, but had frequent thrush. Since last OV the smoke in the air has caused shortness of breath. He stopped using Anoro, because he said in interfered with his lasix. Thus he has been using pro-air 3-4x/day. He is willing to return back to Anoro. He continues to have nasal congestion and cough with white mucus, from allergies; wheezing.  I concur to use nasal steroid spray. He continues to use 24/7 oxygen at 3L with benefit.  He denies hemoptysis, chest pain chest tightness, fevers or chills, acid reflux, morning headaches.    PSG from 5/2017 indicated an AHI of 33.9 and low oxygen of 76%. Titration study completed 7/2017 was trialed on CPAP pressure between 5-10 and did not show a clear endpoint due to low sleep efficiency. It is recommended to start autotherpy, which he is amendable to. He is compliant using 2-1/2 L of oxygen at night.     Patient is currently sleeping 8-9 hours per night with 2-3x nighttime awakenings.  He sleeps every 3 hours max until he urinates. They have no trouble falling asleep.   They typically feel refreshed in the morning, but after he takes off his oxygen he feels \"out of it\".  He then checks his oxygen which has been in the mid 70s. He occasionally morning H/A. They feel tired throughout the day and naps daily for appx 30 min long.  Patient reports " snoring and denies apnea events and paroxysmal nocturnal dyspnea events. They have never fallen asleep in conversation, at the wheel, or at work.  They deny sleepwalking/talking.       Patient Active Problem List    Diagnosis Date Noted   • Chronic systolic congestive heart failure (CMS-HCC) 05/17/2017   • Chronic obstructive pulmonary disease (CMS-HCC) 05/17/2017   • Pulmonary emphysema (CMS-HCC) 04/24/2017   • BRITTANY (obstructive sleep apnea) 04/24/2017   • Mild intermittent asthma without complication 04/19/2016   • Hypokalemia 03/16/2015   • Atypical chest pain 03/16/2015   • Chest pain 03/14/2015   • Pleural effusion 10/29/2013   • GERD (gastroesophageal reflux disease)    • Arrhythmia    • Myocardial infarct    • Dyspnea on exertion 02/19/2013   • Coronary artery disease due to calcified coronary lesion: CABG ×5 in 2009 08/31/2011   • HTN (hypertension), benign 08/31/2011   • Dyslipidemia 08/31/2011       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   • CAD (coronary artery disease) 8/31/2011   • Dyslipidemia 8/31/2011   • Ramsay esophagus    • GERD (gastroesophageal reflux disease)    • Dyspnea on exertion 2/19/2013   • Pleural effusion 7/1/2013   • Hypertension    • HTN (hypertension) 8/31/2011   • HTN (hypertension) 2013     pt states well controlled   • Myocardial infarct (CMS-HCC) 2000   • Arrhythmia      a-fib occas   • Heart burn    • Other specified disorder of intestines 2013     constipation needs miralax daily   • CATARACT 2013   • Cold 10/20     12 hrs of diarrhea    • Indigestion    • Unspecified urinary incontinence    • Atypical chest pain 3/16/2015       Past Surgical History   Procedure Laterality Date   • Cataract extraction       both eyes   • Other       right knee orthoscopic    • Multiple coronary artery bypass endo vein harvest  10/13/2009     Performed by JULIA ALCANTARA at SUR[facility]ERY [facility]   • Maze procedure   10/13/2009     Performed by JULIA ALCANTARA at SURGERY [facility]   • Biopsy general  10/13/2009     Performed by JULIA ALCANTARA at SURGERY [facility]   • Angiogram  2009   • Inguinal hernia repair  9/15/2010     Performed by GRIS GARCÍA at SURGERY [facility]   • Thoracoscopy  10/29/2013     Performed by John H Ganser, M.D. at SURGERY [facility]   • Other cardiac surgery         Family History   Problem Relation Age of Onset   • Other Mother      afib   • Heart Attack Maternal Uncle        Social History     Social History   • Marital Status:      Spouse Name: N/A   • Number of Children: N/A   • Years of Education: N/A     Occupational History   • Not on file.     Social History Main Topics   • Smoking status: Never Smoker    • Smokeless tobacco: Never Used   • Alcohol Use: No   • Drug Use: No   • Sexual Activity: Not on file     Other Topics Concern   • Not on file     Social History Narrative       Current Outpatient Prescriptions   Medication Sig Dispense Refill   • XOPENEX HFA 45 MCG/ACT inhaler   1   • potassium chloride ER (KLOR-CON) 10 MEQ tablet   3   • Cholecalciferol (VITAMIN D) 2000 UNITS Cap Take  by mouth.     • Esomeprazole Magnesium (NEXIUM 24HR PO) Take  by mouth.     • MethylPREDNISolone (MEDROL DOSEPAK) 4 MG Tablet Therapy Pack Take as directed. 21 Tab 0   • furosemide (LASIX) 40 MG Tab Take 1 Tab by mouth every day. 60 Tab 11   • Umeclidinium-Vilanterol (ANORO ELLIPTA) 62.5-25 MCG/INH AEROSOL POWDER, BREATH ACTIVATED Take 1 Inhalation by mouth every day. 1 Each 11   • fluconazole (DIFLUCAN) 100 MG Tab Take 1 Tab by mouth every day. 5 Tab 0   • bisoprolol (ZEBETA) 5 MG Tab Take 1 Tab by mouth every day. 30 Tab 11   • benazepril (LOTENSIN) 20 MG Tab Take 1 Tab by mouth every evening. 90 Tab 3   • rosuvastatin (CRESTOR) 40 MG tablet Take 1 Tab by mouth every day. 90 Tab 3   • aspirin (ASA) 81 MG Chew Tab chewable tablet Take 1 Tab by mouth every day. 90 Tab 3   •  "tamsulosin (FLOMAX) 0.4 MG capsule Take 0.4 mg by mouth 2 Times a Day.     • furosemide (LASIX) 80 MG Tab   3   • sulfamethoxazole-trimethoprim (BACTRIM DS) 800-160 MG tablet   0   • metolazone (ZAROXOLYN) 2.5 MG Tab Take 2.5 mg by mouth every day.     • trazodone (DESYREL) 100 MG Tab Take 1 Tab by mouth at bedtime as needed for Sleep. (Patient not taking: Reported on 7/20/2017) 15 Tab 0   • omeprazole (PRILOSEC) 20 MG delayed-release capsule Take 20 mg by mouth every day.     • zolpidem (AMBIEN) 5 MG Tab Take 1 Tab by mouth at bedtime as needed for Sleep (1 to 3 po qhs prn insomnia/sleep study. Bring to sleep study.). (Patient not taking: Reported on 5/17/2017) 3 Tab 0   • nitroglycerin (NITROSTAT) 0.4 MG SL Tab Place 1 Tab under tongue as needed for Chest Pain. 25 Tab 5   • lansoprazole (PREVACID) 30 MG CPDR Take 30 mg by mouth every day.       Current Facility-Administered Medications   Medication Dose Route Frequency Provider Last Rate Last Dose   • albuterol inhaler 2 Puff  2 Puff Inhalation Q4HRS PRN Julianne Almaraz, A.P.R.N.              Allergies: Prednisone; Ciprofloxacin; Dairy aid; Gluten meal; and Latex      ROS   Gen: Denies fever, chills, unintentional weight loss, fatigue, night sweats  E/N/T: Denies ear pain,   Resp:Denies  hemoptysis  CV: Denies chest pain, chest tightness, palpitations, BLE edema  Sleep:Denies morning headache, insomnia,   Neuro: Denies frequent headaches, weakness, dizziness  GI: Denies N/V, acid reflux/heartburn  See HPI.  All other systems reviewed and negative      Vital signs for this encounter:  Filed Vitals:    07/20/17 1329   Height: 1.93 m (6' 3.98\")   Weight: 97.523 kg (215 lb)   Weight % change since last entry.: 0 %   BP: 122/52   Pulse: 80   BMI (Calculated): 26.18   Resp: 16   Temp: 36.9 °C (98.5 °F)                 Physical Exam:   Appearance: well developed, well nourished, no acute distress.  Eyes: PERRL, EOM intact, sclere white, conjunctiva moist.  Ears: no " lesions or deformities.  Hearing: grossly intact.  Nose: no lesions or deformities.  Dentition: good dentition.  Oropharynx: tongue normal, posterior pharynx without erythema or exudate.  Neck: supple, trachea midline, no masses.  Respiratory effort: no intercostal retractions or use of accessory muscles.  Lung auscultation: Bilateral diminished R>L  Heart auscultation: no murmur, rub, or gallop.   Extremities: no cyanosis or edema.  Abdomen: soft, non-tender, no masses.  Gait and station: grossly normal   Digits and Nails: no clubbing, cyanosis, petechiae, or nodes.  Cranial nerves: grossly normal.  Motor: no focal deficits observed.  Skin: no rashes, lesions, or ulcers noted.  Orientation: oriented to time, place, and person.  Mood and affect: mood and affect appropriate, normal interaction with examiner.    Assessment   1. BRITTANY (obstructive sleep apnea)  DME CPAP    MethylPREDNISolone (MEDROL DOSEPAK) 4 MG Tablet Therapy Pack   2. Chronic obstructive pulmonary disease, unspecified COPD type (CMS-HCC)     3. Pulmonary emphysema, unspecified emphysema type (CMS-HCC)         Patient was seen for 35 minutes, more than 50% of time spent in face to face review, counseling, and arranging future evaluation and follow up of medical conditions and care.     PLAN:   Patient Instructions   1) 1) Start autoCPAP at [id number] with 3L of oxygen  2) Discussed acclimating to machine and change mask within first 30 days if required. Bring machine to first appointment.  3) Start using Anoro again. Continue rescue inhaler    4) Continue daily exercise   5) Vaccines: Up to date with Prevnar and Pneumococcal  6) Continue daytime oxygen at 3L    7) Return in about 6 weeks (around 8/31/2017) for Compliance, review of symptoms, if not sooner, follow up with ALBERTO Valencia.

## 2017-07-20 NOTE — TELEPHONE ENCOUNTER
----- Message from KALE Amos sent at 7/19/2017 12:30 PM PDT -----  Please have patient come in sooner to review results and discuss getting set up on machine.

## 2017-07-20 NOTE — MR AVS SNAPSHOT
"        Abbe Whitman   2017 1:40 PM   Office Visit   MRN: [id number]    Department:  [facility]   Dept Phone:  [phone]    Description:  Male : 1940   Provider:  KALE Amos           Reason for Visit     COPD Last seen 17     Results SS      Allergies as of 2017     Allergen Noted Reactions    Prednisone 2012       High dose caused tightness in throat. Are okay with Medrol pack per patient    Ciprofloxacin 2016       Per patient makes skin peel     Dairy Aid [Lactase] 2017       LACTOSE INTOLERANT    Gluten Meal 2017       BLOATED AND SICK    Latex 10/29/2013       Son says slight skin reaction      You were diagnosed with     BRITTANY (obstructive sleep apnea)   [252179]       Chronic obstructive pulmonary disease, unspecified COPD type (CMS-[facility])   [9132906]       Pulmonary emphysema, unspecified emphysema type (CMS-HCC)   [[id number]]         Vital Signs     Blood Pressure Pulse Temperature Respirations Height Weight    122/52 mmHg 80 36.9 °C (98.5 °F) 16 1.93 m (6' 3.98\") 97.523 kg (215 lb)    Body Mass Index Oxygen Saturation Smoking Status             26.18 kg/m2 93% Never Smoker          Basic Information     Date Of Birth Sex Race Ethnicity Preferred Language    1940 Male White Non- English      Your appointments     Sep 11, 2017  9:40 AM   Established Patient Pul with KALE Amos   [facility] (--)    [address] [facility] [address]  [facility] [id number] [phone]              Problem List              ICD-10-CM Priority Class Noted - Resolved    Coronary artery disease due to calcified coronary lesion: CABG ×5 in  I25.10, I25.84   2011 - Present    HTN (hypertension), benign I10   2011 - Present    Dyslipidemia E78.5   2011 - Present    Dyspnea on exertion R06.09   2013 - Present    GERD (gastroesophageal reflux disease) (Chronic) K21.9   Unknown - Present   " Arrhythmia (Chronic) I49.9   Unknown - Present    Myocardial infarct (Chronic) I21.3   Unknown - Present    Pleural effusion J90   10/29/2013 - Present    Chest pain R07.9   3/14/2015 - Present    Hypokalemia E87.6   3/16/2015 - Present    Atypical chest pain R07.89   3/16/2015 - Present    Mild intermittent asthma without complication J45.20   4/19/2016 - Present    Pulmonary emphysema (CMS-HCC) J43.9   4/24/2017 - Present    BRITTANY (obstructive sleep apnea) G47.33   4/24/2017 - Present    Chronic systolic congestive heart failure (CMS-HCC) I50.22   5/17/2017 - Present    Chronic obstructive pulmonary disease (CMS-HCC) J44.9   5/17/2017 - Present      Health Maintenance        Date Due Completion Dates    IMM DTaP/Tdap/Td Vaccine (1 - Tdap) 6/26/1961 ---    COLONOSCOPY 6/26/1992 ---    IMM ZOSTER VACCINE 6/26/2002 ---    IMM PNEUMOCOCCAL 65+ (ADULT) LOW/MEDIUM RISK SERIES (1 of 2 - PCV13) 6/26/2007 ---    IMM INFLUENZA (1) 9/1/2017 ---            Current Immunizations     No immunizations on file.      Below and/or attached are the medications your provider expects you to take. Review all of your home medications and newly ordered medications with your provider and/or pharmacist. Follow medication instructions as directed by your provider and/or pharmacist. Please keep your medication list with you and share with your provider. Update the information when medications are discontinued, doses are changed, or new medications (including over-the-counter products) are added; and carry medication information at all times in the event of emergency situations     Allergies:  PREDNISONE - (reactions not documented)     CIPROFLOXACIN - (reactions not documented)     DAIRY AID - (reactions not documented)     GLUTEN MEAL - (reactions not documented)     LATEX - (reactions not documented)               Medications  Valid as of: July 20, 2017 -  2:33 PM    Generic Name Brand Name Tablet Size Instructions for use    Aspirin (Chew  Tab) ASA 81 MG Take 1 Tab by mouth every day.        Benazepril HCl (Tab) LOTENSIN 20 MG Take 1 Tab by mouth every evening.        Bisoprolol Fumarate (Tab) ZEBETA 5 MG Take 1 Tab by mouth every day.        Cholecalciferol (Cap) Vitamin D 2000 UNITS Take  by mouth.        Esomeprazole Magnesium   Take  by mouth.        Fluconazole (Tab) DIFLUCAN 100 MG Take 1 Tab by mouth every day.        Furosemide (Tab) LASIX 40 MG Take 1 Tab by mouth every day.        Furosemide (Tab) LASIX 80 MG         Lansoprazole (CAPSULE DELAYED RELEASE) PREVACID 30 MG Take 30 mg by mouth every day.        Levalbuterol Tartrate (Aerosol) XOPENEX HFA 45 MCG/ACT         MethylPREDNISolone (Tablet Therapy Pack) MEDROL DOSEPAK 4 MG Take as directed.        MetOLazone (Tab) ZAROXOLYN 2.5 MG Take 2.5 mg by mouth every day.        Nitroglycerin (SL Tab) NITROSTAT 0.4 MG Place 1 Tab under tongue as needed for Chest Pain.        Omeprazole (CAPSULE DELAYED RELEASE) PRILOSEC 20 MG Take 20 mg by mouth every day.        Potassium Chloride (Tab CR) KLOR-CON 10 MEQ         Rosuvastatin Calcium (Tab) CRESTOR 40 MG Take 1 Tab by mouth every day.        Sulfamethoxazole-Trimethoprim (Tab) BACTRIM -160 MG         Tamsulosin HCl (Cap) FLOMAX 0.4 MG Take 0.4 mg by mouth 2 Times a Day.        TraZODone HCl (Tab) DESYREL 100 MG Take 1 Tab by mouth at bedtime as needed for Sleep.        Umeclidinium-Vilanterol (AEROSOL POWDER, BREATH ACTIVATED) Umeclidinium-Vilanterol 62.5-25 MCG/INH Take 1 Inhalation by mouth every day.        Zolpidem Tartrate (Tab) AMBIEN 5 MG Take 1 Tab by mouth at bedtime as needed for Sleep (1 to 3 po qhs prn insomnia/sleep study. Bring to sleep study.).        .                 Medicines prescribed today were sent to:     [facility] - [facility]NANDE[facility] - [address] [place] [id number]    Phone: [phone] Fax: [phone]    Open 24 Hours?: No      Medication refill instructions:       If your  prescription bottle indicates you have medication refills left, it is not necessary to call your provider’s office. Please contact your pharmacy and they will refill your medication.    If your prescription bottle indicates you do not have any refills left, you may request refills at any time through one of the following ways: The online dotCloud system (except Urgent Care), by calling your provider’s office, or by asking your pharmacy to contact your provider’s office with a refill request. Medication refills are processed only during regular business hours and may not be available until the next business day. Your provider may request additional information or to have a follow-up visit with you prior to refilling your medication.   *Please Note: Medication refills are assigned a new Rx number when refilled electronically. Your pharmacy may indicate that no refills were authorized even though a new prescription for the same medication is available at the pharmacy. Please request the medicine by name with the pharmacy before contacting your provider for a refill.        Instructions    1) 1) Start autoCPAP at [id number] with 3L of oxygen  2) Discussed acclimating to machine and change mask within first 30 days if required. Bring machine to first appointment.  3) Start using Anoro again. Continue rescue inhaler Proair or Xopenex as needed  4) Continue daily exercise   5) Vaccines: Up to date with Prevnar and Pneumococcal  6) Continue daytime oxygen at 3L    7) Return in about 6 weeks (around 8/31/2017) for Compliance, review of symptoms, if not sooner, follow up with ALBERTO Valencia.   8) Medrol pack for exacerbation -sent to pharmacy              dotCloud Access Code: Activation code not generated  Current dotCloud Status: Active

## 2017-08-18 NOTE — TELEPHONE ENCOUNTER
Would also increase humidity and increase water intake during day. May consider Biotene for dry mouth.

## 2017-08-18 NOTE — TELEPHONE ENCOUNTER
1. Caller Name: Abbe                                         Call Back Number: [phone] (home) [phone] (work)        Patient approves a detailed voicemail message: yes and N\A    VM: message stated oxygen questions he is using 2.5 to 3LPM stating he experiences dizziness and has dropped low to 84 on 3LPM.     I spoke to the patient he stated when he is exerting he is experiencing low oxygen on 3 LPM and has increased it to 4 LPM and it went up to 95%. He states he does use his POC when he works outside his home. He stated he is currently on CPAP and states this is his second week using and just needed a recommendation on what to use for they dryness I advised they is a product used for dryness from use of the cpap. recommened Neilmed nasolgel and spray was informed can buy at any pharmacy or store. Patient stated he has an appt 8/30/17 I advised the patient no it was cancelled and moved to 9/11/17 he was unsure to way and was unaware as he was not told. He is okay coming in 9/11/17 for a compliance check advised of location and time and to bring CPAP machine as well.

## 2017-08-22 NOTE — Clinical Note
[facility]   [address], [address]  [place] [address]  Phone: [phone]  Fax: [phone]              Abbe Whitman  1940    Encounter Date: 8/22/2017    Armin Dixon M.D.          PROGRESS NOTE:  Subjective:   Abbe Whitman is a 76 y.o. male who presents today for followup of his coronary artery disease with recurrent pleural effusions, hypertension and hyperlipidemia. He did undergo right pleurodesis. He did develop a left pleural effusion which ultimately caused some chest discomfort and led to an admission at the end of March 2015.    At the end of March 2017, he was hospitalized at [facility]. He had an episode of neck and chest discomfort. A myocardial perfusion scan showed no evidence of ischemia. However, his left ventricular ejection fraction was reduced at about 45%. An echocardiogram also showed a reduced ejection fraction at 40-45%. He also had moderate pulmonary hypertension with an estimate right ventricular systolic pressure of about 55 mmHg.    He continued have difficulty with volume retention. He is followed by Dr. Shea in [place], who is adjusting his diuretic therapy.     He is also seen in pulmonary and has significant underlying lung disease. He apparently does have obstructive sleep apnea in addition to significant hypoxemia. He is now on oxygen therapy 24/7. He has undergone a sleep study and is on CPAP in addition to oxygen at night.    On oxygen therapy, he has dyspnea on exertion at about 30 feet. He's had no PND or orthopnea but is on CPAP therapy with oxygen. He has had difficulty with significant edema which is down very responsive to elevation and compression stockings. He is on diuretic therapy.    He's noted no chest discomfort, palpitations or lightheadedness.    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   • CAD  (coronary artery disease) 8/31/2011   • Dyslipidemia 8/31/2011   • Ramsay esophagus    • GERD (gastroesophageal reflux disease)    • Dyspnea on exertion 2/19/2013   • Pleural effusion 7/1/2013   • Hypertension    • HTN (hypertension) 8/31/2011   • HTN (hypertension) 2013     pt states well controlled   • Myocardial infarct (CMS-HCC) 2000   • Arrhythmia      a-fib occas   • Heart burn    • Other specified disorder of intestines 2013     constipation needs miralax daily   • CATARACT 2013   • Cold 10/20     12 hrs of diarrhea    • Indigestion    • Unspecified urinary incontinence    • Atypical chest pain 3/16/2015     Past Surgical History   Procedure Laterality Date   • Cataract extraction       both eyes   • Other       right knee orthoscopic    • Multiple coronary artery bypass endo vein harvest  10/13/2009     Performed by JULIA ALCANTARA at SURGERY [facility] ORS   • Maze procedure  10/13/2009     Performed by JULIA ALCANTARA at SURGERY [facility] ORS   • Biopsy general  10/13/2009     Performed by JULAI ALCANTARA at SURGERY [facility] ORS   • Angiogram  2009   • Inguinal hernia repair  9/15/2010     Performed by GRIS GARCÍA at SURGERY [facility] ORS   • Thoracoscopy  10/29/2013     Performed by John H Ganser, M.D. at SURGERY [facility] ORS   • Other cardiac surgery       Family History   Problem Relation Age of Onset   • Other Mother      afib   • Heart Attack Maternal Uncle      History   Smoking status   • Never Smoker    Smokeless tobacco   • Never Used     Allergies   Allergen Reactions   • Prednisone      High dose caused tightness in throat. Are okay with Medrol pack per patient   • Ciprofloxacin      Per patient makes skin peel    • Dairy Aid [Lactase]      LACTOSE INTOLERANT   • Gluten Meal      BLOATED AND SICK   • Latex      Son says slight skin reaction     Outpatient Encounter Prescriptions as of 8/22/2017   Medication Sig Dispense Refill   • XOPENEX HFA 45 MCG/ACT inhaler   1   • potassium  chloride ER (KLOR-CON) 10 MEQ tablet   3   • Cholecalciferol (VITAMIN D) 2000 UNITS Cap Take  by mouth.     • Esomeprazole Magnesium (NEXIUM 24HR PO) Take  by mouth.     • furosemide (LASIX) 40 MG Tab Take 1 Tab by mouth every day. (Patient taking differently: Take 160 mg by mouth every day.) 60 Tab 11   • metolazone (ZAROXOLYN) 2.5 MG Tab Take 2.5 mg by mouth every day.     • Umeclidinium-Vilanterol (ANORO ELLIPTA) 62.5-25 MCG/INH AEROSOL POWDER, BREATH ACTIVATED Take 1 Inhalation by mouth every day. 1 Each 11   • omeprazole (PRILOSEC) 20 MG delayed-release capsule Take 20 mg by mouth every day.     • bisoprolol (ZEBETA) 5 MG Tab Take 1 Tab by mouth every day. 30 Tab 11   • benazepril (LOTENSIN) 20 MG Tab Take 1 Tab by mouth every evening. 90 Tab 3   • rosuvastatin (CRESTOR) 40 MG tablet Take 1 Tab by mouth every day. 90 Tab 3   • nitroglycerin (NITROSTAT) 0.4 MG SL Tab Place 1 Tab under tongue as needed for Chest Pain. 25 Tab 5   • aspirin (ASA) 81 MG Chew Tab chewable tablet Take 1 Tab by mouth every day. 90 Tab 3   • tamsulosin (FLOMAX) 0.4 MG capsule Take 0.4 mg by mouth 2 Times a Day.     • furosemide (LASIX) 80 MG Tab   3   • sulfamethoxazole-trimethoprim (BACTRIM DS) 800-160 MG tablet   0   • MethylPREDNISolone (MEDROL DOSEPAK) 4 MG Tablet Therapy Pack Take as directed. (Patient not taking: Reported on 8/22/2017) 21 Tab 0   • trazodone (DESYREL) 100 MG Tab Take 1 Tab by mouth at bedtime as needed for Sleep. (Patient not taking: Reported on 7/20/2017) 15 Tab 0   • fluconazole (DIFLUCAN) 100 MG Tab Take 1 Tab by mouth every day. (Patient not taking: Reported on 8/22/2017) 5 Tab 0   • zolpidem (AMBIEN) 5 MG Tab Take 1 Tab by mouth at bedtime as needed for Sleep (1 to 3 po qhs prn insomnia/sleep study. Bring to sleep study.). (Patient not taking: Reported on 5/17/2017) 3 Tab 0   • lansoprazole (PREVACID) 30 MG CPDR Take 30 mg by mouth every day.       Facility-Administered Encounter Medications as of 8/22/2017    "  Medication Dose Route Frequency Provider Last Rate Last Dose   • albuterol inhaler 2 Puff  2 Puff Inhalation Q4HRS KALE Jacinto       Objective:   /60 mmHg  Pulse 74  Ht 1.905 m (6' 3\")  SpO2 95%    Physical Exam   Neck: JVD present.   Cardiovascular: Normal rate and regular rhythm.  Exam reveals no gallop.    No murmur heard.  Pulmonary/Chest: Effort normal. He has no rales.   Abdominal: Soft. There is no tenderness.   Musculoskeletal: He exhibits edema (3+ pretibial).     Myocardial perfusion scan:  IMPRESSIONS  Normal left ventricular size, ejection fraction, and wall motion.  No evidence of significant jeopardized viable myocardium or prior myocardial   Infarction.  Normal left ventricular wall motion. LV ejection fraction = 53%.  Exam Date: 03/15/2015 10:37    Echo:  CONCLUSIONS  Technically difficult study.   Mildly reduced left ventricular systolic function.  Left ventricular ejection fraction is 45% to 50%.  Mild mitral regurgitation.  Moderate tricuspid regurgitation.  Right ventricular systolic pressure is estimated to be 40 to 45 mmHg.  Exam Date: 03/15/2015     Laboratory from August 22: Laboratory from February 23: Sodium 140, potassium 3.2, bilirubin 1.8. BUN 18 and creatinine 1.3.    Laboratory from December 2016: Total cholesterol 89, triglycerides 28, HDL 36 and LDL 47    Assessment:     1. Coronary artery disease due to calcified coronary lesion: CABG ×5 in 2009     2. HTN (hypertension), benign     3. Dyslipidemia     4. Dyspnea on exertion     5. BRITTANY (obstructive sleep apnea)         Medical Decision Making:  Today's Assessment / Status / Plan:     Mr. Whitman is having difficulty with congestive heart failure and his underlying pulmonary disease. He had no change in his LV function on echocardiography in March. His ejection fraction was about 45% at that time. Dr. Shea has increased his diuretic therapy with the addition of metolazone. He was on this " previously but has been off of it. Dr. Shea is following his volume status closely. I feel that Mr. Whitman can follow-up in about 6 months.    Please FAX copy of note to Dr. Raza Shea in [place].        Skyla Foy M.D.  [address] [place] [id number]  VIA Facsimile: [phone]

## 2017-08-22 NOTE — PROGRESS NOTES
Subjective:   Abbe Whitman is a 76 y.o. male who presents today for followup of his coronary artery disease with recurrent pleural effusions, hypertension and hyperlipidemia. He did undergo right pleurodesis. He did develop a left pleural effusion which ultimately caused some chest discomfort and led to an admission at the end of March 2015.    At the end of March 2017, he was hospitalized at [facility]. He had an episode of neck and chest discomfort. A myocardial perfusion scan showed no evidence of ischemia. However, his left ventricular ejection fraction was reduced at about 45%. An echocardiogram also showed a reduced ejection fraction at 40-45%. He also had moderate pulmonary hypertension with an estimate right ventricular systolic pressure of about 55 mmHg.    He continued have difficulty with volume retention. He is followed by Dr. Shabbir chan [place], who is adjusting his diuretic therapy.     He is also seen in pulmonary and has significant underlying lung disease. He apparently does have obstructive sleep apnea in addition to significant hypoxemia. He is now on oxygen therapy 24/7. He has undergone a sleep study and is on CPAP in addition to oxygen at night.    On oxygen therapy, he has dyspnea on exertion at about 30 feet. He's had no PND or orthopnea but is on CPAP therapy with oxygen. He has had difficulty with significant edema which is down very responsive to elevation and compression stockings. He is on diuretic therapy.    He's noted no chest discomfort, palpitations or lightheadedness.    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   • CAD (coronary artery disease) 8/31/2011   • Dyslipidemia 8/31/2011   • Ramsay esophagus    • GERD (gastroesophageal reflux disease)    • Dyspnea on exertion 2/19/2013   • Pleural effusion 7/1/2013   • Hypertension    • HTN (hypertension) 8/31/2011   • HTN (hypertension)  2013     pt states well controlled   • Myocardial infarct (CMS-HCC) 2000   • Arrhythmia      a-fib occas   • Heart burn    • Other specified disorder of intestines 2013     constipation needs miralax daily   • CATARACT 2013   • Cold 10/20     12 hrs of diarrhea    • Indigestion    • Unspecified urinary incontinence    • Atypical chest pain 3/16/2015     Past Surgical History   Procedure Laterality Date   • Cataract extraction       both eyes   • Other       right knee orthoscopic    • Multiple coronary artery bypass endo vein harvest  10/13/2009     Performed by JULAI ALCANTARA at SURGERY [facility] ORS   • Maze procedure  10/13/2009     Performed by JULIA ALCANTARA at SURGERY [facility] ORS   • Biopsy general  10/13/2009     Performed by JULIA ALCANTARA at SURGERY [facility] ORS   • Angiogram  2009   • Inguinal hernia repair  9/15/2010     Performed by GRIS GARCÍA at SURGERY [facility] ORS   • Thoracoscopy  10/29/2013     Performed by John H Ganser, M.D. at SURGERY [facility] ORS   • Other cardiac surgery       Family History   Problem Relation Age of Onset   • Other Mother      afib   • Heart Attack Maternal Uncle      History   Smoking status   • Never Smoker    Smokeless tobacco   • Never Used     Allergies   Allergen Reactions   • Prednisone      High dose caused tightness in throat. Are okay with Medrol pack per patient   • Ciprofloxacin      Per patient makes skin peel    • Dairy Aid [Lactase]      LACTOSE INTOLERANT   • Gluten Meal      BLOATED AND SICK   • Latex      Son says slight skin reaction     Outpatient Encounter Prescriptions as of 8/22/2017   Medication Sig Dispense Refill   • XOPENEX HFA 45 MCG/ACT inhaler   1   • potassium chloride ER (KLOR-CON) 10 MEQ tablet   3   • Cholecalciferol (VITAMIN D) 2000 UNITS Cap Take  by mouth.     • Esomeprazole Magnesium (NEXIUM 24HR PO) Take  by mouth.     • furosemide (LASIX) 40 MG Tab Take 1 Tab by mouth every day. (Patient taking differently: Take 160  "mg by mouth every day.) 60 Tab 11   • metolazone (ZAROXOLYN) 2.5 MG Tab Take 2.5 mg by mouth every day.     • Umeclidinium-Vilanterol (ANORO ELLIPTA) 62.5-25 MCG/INH AEROSOL POWDER, BREATH ACTIVATED Take 1 Inhalation by mouth every day. 1 Each 11   • omeprazole (PRILOSEC) 20 MG delayed-release capsule Take 20 mg by mouth every day.     • bisoprolol (ZEBETA) 5 MG Tab Take 1 Tab by mouth every day. 30 Tab 11   • benazepril (LOTENSIN) 20 MG Tab Take 1 Tab by mouth every evening. 90 Tab 3   • rosuvastatin (CRESTOR) 40 MG tablet Take 1 Tab by mouth every day. 90 Tab 3   • nitroglycerin (NITROSTAT) 0.4 MG SL Tab Place 1 Tab under tongue as needed for Chest Pain. 25 Tab 5   • aspirin (ASA) 81 MG Chew Tab chewable tablet Take 1 Tab by mouth every day. 90 Tab 3   • tamsulosin (FLOMAX) 0.4 MG capsule Take 0.4 mg by mouth 2 Times a Day.     • furosemide (LASIX) 80 MG Tab   3   • sulfamethoxazole-trimethoprim (BACTRIM DS) 800-160 MG tablet   0   • MethylPREDNISolone (MEDROL DOSEPAK) 4 MG Tablet Therapy Pack Take as directed. (Patient not taking: Reported on 8/22/2017) 21 Tab 0   • trazodone (DESYREL) 100 MG Tab Take 1 Tab by mouth at bedtime as needed for Sleep. (Patient not taking: Reported on 7/20/2017) 15 Tab 0   • fluconazole (DIFLUCAN) 100 MG Tab Take 1 Tab by mouth every day. (Patient not taking: Reported on 8/22/2017) 5 Tab 0   • zolpidem (AMBIEN) 5 MG Tab Take 1 Tab by mouth at bedtime as needed for Sleep (1 to 3 po qhs prn insomnia/sleep study. Bring to sleep study.). (Patient not taking: Reported on 5/17/2017) 3 Tab 0   • lansoprazole (PREVACID) 30 MG CPDR Take 30 mg by mouth every day.       Facility-Administered Encounter Medications as of 8/22/2017   Medication Dose Route Frequency Provider Last Rate Last Dose   • albuterol inhaler 2 Puff  2 Puff Inhalation Q4HRS PRN ETHEL Amos.P.R.N.         ROS       Objective:   /60 mmHg  Pulse 74  Ht 1.905 m (6' 3\")  SpO2 95%    Physical Exam   Neck: JVD " present.   Cardiovascular: Normal rate and regular rhythm.  Exam reveals no gallop.    No murmur heard.  Pulmonary/Chest: Effort normal. He has no rales.   Abdominal: Soft. There is no tenderness.   Musculoskeletal: He exhibits edema (3+ pretibial).     Myocardial perfusion scan:  IMPRESSIONS  Normal left ventricular size, ejection fraction, and wall motion.  No evidence of significant jeopardized viable myocardium or prior myocardial   Infarction.  Normal left ventricular wall motion. LV ejection fraction = 53%.  Exam Date: 03/15/2015 10:37    Echo:  CONCLUSIONS  Technically difficult study.   Mildly reduced left ventricular systolic function.  Left ventricular ejection fraction is 45% to 50%.  Mild mitral regurgitation.  Moderate tricuspid regurgitation.  Right ventricular systolic pressure is estimated to be 40 to 45 mmHg.  Exam Date: 03/15/2015     Laboratory from August 22: Laboratory from February 23: Sodium 140, potassium 3.2, bilirubin 1.8. BUN 18 and creatinine 1.3.    Laboratory from December 2016: Total cholesterol 89, triglycerides 28, HDL 36 and LDL 47    Assessment:     1. Coronary artery disease due to calcified coronary lesion: CABG ×5 in 2009     2. HTN (hypertension), benign     3. Dyslipidemia     4. Dyspnea on exertion     5. BRITTANY (obstructive sleep apnea)         Medical Decision Making:  Today's Assessment / Status / Plan:     Mr. Whitman is having difficulty with congestive heart failure and his underlying pulmonary disease. He had no change in his LV function on echocardiography in March. His ejection fraction was about 45% at that time. Dr. Shea has increased his diuretic therapy with the addition of metolazone. He was on this previously but has been off of it. Dr. Shea is following his volume status closely. I feel that Mr. Whitman can follow-up in about 6 months.    Please FAX copy of note to Dr. Raza Shea in [place].

## 2017-08-25 NOTE — PROGRESS NOTES
"Evaluation of 6MWT/MLWHF Questionnaire    Date: 17  6MWT: none on file   MLWHF: none on file    Date: 2017  6MWT: refused-states too dizzy and lightheaded  MLWHF: 48    OP Heart Failure  Vitals  Appointment Type: Heart Failure Established  Height: 190.5 cm (6' 3\")  Blood Pressure : 104/60 mmHg  Pulse: 74    System Assessment  NYHA Functional Class Assessment:  (no stage and class)     Smoking Hx  Have you Ever Smoked: Never     Alcohol Hx  Do you Drink?: No     Illicit Drug Hx  Illicit Drug History: No    MN Living with Heart Failure  Swelling in Ankles or Legs: 5  Having to Sit or Lie Down During the Day: 3  Walking About or Climbing Stairs Difficult: 4  Working Around the House or Yard Difficult: 4  Difficulty Going Away from Home: 4  Difficulty Sleeping Well at Night: 4  Difficulty Socializing with Family or Friends: 2  Difficulty Working to Earn a Livin  Difficulty with Recreational Pastimes, Sports, Hobbies: 0  Difficulty with Sexual Activities: 0  Eating Less Foods You Like: 0  Making you Short of Breath: 4  Tired, Fatigued or Low on Energy: 4  Making you Stay in a Hospital: 0  Costing you Money for Medical Care?: 3  Giving you Side Effects from Treatments: 3  Feeling like a Dix to Family and Friends: 1  Loss of Self Control in your Life: 2  Making You Worry: 2  Difficulty to Concentrate or Remembering Things: 3  Making you Feel Depressed: 0  MLF Total Score : 48    6 Minute Walk Test  Baseline to end of test:  (refused-due to dizzy/lightheadedness)          CLARE Crowder RN  [phone]  "

## 2017-09-11 PROBLEM — J96.11 CHRONIC RESPIRATORY FAILURE WITH HYPOXIA (HCC): Status: ACTIVE | Noted: 2017-01-01

## 2017-09-11 NOTE — PROGRESS NOTES
CC:  Here for f/u sleep and pulmonary issues as listed below    HPI:   Abbe presents today for follow up for Asthma and BRITTANY. PFTs from 5/2017 have mild declined since 2015 with no change in the TLC and indicate a Fev1 of 1.79L or 46% predicted without bronchodilator response, Fev1/FVC ratio of 97, TLC 54%, DLCO 39%. CAT scan from 12/2015 showed trace loculated pleural effusion and bilateral subpleural bands of sub-segmental atelectasis and/or scarring, which could be related to his restrictive changes found on PFTs per Dr. Cevallos.        Patient was trialed on Anoro, thought beneficial, stopped due to cost and interaction with lasix. He tried Breo 100, but had frequent thrush. Restarted Anoro with benefit and now uses Xopenex 3-4x/week.  He continues to have nasal congestion and cough with white mucus, from allergies; wheezing.  I concur to use nasal steroid spray. He continues to use 24/7 oxygen at 3L with benefit.  He denies hemoptysis, chest pain chest tightness, fevers or chills, acid reflux, morning headaches.     PSG from 5/2017 indicated an AHI of 33.9 and low oxygen of 76%. Titration study completed 7/2017.  Currently he is being treated with autoCPAP @ [id number].  Compliance download from the dates 8/12/2017 - 9/10/2017 indicates he is wearing the device 100% for an avg of 6 hours and 17 minutes per night with a reduced AHI of 2.2. Avg pressure is 11.7 with a high of 11.9.  He does tolerate pressure and mask well.  He wakes up refreshed and is less tired throughout the day. He continues to take a nap a few times improved since CPAP machine. He denies morning H/A and sleep better overall. He continues to wake in the middle of the night d/t nocturia and only sleeps appx 3 hours before he needs to wake. He will continue to clean supplies weekly and change them as insurance allows.     Patient Active Problem List    Diagnosis Date Noted   • Chronic respiratory failure with hypoxia (CMS-HCC) 09/11/2017   •  Chronic systolic congestive heart failure (CMS-HCC) 05/17/2017   • Chronic obstructive pulmonary disease (CMS-HCC) 05/17/2017   • Pulmonary emphysema (CMS-HCC) 04/24/2017   • BRITTANY (obstructive sleep apnea) 04/24/2017   • Mild intermittent asthma without complication 04/19/2016   • Hypokalemia 03/16/2015   • Atypical chest pain 03/16/2015   • Chest pain 03/14/2015   • Pleural effusion 10/29/2013   • GERD (gastroesophageal reflux disease)    • Arrhythmia    • Myocardial infarct    • Dyspnea on exertion 02/19/2013   • Coronary artery disease due to calcified coronary lesion: CABG ×5 in 2009 08/31/2011   • HTN (hypertension), benign 08/31/2011   • Dyslipidemia 08/31/2011       Past Medical History:   Diagnosis Date   • Atypical chest pain 3/16/2015   • Pleural effusion 7/1/2013   • Dyspnea on exertion 2/19/2013   • HTN (hypertension) 2013    pt states well controlled   • Other specified disorder of intestines 2013    constipation needs miralax daily   • CATARACT 2013   • CAD (coronary artery disease) 8/31/2011   • Dyslipidemia 8/31/2011   • HTN (hypertension) 8/31/2011   • Dressler's syndrome (CMS-HCC) 2009    happened after cabg   • Myocardial infarct (CMS-HCC) 2000   • Angina    • Arrhythmia     a-fib occas   • Ramsay esophagus    • Cold 10/20    12 hrs of diarrhea    • GERD (gastroesophageal reflux disease)    • Heart burn    • Hypertension    • Indigestion    • Unspecified urinary incontinence        Past Surgical History:   Procedure Laterality Date   • THORACOSCOPY  10/29/2013    Performed by John H Ganser, M.D. at [facility]   • INGUINAL HERNIA REPAIR  9/15/2010    Performed by GRIS GARCÍA at [facility]   • MULTIPLE CORONARY ARTERY BYPASS ENDO VEIN HARVEST  10/13/2009    Performed by JULIA ALCANTARA at [facility]   • MAZE PROCEDURE  10/13/2009    Performed by JULIA ALCANTARA at [facility]   • BIOPSY GENERAL  10/13/2009    Performed by JULIA ALCANTARA at  SURGERY [facility]   • ANGIOGRAM  2009   • CATARACT EXTRACTION      both eyes   • OTHER      right knee orthoscopic    • OTHER CARDIAC SURGERY         Family History   Problem Relation Age of Onset   • Other Mother      afib   • Heart Attack Maternal Uncle        Social History     Social History   • Marital status:      Spouse name: N/A   • Number of children: N/A   • Years of education: N/A     Occupational History   • Not on file.     Social History Main Topics   • Smoking status: Never Smoker   • Smokeless tobacco: Never Used   • Alcohol use No   • Drug use: No   • Sexual activity: Not on file     Other Topics Concern   • Not on file     Social History Narrative   • No narrative on file       Current Outpatient Prescriptions   Medication Sig Dispense Refill   • XOPENEX HFA 45 MCG/ACT inhaler   1   • potassium chloride ER (KLOR-CON) 10 MEQ tablet   3   • Cholecalciferol (VITAMIN D) 2000 UNITS Cap Take  by mouth.     • Esomeprazole Magnesium (NEXIUM 24HR PO) Take  by mouth.     • metolazone (ZAROXOLYN) 2.5 MG Tab Take 2.5 mg by mouth every day.     • Umeclidinium-Vilanterol (ANORO ELLIPTA) 62.5-25 MCG/INH AEROSOL POWDER, BREATH ACTIVATED Take 1 Inhalation by mouth every day. 1 Each 11   • omeprazole (PRILOSEC) 20 MG delayed-release capsule Take 20 mg by mouth every day.     • fluconazole (DIFLUCAN) 100 MG Tab Take 1 Tab by mouth every day. 5 Tab 0   • bisoprolol (ZEBETA) 5 MG Tab Take 1 Tab by mouth every day. 30 Tab 11   • benazepril (LOTENSIN) 20 MG Tab Take 1 Tab by mouth every evening. 90 Tab 3   • rosuvastatin (CRESTOR) 40 MG tablet Take 1 Tab by mouth every day. 90 Tab 3   • nitroglycerin (NITROSTAT) 0.4 MG SL Tab Place 1 Tab under tongue as needed for Chest Pain. 25 Tab 5   • aspirin (ASA) 81 MG Chew Tab chewable tablet Take 1 Tab by mouth every day. 90 Tab 3   • tamsulosin (FLOMAX) 0.4 MG capsule Take 0.4 mg by mouth 2 Times a Day.     • furosemide (LASIX) 80 MG Tab   3   •  "sulfamethoxazole-trimethoprim (BACTRIM DS) 800-160 MG tablet   0   • lansoprazole (PREVACID) 30 MG CPDR Take 30 mg by mouth every day.       Current Facility-Administered Medications   Medication Dose Route Frequency Provider Last Rate Last Dose   • albuterol inhaler 2 Puff  2 Puff Inhalation Q4HRS PRN KORIN AmosP.RNeryNNery              Allergies: Prednisone; Ciprofloxacin; Dairy aid [lactase]; Gluten meal; and Latex      ROS   Gen: Denies fever, chills, unintentional weight loss, fatigue, night sweats  E/N/T: Denies ear pain, nasal congestion  Resp:Denies Dyspnea, wheezing, cough, sputum production, hemoptysis  CV: Denies chest pain, chest tightness, palpitations, BLE edema  Sleep:Denies morning headache, insomnia, daytime somnolence, snoring, gasping for air, apnea  Neuro: Denies frequent headaches, weakness, dizziness  GI: Denies N/V, acid reflux/heartburn  See HPI.  All other systems reviewed and negative      Vital signs for this encounter:  Vitals:    09/11/17 0942   Height: 1.905 m (6' 3\")   Weight: 98 kg (216 lb)   Weight % change since last entry.: 0 %   BP: 110/64   Pulse: 73   BMI (Calculated): 27   Resp: 16   Temp: 37 °C (98.6 °F)   O2 sat % on O2: 95 %   O2 Flow Rate (L/min): 3                 Physical Exam:   Appearance: well developed, well nourished, no acute distress.  Eyes: PERRL, EOM intact, sclere white, conjunctiva moist.  Ears: no lesions or deformities.  Hearing: grossly intact.  Nose: no lesions or deformities.  Dentition: good dentition.  Oropharynx: tongue normal, posterior pharynx without erythema or exudate.  Neck: supple, trachea midline, no masses.  Respiratory effort: no intercostal retractions or use of accessory muscles.  Lung auscultation: Bilateral diminished   Heart auscultation: no murmur, rub, or gallop.   Extremities: no cyanosis or edema.  Abdomen: soft, non-tender, no masses.  Gait and station: grossly normal   Digits and Nails: no clubbing, cyanosis, petechiae, or " nodes.  Cranial nerves: grossly normal.  Motor: no focal deficits observed.  Skin: no rashes, lesions, or ulcers noted.  Orientation: oriented to time, place, and person.  Mood and affect: mood and affect appropriate, normal interaction with examiner.    Assessment   1. Mild intermittent asthma without complication     2. BRITTANY (obstructive sleep apnea)  DME OTHER   3. Chronic respiratory failure with hypoxia (CMS-HCC)         Patient was seen for 35 minutes, more than 50% of time spent in face to face review, counseling, and arranging future evaluation and follow up of medical conditions and care.     PLAN:   Patient Instructions   1) Continue autoCPAP at [id number] with 3L of oxygen  2) Clean mask and supplies weekly and change them as insurance allows  3) Continue Anoro. Continue Xopenex rescue inhaler    4) Continue daily exercise   5) Vaccines: Up to date with Prevnar and Pneumococcal  6) Continue daytime oxygen at 3L    7) Return in about 3 months (around 12/11/2017) for Compliance, review of symptoms, if not sooner, follow up with ALBERTO Valencia.

## 2017-09-11 NOTE — PATIENT INSTRUCTIONS
1) Continue autoCPAP and change pressure [id number] with 3L of oxygen  2) Clean mask and supplies weekly and change them as insurance allows  3) Continue using Anoro. Continue Xopenex rescue inhaler    4) Continue daily exercise   5) Vaccines: Up to date with Prevnar and Pneumococcal  6) Continue daytime oxygen at 3L    7) Return in about 3 months (around 12/11/2017) for Compliance, review of symptoms, if not sooner, follow up with ALBERTO Valencia.

## 2017-11-27 NOTE — TELEPHONE ENCOUNTER
The patient's wife, sharon called and requested a DMV Placard form for the patient to be filled out.  I told her that I would fill one out for approval and call her when it is approved.  She also wants to order an [employer] POC for Abbe so I transferred her to [facility] for that.

## 2017-11-28 ENCOUNTER — APPOINTMENT (RX ONLY)
Dept: URBAN - NONMETROPOLITAN AREA CLINIC 15 | Facility: CLINIC | Age: 77
Setting detail: DERMATOLOGY
End: 2017-11-28

## 2017-11-28 DIAGNOSIS — L82.1 OTHER SEBORRHEIC KERATOSIS: ICD-10-CM

## 2017-11-28 DIAGNOSIS — L57.0 ACTINIC KERATOSIS: ICD-10-CM

## 2017-11-28 DIAGNOSIS — L81.4 OTHER MELANIN HYPERPIGMENTATION: ICD-10-CM

## 2017-11-28 PROBLEM — D48.5 NEOPLASM OF UNCERTAIN BEHAVIOR OF SKIN: Status: ACTIVE | Noted: 2017-11-28

## 2017-11-28 PROCEDURE — 11100: CPT | Mod: 59

## 2017-11-28 PROCEDURE — 99212 OFFICE O/P EST SF 10 MIN: CPT | Mod: 25

## 2017-11-28 PROCEDURE — ? LIQUID NITROGEN

## 2017-11-28 PROCEDURE — 17000 DESTRUCT PREMALG LESION: CPT

## 2017-11-28 PROCEDURE — ? COUNSELING

## 2017-11-28 PROCEDURE — ? BIOPSY BY SHAVE METHOD

## 2017-11-28 PROCEDURE — 17003 DESTRUCT PREMALG LES 2-14: CPT

## 2017-11-28 ASSESSMENT — LOCATION SIMPLE DESCRIPTION DERM
LOCATION SIMPLE: LEFT CHEEK
LOCATION SIMPLE: RIGHT EAR
LOCATION SIMPLE: RIGHT CHEEK

## 2017-11-28 ASSESSMENT — LOCATION DETAILED DESCRIPTION DERM
LOCATION DETAILED: LEFT CENTRAL MALAR CHEEK
LOCATION DETAILED: RIGHT LATERAL MALAR CHEEK
LOCATION DETAILED: RIGHT SUPERIOR LATERAL BUCCAL CHEEK
LOCATION DETAILED: RIGHT INFERIOR LATERAL MALAR CHEEK
LOCATION DETAILED: RIGHT SUPERIOR CRUS OF ANTIHELIX

## 2017-11-28 ASSESSMENT — LOCATION ZONE DERM
LOCATION ZONE: FACE
LOCATION ZONE: EAR

## 2017-11-28 NOTE — PROCEDURE: LIQUID NITROGEN
Medical Necessity Information: It is in your best interest to select a reason for this procedure from the list below. All of these items fulfill various CMS LCD requirements except the new and changing color options.
Medical Necessity Clause: This procedure was medically necessary because the lesions that were treated were:
Include Z78.9 (Other Specified Conditions Influencing Health Status) As An Associated Diagnosis?: No
Total Number Of Lesions Treated: 5
Post-Care Instructions: I reviewed with the patient in detail post-care instructions. Patient is to wear sunprotection, and avoid picking at any of the treated lesions. Pt may apply Vaseline to crusted or scabbing areas.
Consent: The patient's consent was obtained including but not limited to risks of crusting, scabbing, blistering, scarring, darker or lighter pigmentary change, recurrence, incomplete removal and infection.
Detail Level: Zone
Detail Level: Detailed
Duration Of Freeze Thaw-Cycle (Seconds): 0

## 2017-11-28 NOTE — HPI: SKIN LESION
Is This A New Presentation, Or A Follow-Up?: Skin Lesions
Has Your Skin Lesion Been Treated?: been treated

## 2017-11-28 NOTE — PROCEDURE: BIOPSY BY SHAVE METHOD
Dressing: bandage
Size Of Lesion In Cm: 0.6
Silver Nitrate Text: The wound bed was treated with silver nitrate after the biopsy was performed.
Bill For Surgical Tray: no
X Size Of Lesion In Cm: 0
Anesthesia Type: 1% lidocaine with epinephrine
Lab Facility: 
Hemostasis: Electrocautery
Anesthesia Volume In Cc: 0.5
Cryotherapy Text: The wound bed was treated with cryotherapy after the biopsy was performed.
Detail Level: Detailed
Electrodesiccation Text: The wound bed was treated with electrodesiccation after the biopsy was performed.
Biopsy Method: Dermablade
Lab: [id number]
Consent: Written consent was obtained and risks were reviewed including but not limited to scarring, infection, bleeding, scabbing, incomplete removal, nerve damage and allergy to anesthesia.
Biopsy Type: H and E
Wound Care: Aquaphor
Notification Instructions: Patient will be notified of biopsy results. However, patient instructed to call the office if not contacted within 2 weeks.
Electrodesiccation And Curettage Text: The wound bed was treated with electrodesiccation and curettage after the biopsy was performed.
Type Of Destruction Used: Electrodesiccation
Billing Type: Third-Party Bill
Curettage Text: The wound bed was treated with curettage after the biopsy was performed.
Post-Care Instructions: I reviewed with the patient in detail post-care instructions. Patient is to keep the biopsy site dry overnight, and then apply bacitracin twice daily until healed. Patient may apply hydrogen peroxide soaks to remove any crusting.

## 2017-12-01 NOTE — TELEPHONE ENCOUNTER
I verified with [facility] that if he wants a POC through them he will need to qualify with pulse dose and that [facility] does not carry Inogens at all    Talked to patient and went over the above information.  He was very nice and understands that he can not get the continuous flow POC he wants unless he purchases it himself.  He will stay with [facility] for now because they provide the back up tanks he likes to have

## 2018-01-01 ENCOUNTER — OFFICE VISIT (OUTPATIENT)
Dept: CARDIOLOGY | Facility: MEDICAL CENTER | Age: 78
End: 2018-01-01
Payer: COMMERCIAL

## 2018-01-01 ENCOUNTER — HOSPITAL ENCOUNTER (OUTPATIENT)
Dept: RADIOLOGY | Facility: MEDICAL CENTER | Age: 78
End: 2018-01-11
Attending: NURSE PRACTITIONER
Payer: MEDICARE

## 2018-01-01 ENCOUNTER — TELEPHONE (OUTPATIENT)
Dept: CARDIOLOGY | Facility: MEDICAL CENTER | Age: 78
End: 2018-01-01

## 2018-01-01 ENCOUNTER — TELEPHONE (OUTPATIENT)
Dept: PULMONOLOGY | Facility: HOSPICE | Age: 78
End: 2018-01-01

## 2018-01-01 ENCOUNTER — APPOINTMENT (OUTPATIENT)
Dept: RADIOLOGY | Facility: MEDICAL CENTER | Age: 78
DRG: 871 | End: 2018-01-01
Attending: HOSPITALIST
Payer: MEDICARE

## 2018-01-01 ENCOUNTER — OFFICE VISIT (OUTPATIENT)
Dept: PULMONOLOGY | Facility: HOSPICE | Age: 78
End: 2018-01-01
Payer: MEDICARE

## 2018-01-01 ENCOUNTER — HOSPITAL ENCOUNTER (OUTPATIENT)
Dept: RADIOLOGY | Facility: MEDICAL CENTER | Age: 78
End: 2018-05-09
Attending: NURSE PRACTITIONER
Payer: MEDICARE

## 2018-01-01 ENCOUNTER — HOSPITAL ENCOUNTER (INPATIENT)
Facility: MEDICAL CENTER | Age: 78
LOS: 1 days | DRG: 871 | End: 2018-06-29
Attending: EMERGENCY MEDICINE | Admitting: FAMILY MEDICINE
Payer: MEDICARE

## 2018-01-01 ENCOUNTER — HOSPITAL ENCOUNTER (OUTPATIENT)
Dept: CARDIOLOGY | Facility: MEDICAL CENTER | Age: 78
End: 2018-04-12
Attending: INTERNAL MEDICINE
Payer: MEDICARE

## 2018-01-01 ENCOUNTER — APPOINTMENT (OUTPATIENT)
Dept: RADIOLOGY | Facility: MEDICAL CENTER | Age: 78
DRG: 871 | End: 2018-01-01
Attending: INTERNAL MEDICINE
Payer: MEDICARE

## 2018-01-01 VITALS
DIASTOLIC BLOOD PRESSURE: 50 MMHG | OXYGEN SATURATION: 93 % | WEIGHT: 219 LBS | BODY MASS INDEX: 26.67 KG/M2 | SYSTOLIC BLOOD PRESSURE: 120 MMHG | HEART RATE: 80 BPM | HEIGHT: 76 IN

## 2018-01-01 VITALS
HEART RATE: 74 BPM | HEIGHT: 76 IN | OXYGEN SATURATION: 92 % | BODY MASS INDEX: 26.18 KG/M2 | DIASTOLIC BLOOD PRESSURE: 60 MMHG | SYSTOLIC BLOOD PRESSURE: 110 MMHG | WEIGHT: 215 LBS

## 2018-01-01 VITALS
OXYGEN SATURATION: 100 % | BODY MASS INDEX: 25.57 KG/M2 | WEIGHT: 210 LBS | TEMPERATURE: 97.7 F | HEART RATE: 67 BPM | DIASTOLIC BLOOD PRESSURE: 72 MMHG | HEIGHT: 76 IN | SYSTOLIC BLOOD PRESSURE: 130 MMHG | RESPIRATION RATE: 16 BRPM

## 2018-01-01 VITALS
DIASTOLIC BLOOD PRESSURE: 70 MMHG | WEIGHT: 218 LBS | HEART RATE: 68 BPM | HEIGHT: 76 IN | OXYGEN SATURATION: 95 % | SYSTOLIC BLOOD PRESSURE: 120 MMHG | BODY MASS INDEX: 26.55 KG/M2

## 2018-01-01 VITALS
HEART RATE: 78 BPM | SYSTOLIC BLOOD PRESSURE: 132 MMHG | BODY MASS INDEX: 26.18 KG/M2 | DIASTOLIC BLOOD PRESSURE: 80 MMHG | HEIGHT: 76 IN | WEIGHT: 215 LBS | OXYGEN SATURATION: 94 %

## 2018-01-01 VITALS
HEART RATE: 70 BPM | HEIGHT: 76 IN | BODY MASS INDEX: 26.67 KG/M2 | WEIGHT: 219 LBS | OXYGEN SATURATION: 99 % | DIASTOLIC BLOOD PRESSURE: 70 MMHG | SYSTOLIC BLOOD PRESSURE: 114 MMHG

## 2018-01-01 VITALS
HEART RATE: 84 BPM | BODY MASS INDEX: 26.87 KG/M2 | RESPIRATION RATE: 16 BRPM | WEIGHT: 215 LBS | SYSTOLIC BLOOD PRESSURE: 126 MMHG | OXYGEN SATURATION: 94 % | TEMPERATURE: 98.8 F | DIASTOLIC BLOOD PRESSURE: 56 MMHG

## 2018-01-01 VITALS
OXYGEN SATURATION: 99 % | BODY MASS INDEX: 26.18 KG/M2 | DIASTOLIC BLOOD PRESSURE: 82 MMHG | SYSTOLIC BLOOD PRESSURE: 134 MMHG | HEIGHT: 76 IN | WEIGHT: 215 LBS | HEART RATE: 68 BPM

## 2018-01-01 VITALS
WEIGHT: 231.48 LBS | BODY MASS INDEX: 28.18 KG/M2 | OXYGEN SATURATION: 99 % | TEMPERATURE: 97.2 F | HEART RATE: 90 BPM | RESPIRATION RATE: 17 BRPM

## 2018-01-01 DIAGNOSIS — R06.09 DYSPNEA ON EXERTION: ICD-10-CM

## 2018-01-01 DIAGNOSIS — J96.11 CHRONIC RESPIRATORY FAILURE WITH HYPOXIA (HCC): ICD-10-CM

## 2018-01-01 DIAGNOSIS — I25.84 CORONARY ARTERY DISEASE DUE TO CALCIFIED CORONARY LESION: ICD-10-CM

## 2018-01-01 DIAGNOSIS — I50.20 ACC/AHA STAGE C SYSTOLIC HEART FAILURE (HCC): ICD-10-CM

## 2018-01-01 DIAGNOSIS — G47.33 OSA (OBSTRUCTIVE SLEEP APNEA): ICD-10-CM

## 2018-01-01 DIAGNOSIS — J43.9 PULMONARY EMPHYSEMA, UNSPECIFIED EMPHYSEMA TYPE (HCC): ICD-10-CM

## 2018-01-01 DIAGNOSIS — I10 HTN (HYPERTENSION), BENIGN: ICD-10-CM

## 2018-01-01 DIAGNOSIS — J98.4 RESTRICTIVE LUNG DISEASE: ICD-10-CM

## 2018-01-01 DIAGNOSIS — I50.9 ACUTE ON CHRONIC CONGESTIVE HEART FAILURE, UNSPECIFIED HEART FAILURE TYPE (HCC): ICD-10-CM

## 2018-01-01 DIAGNOSIS — E78.5 DYSLIPIDEMIA: ICD-10-CM

## 2018-01-01 DIAGNOSIS — I25.10 CORONARY ARTERY DISEASE DUE TO CALCIFIED CORONARY LESION: ICD-10-CM

## 2018-01-01 DIAGNOSIS — I50.20 NYHA CLASS 3 SYSTOLIC CONGESTIVE HEART FAILURE WITH REDUCED LEFT VENTRICULAR FUNCTION (HCC): ICD-10-CM

## 2018-01-01 DIAGNOSIS — Z99.81 OXYGEN DEPENDENT: ICD-10-CM

## 2018-01-01 DIAGNOSIS — I50.22 CHRONIC SYSTOLIC CONGESTIVE HEART FAILURE (HCC): ICD-10-CM

## 2018-01-01 DIAGNOSIS — R18.8 OTHER ASCITES: ICD-10-CM

## 2018-01-01 LAB
ACTION RANGE TRIGGERED IACRT: YES
ALBUMIN SERPL BCP-MCNC: 2.8 G/DL (ref 3.2–4.9)
ALBUMIN SERPL BCP-MCNC: 2.9 G/DL (ref 3.2–4.9)
ALBUMIN/GLOB SERPL: 0.8 G/DL
ALBUMIN/GLOB SERPL: 0.9 G/DL
ALP SERPL-CCNC: 44 U/L (ref 30–99)
ALP SERPL-CCNC: 48 U/L (ref 30–99)
ALT SERPL-CCNC: 10 U/L (ref 2–50)
ALT SERPL-CCNC: 9 U/L (ref 2–50)
ANION GAP SERPL CALC-SCNC: 7 MMOL/L (ref 0–11.9)
ANION GAP SERPL CALC-SCNC: 9 MMOL/L (ref 0–11.9)
ANISOCYTOSIS BLD QL SMEAR: ABNORMAL
ANISOCYTOSIS BLD QL SMEAR: ABNORMAL
APPEARANCE FLD: NORMAL
APTT PPP: 41.7 SEC (ref 24.7–36)
APTT PPP: 44 SEC (ref 24.7–36)
AST SERPL-CCNC: 18 U/L (ref 12–45)
AST SERPL-CCNC: 22 U/L (ref 12–45)
BASE EXCESS BLDA CALC-SCNC: 1 MMOL/L (ref -4–3)
BASOPHILS # BLD AUTO: 0 % (ref 0–1.8)
BASOPHILS # BLD: 0 K/UL (ref 0–0.12)
BILIRUB SERPL-MCNC: 2 MG/DL (ref 0.1–1.5)
BILIRUB SERPL-MCNC: 2 MG/DL (ref 0.1–1.5)
BODY FLD TYPE: NORMAL
BODY TEMPERATURE: ABNORMAL DEGREES
BUN SERPL-MCNC: 85 MG/DL (ref 8–22)
BUN SERPL-MCNC: 86 MG/DL (ref 8–22)
BURR CELLS BLD QL SMEAR: NORMAL
CALCIUM SERPL-MCNC: 8.3 MG/DL (ref 8.5–10.5)
CALCIUM SERPL-MCNC: 8.5 MG/DL (ref 8.5–10.5)
CHLORIDE SERPL-SCNC: 92 MMOL/L (ref 96–112)
CHLORIDE SERPL-SCNC: 94 MMOL/L (ref 96–112)
CO2 BLDA-SCNC: 32 MMOL/L (ref 20–33)
CO2 SERPL-SCNC: 31 MMOL/L (ref 20–33)
CO2 SERPL-SCNC: 32 MMOL/L (ref 20–33)
COLOR FLD: NORMAL
CORTIS SERPL-MCNC: 76.2 UG/DL (ref 0–23)
CREAT SERPL-MCNC: 2.98 MG/DL (ref 0.5–1.4)
CREAT SERPL-MCNC: 3.48 MG/DL (ref 0.5–1.4)
CYTOLOGY REG CYTOL: NORMAL
EOSINOPHIL # BLD AUTO: 0 K/UL (ref 0–0.51)
EOSINOPHIL NFR BLD: 0 % (ref 0–6.9)
ERYTHROCYTE [DISTWIDTH] IN BLOOD BY AUTOMATED COUNT: 64.2 FL (ref 35.9–50)
ERYTHROCYTE [DISTWIDTH] IN BLOOD BY AUTOMATED COUNT: 65.1 FL (ref 35.9–50)
ERYTHROCYTE [DISTWIDTH] IN BLOOD BY AUTOMATED COUNT: 65.9 FL (ref 35.9–50)
EST. AVERAGE GLUCOSE BLD GHB EST-MCNC: 120 MG/DL
GIANT PLATELETS BLD QL SMEAR: NORMAL
GIANT PLATELETS BLD QL SMEAR: NORMAL
GLOBULIN SER CALC-MCNC: 3.1 G/DL (ref 1.9–3.5)
GLOBULIN SER CALC-MCNC: 3.3 G/DL (ref 1.9–3.5)
GLUCOSE SERPL-MCNC: 135 MG/DL (ref 65–99)
GLUCOSE SERPL-MCNC: 168 MG/DL (ref 65–99)
GRAM STN SPEC: NORMAL
HBA1C MFR BLD: 5.8 % (ref 0–5.6)
HCO3 BLDA-SCNC: 30.4 MMOL/L (ref 17–25)
HCT VFR BLD AUTO: 35.9 % (ref 42–52)
HCT VFR BLD AUTO: 36 % (ref 42–52)
HCT VFR BLD AUTO: 38.1 % (ref 42–52)
HGB BLD-MCNC: 11.2 G/DL (ref 14–18)
HGB BLD-MCNC: 11.2 G/DL (ref 14–18)
HGB BLD-MCNC: 12 G/DL (ref 14–18)
HISTIOCYTES NFR FLD: 8 %
INR PPP: 1.33 (ref 0.87–1.13)
INST. QUALIFIED PATIENT IIQPT: YES
LACTATE BLD-SCNC: 1.1 MMOL/L (ref 0.5–2)
LACTATE BLD-SCNC: 1.5 MMOL/L (ref 0.5–2)
LACTATE BLD-SCNC: 1.6 MMOL/L (ref 0.5–2)
LIPASE SERPL-CCNC: 16 U/L (ref 11–82)
LV EJECT FRACT  99904: 45
LV EJECT FRACT MOD 2C 99903: 51.02
LV EJECT FRACT MOD 4C 99902: 41.2
LV EJECT FRACT MOD BP 99901: 47.16
LYMPHOCYTES # BLD AUTO: 0 K/UL (ref 1–4.8)
LYMPHOCYTES NFR BLD: 0 % (ref 22–41)
LYMPHOCYTES NFR FLD: 21 %
MACROCYTES BLD QL SMEAR: ABNORMAL
MACROCYTES BLD QL SMEAR: ABNORMAL
MANUAL DIFF BLD: ABNORMAL
MCH RBC QN AUTO: 31.2 PG (ref 27–33)
MCH RBC QN AUTO: 31.3 PG (ref 27–33)
MCH RBC QN AUTO: 31.5 PG (ref 27–33)
MCHC RBC AUTO-ENTMCNC: 31.1 G/DL (ref 33.7–35.3)
MCHC RBC AUTO-ENTMCNC: 31.2 G/DL (ref 33.7–35.3)
MCHC RBC AUTO-ENTMCNC: 31.5 G/DL (ref 33.7–35.3)
MCV RBC AUTO: 100 FL (ref 81.4–97.8)
MCV RBC AUTO: 100.3 FL (ref 81.4–97.8)
MCV RBC AUTO: 100.3 FL (ref 81.4–97.8)
MESOTHL CELL NFR FLD: 3 %
METAMYELOCYTES NFR BLD MANUAL: 1.8 %
METAMYELOCYTES NFR BLD MANUAL: 3.5 %
MONOCYTES # BLD AUTO: 0.52 K/UL (ref 0–0.85)
MONOCYTES # BLD AUTO: 0.55 K/UL (ref 0–0.85)
MONOCYTES # BLD AUTO: 0.64 K/UL (ref 0–0.85)
MONOCYTES NFR BLD AUTO: 4.4 % (ref 0–13.4)
MONOCYTES NFR BLD AUTO: 4.5 % (ref 0–13.4)
MONOCYTES NFR BLD AUTO: 6.1 % (ref 0–13.4)
MONONUC CELLS NFR FLD: 1 %
MORPHOLOGY BLD-IMP: NORMAL
MYELOCYTES NFR BLD MANUAL: 0.9 %
NEUTROPHILS # BLD AUTO: 11.27 K/UL (ref 1.82–7.42)
NEUTROPHILS # BLD AUTO: 11.41 K/UL (ref 1.82–7.42)
NEUTROPHILS # BLD AUTO: 9.4 K/UL (ref 1.82–7.42)
NEUTROPHILS NFR BLD: 61.4 % (ref 44–72)
NEUTROPHILS NFR BLD: 63.1 % (ref 44–72)
NEUTROPHILS NFR BLD: 66.9 % (ref 44–72)
NEUTROPHILS NFR FLD: 67 %
NEUTS BAND NFR BLD MANUAL: 22.6 % (ref 0–10)
NEUTS BAND NFR BLD MANUAL: 29.7 % (ref 0–10)
NEUTS BAND NFR BLD MANUAL: 33.3 % (ref 0–10)
NRBC # BLD AUTO: 0 K/UL
NRBC BLD-RTO: 0 /100 WBC
OVALOCYTES BLD QL SMEAR: NORMAL
PCO2 BLDA: 69.5 MMHG (ref 26–37)
PCO2 TEMP ADJ BLDA: 67.3 MMHG (ref 26–37)
PH BLDA: 7.25 [PH] (ref 7.4–7.5)
PH TEMP ADJ BLDA: 7.26 [PH] (ref 7.4–7.5)
PLATELET # BLD AUTO: 205 K/UL (ref 164–446)
PLATELET # BLD AUTO: 211 K/UL (ref 164–446)
PLATELET # BLD AUTO: 213 K/UL (ref 164–446)
PLATELET BLD QL SMEAR: NORMAL
PMV BLD AUTO: 11.6 FL (ref 9–12.9)
PMV BLD AUTO: 11.7 FL (ref 9–12.9)
PMV BLD AUTO: 11.9 FL (ref 9–12.9)
PO2 BLDA: 117 MMHG (ref 64–87)
PO2 TEMP ADJ BLDA: 112 MMHG (ref 64–87)
POIKILOCYTOSIS BLD QL SMEAR: NORMAL
POTASSIUM SERPL-SCNC: 4.7 MMOL/L (ref 3.6–5.5)
POTASSIUM SERPL-SCNC: 5.3 MMOL/L (ref 3.6–5.5)
PROCALCITONIN SERPL-MCNC: 4.98 NG/ML
PROT SERPL-MCNC: 6 G/DL (ref 6–8.2)
PROT SERPL-MCNC: 6.1 G/DL (ref 6–8.2)
PROTHROMBIN TIME: 16.2 SEC (ref 12–14.6)
RBC # BLD AUTO: 3.58 M/UL (ref 4.7–6.1)
RBC # BLD AUTO: 3.59 M/UL (ref 4.7–6.1)
RBC # BLD AUTO: 3.81 M/UL (ref 4.7–6.1)
RBC # FLD: 2000 CELLS/UL
RBC BLD AUTO: PRESENT
SAO2 % BLDA: 98 % (ref 93–99)
SIGNIFICANT IND 70042: NORMAL
SITE SITE: NORMAL
SODIUM SERPL-SCNC: 131 MMOL/L (ref 135–145)
SODIUM SERPL-SCNC: 134 MMOL/L (ref 135–145)
SOURCE SOURCE: NORMAL
SPECIMEN DRAWN FROM PATIENT: ABNORMAL
TROPONIN I SERPL-MCNC: 0.02 NG/ML (ref 0–0.04)
TSH SERPL DL<=0.005 MIU/L-ACNC: 1.29 UIU/ML (ref 0.38–5.33)
WBC # BLD AUTO: 10.5 K/UL (ref 4.8–10.8)
WBC # BLD AUTO: 11.9 K/UL (ref 4.8–10.8)
WBC # BLD AUTO: 12.3 K/UL (ref 4.8–10.8)
WBC # FLD: NORMAL CELLS/UL

## 2018-01-01 PROCEDURE — A9270 NON-COVERED ITEM OR SERVICE: HCPCS | Performed by: FAMILY MEDICINE

## 2018-01-01 PROCEDURE — 82533 TOTAL CORTISOL: CPT

## 2018-01-01 PROCEDURE — 700111 HCHG RX REV CODE 636 W/ 250 OVERRIDE (IP): Mod: JG | Performed by: INTERNAL MEDICINE

## 2018-01-01 PROCEDURE — 93306 TTE W/DOPPLER COMPLETE: CPT | Mod: 26 | Performed by: INTERNAL MEDICINE

## 2018-01-01 PROCEDURE — 700111 HCHG RX REV CODE 636 W/ 250 OVERRIDE (IP): Performed by: INTERNAL MEDICINE

## 2018-01-01 PROCEDURE — 700111 HCHG RX REV CODE 636 W/ 250 OVERRIDE (IP): Performed by: FAMILY MEDICINE

## 2018-01-01 PROCEDURE — 84484 ASSAY OF TROPONIN QUANT: CPT

## 2018-01-01 PROCEDURE — 99214 OFFICE O/P EST MOD 30 MIN: CPT | Mod: 29 | Performed by: NURSE PRACTITIONER

## 2018-01-01 PROCEDURE — 700105 HCHG RX REV CODE 258: Performed by: INTERNAL MEDICINE

## 2018-01-01 PROCEDURE — 87070 CULTURE OTHR SPECIMN AEROBIC: CPT

## 2018-01-01 PROCEDURE — 71250 CT THORAX DX C-: CPT

## 2018-01-01 PROCEDURE — 99214 OFFICE O/P EST MOD 30 MIN: CPT | Performed by: NURSE PRACTITIONER

## 2018-01-01 PROCEDURE — 80053 COMPREHEN METABOLIC PANEL: CPT

## 2018-01-01 PROCEDURE — 85730 THROMBOPLASTIN TIME PARTIAL: CPT

## 2018-01-01 PROCEDURE — 94761 N-INVAS EAR/PLS OXIMETRY MLT: CPT | Performed by: NURSE PRACTITIONER

## 2018-01-01 PROCEDURE — 85007 BL SMEAR W/DIFF WBC COUNT: CPT

## 2018-01-01 PROCEDURE — 51798 US URINE CAPACITY MEASURE: CPT

## 2018-01-01 PROCEDURE — 83036 HEMOGLOBIN GLYCOSYLATED A1C: CPT

## 2018-01-01 PROCEDURE — 770022 HCHG ROOM/CARE - ICU (200)

## 2018-01-01 PROCEDURE — 700111 HCHG RX REV CODE 636 W/ 250 OVERRIDE (IP)

## 2018-01-01 PROCEDURE — 700105 HCHG RX REV CODE 258: Performed by: FAMILY MEDICINE

## 2018-01-01 PROCEDURE — 87205 SMEAR GRAM STAIN: CPT

## 2018-01-01 PROCEDURE — 71045 X-RAY EXAM CHEST 1 VIEW: CPT

## 2018-01-01 PROCEDURE — 700105 HCHG RX REV CODE 258: Performed by: EMERGENCY MEDICINE

## 2018-01-01 PROCEDURE — 99214 OFFICE O/P EST MOD 30 MIN: CPT | Performed by: INTERNAL MEDICINE

## 2018-01-01 PROCEDURE — 83690 ASSAY OF LIPASE: CPT

## 2018-01-01 PROCEDURE — 83605 ASSAY OF LACTIC ACID: CPT

## 2018-01-01 PROCEDURE — 88112 CYTOPATH CELL ENHANCE TECH: CPT

## 2018-01-01 PROCEDURE — 85027 COMPLETE CBC AUTOMATED: CPT

## 2018-01-01 PROCEDURE — 94640 AIRWAY INHALATION TREATMENT: CPT

## 2018-01-01 PROCEDURE — 87015 SPECIMEN INFECT AGNT CONCNTJ: CPT

## 2018-01-01 PROCEDURE — 700101 HCHG RX REV CODE 250: Performed by: FAMILY MEDICINE

## 2018-01-01 PROCEDURE — 87186 SC STD MICRODIL/AGAR DIL: CPT

## 2018-01-01 PROCEDURE — 84443 ASSAY THYROID STIM HORMONE: CPT

## 2018-01-01 PROCEDURE — 88305 TISSUE EXAM BY PATHOLOGIST: CPT

## 2018-01-01 PROCEDURE — 87077 CULTURE AEROBIC IDENTIFY: CPT | Mod: 91

## 2018-01-01 PROCEDURE — 75561 CARDIAC MRI FOR MORPH W/DYE: CPT

## 2018-01-01 PROCEDURE — 700101 HCHG RX REV CODE 250

## 2018-01-01 PROCEDURE — 84145 PROCALCITONIN (PCT): CPT

## 2018-01-01 PROCEDURE — 99291 CRITICAL CARE FIRST HOUR: CPT | Performed by: FAMILY MEDICINE

## 2018-01-01 PROCEDURE — 82042 OTHER SOURCE ALBUMIN QUAN EA: CPT

## 2018-01-01 PROCEDURE — 94760 N-INVAS EAR/PLS OXIMETRY 1: CPT

## 2018-01-01 PROCEDURE — 94618 PULMONARY STRESS TESTING: CPT | Performed by: INTERNAL MEDICINE

## 2018-01-01 PROCEDURE — 99291 CRITICAL CARE FIRST HOUR: CPT

## 2018-01-01 PROCEDURE — P9047 ALBUMIN (HUMAN), 25%, 50ML: HCPCS | Mod: JG | Performed by: INTERNAL MEDICINE

## 2018-01-01 PROCEDURE — 85610 PROTHROMBIN TIME: CPT

## 2018-01-01 PROCEDURE — 36600 WITHDRAWAL OF ARTERIAL BLOOD: CPT

## 2018-01-01 PROCEDURE — 36556 INSERT NON-TUNNEL CV CATH: CPT | Mod: RT | Performed by: INTERNAL MEDICINE

## 2018-01-01 PROCEDURE — 3E033XZ INTRODUCTION OF VASOPRESSOR INTO PERIPHERAL VEIN, PERCUTANEOUS APPROACH: ICD-10-PCS | Performed by: FAMILY MEDICINE

## 2018-01-01 PROCEDURE — 99233 SBSQ HOSP IP/OBS HIGH 50: CPT | Performed by: HOSPITALIST

## 2018-01-01 PROCEDURE — 700117 HCHG RX CONTRAST REV CODE 255: Performed by: NURSE PRACTITIONER

## 2018-01-01 PROCEDURE — 93306 TTE W/DOPPLER COMPLETE: CPT

## 2018-01-01 PROCEDURE — A9577 INJ MULTIHANCE: HCPCS | Performed by: NURSE PRACTITIONER

## 2018-01-01 PROCEDURE — 82803 BLOOD GASES ANY COMBINATION: CPT

## 2018-01-01 PROCEDURE — 89051 BODY FLUID CELL COUNT: CPT

## 2018-01-01 PROCEDURE — B548ZZA ULTRASONOGRAPHY OF SUPERIOR VENA CAVA, GUIDANCE: ICD-10-PCS | Performed by: INTERNAL MEDICINE

## 2018-01-01 PROCEDURE — 700102 HCHG RX REV CODE 250 W/ 637 OVERRIDE(OP): Performed by: FAMILY MEDICINE

## 2018-01-01 PROCEDURE — 0W9G3ZZ DRAINAGE OF PERITONEAL CAVITY, PERCUTANEOUS APPROACH: ICD-10-PCS | Performed by: RADIOLOGY

## 2018-01-01 PROCEDURE — 99214 OFFICE O/P EST MOD 30 MIN: CPT | Mod: 25 | Performed by: INTERNAL MEDICINE

## 2018-01-01 PROCEDURE — 83605 ASSAY OF LACTIC ACID: CPT | Mod: 91

## 2018-01-01 PROCEDURE — 49083 ABD PARACENTESIS W/IMAGING: CPT

## 2018-01-01 PROCEDURE — 99291 CRITICAL CARE FIRST HOUR: CPT | Mod: 25 | Performed by: INTERNAL MEDICINE

## 2018-01-01 PROCEDURE — 02HV33Z INSERTION OF INFUSION DEVICE INTO SUPERIOR VENA CAVA, PERCUTANEOUS APPROACH: ICD-10-PCS | Performed by: INTERNAL MEDICINE

## 2018-01-01 RX ORDER — SODIUM CHLORIDE 9 MG/ML
1000 INJECTION, SOLUTION INTRAVENOUS ONCE
Status: COMPLETED | OUTPATIENT
Start: 2018-01-01 | End: 2018-01-01

## 2018-01-01 RX ORDER — LORAZEPAM 2 MG/ML
1 CONCENTRATE ORAL
Status: DISCONTINUED | OUTPATIENT
Start: 2018-01-01 | End: 2018-06-30 | Stop reason: HOSPADM

## 2018-01-01 RX ORDER — ESOMEPRAZOLE MAGNESIUM 40 MG/1
40 CAPSULE, DELAYED RELEASE ORAL DAILY
COMMUNITY

## 2018-01-01 RX ORDER — ATROPINE SULFATE 10 MG/ML
2 SOLUTION/ DROPS OPHTHALMIC EVERY 4 HOURS PRN
Status: DISCONTINUED | OUTPATIENT
Start: 2018-01-01 | End: 2018-06-30 | Stop reason: HOSPADM

## 2018-01-01 RX ORDER — POTASSIUM CHLORIDE 20 MEQ/1
20 TABLET, EXTENDED RELEASE ORAL 2 TIMES DAILY
Qty: 60 TAB | Refills: 11 | Status: SHIPPED | OUTPATIENT
Start: 2018-01-01

## 2018-01-01 RX ORDER — NOREPINEPHRINE BITARTRATE 1 MG/ML
INJECTION, SOLUTION INTRAVENOUS
Status: COMPLETED
Start: 2018-01-01 | End: 2018-01-01

## 2018-01-01 RX ORDER — ONDANSETRON 4 MG/1
4 TABLET, ORALLY DISINTEGRATING ORAL EVERY 4 HOURS PRN
Status: DISCONTINUED | OUTPATIENT
Start: 2018-01-01 | End: 2018-06-30 | Stop reason: HOSPADM

## 2018-01-01 RX ORDER — BISOPROLOL FUMARATE 5 MG/1
5 TABLET, FILM COATED ORAL DAILY
Qty: 90 TAB | Refills: 3 | Status: SHIPPED | OUTPATIENT
Start: 2018-01-01 | End: 2018-01-01

## 2018-01-01 RX ORDER — FUROSEMIDE 10 MG/ML
100 INJECTION INTRAMUSCULAR; INTRAVENOUS
Status: DISCONTINUED | OUTPATIENT
Start: 2018-01-01 | End: 2018-01-01

## 2018-01-01 RX ORDER — FUROSEMIDE 40 MG/1
40 TABLET ORAL 2 TIMES DAILY
Qty: 60 TAB | Refills: 11 | Status: SHIPPED | OUTPATIENT
Start: 2018-01-01 | End: 2018-01-01

## 2018-01-01 RX ORDER — IPRATROPIUM BROMIDE 42 UG/1
1 SPRAY, METERED NASAL DAILY
Refills: 11 | COMMUNITY
Start: 2018-01-01

## 2018-01-01 RX ORDER — TAMSULOSIN HYDROCHLORIDE 0.4 MG/1
0.4 CAPSULE ORAL EVERY EVENING
Status: DISCONTINUED | OUTPATIENT
Start: 2018-01-01 | End: 2018-01-01

## 2018-01-01 RX ORDER — BISACODYL 10 MG
10 SUPPOSITORY, RECTAL RECTAL
Status: DISCONTINUED | OUTPATIENT
Start: 2018-01-01 | End: 2018-06-30 | Stop reason: HOSPADM

## 2018-01-01 RX ORDER — SODIUM CHLORIDE 9 MG/ML
INJECTION, SOLUTION INTRAVENOUS CONTINUOUS
Status: DISCONTINUED | OUTPATIENT
Start: 2018-01-01 | End: 2018-01-01

## 2018-01-01 RX ORDER — FUROSEMIDE 80 MG
80 TABLET ORAL 2 TIMES DAILY
COMMUNITY

## 2018-01-01 RX ORDER — MIDAZOLAM HYDROCHLORIDE 1 MG/ML
INJECTION INTRAMUSCULAR; INTRAVENOUS
Status: COMPLETED
Start: 2018-01-01 | End: 2018-01-01

## 2018-01-01 RX ORDER — HEPARIN SODIUM 5000 [USP'U]/ML
5000 INJECTION, SOLUTION INTRAVENOUS; SUBCUTANEOUS EVERY 8 HOURS
Status: DISCONTINUED | OUTPATIENT
Start: 2018-01-01 | End: 2018-01-01

## 2018-01-01 RX ORDER — ALBUMIN (HUMAN) 12.5 G/50ML
25 SOLUTION INTRAVENOUS EVERY 6 HOURS
Status: DISCONTINUED | OUTPATIENT
Start: 2018-01-01 | End: 2018-01-01

## 2018-01-01 RX ORDER — BUDESONIDE 0.5 MG/2ML
0.5 INHALANT ORAL
Status: DISCONTINUED | OUTPATIENT
Start: 2018-01-01 | End: 2018-01-01

## 2018-01-01 RX ORDER — EUCALYPTUS/PEPPERMINT OIL
10 SOLUTION, NON-ORAL NASAL EVERY 6 HOURS PRN
COMMUNITY

## 2018-01-01 RX ORDER — QUINIDINE GLUCONATE 324 MG
240 TABLET, EXTENDED RELEASE ORAL EVERY MORNING
COMMUNITY

## 2018-01-01 RX ORDER — ROSUVASTATIN CALCIUM 10 MG/1
40 TABLET, COATED ORAL DAILY
Status: DISCONTINUED | OUTPATIENT
Start: 2018-01-01 | End: 2018-01-01

## 2018-01-01 RX ORDER — SODIUM CHLORIDE 9 MG/ML
INJECTION, SOLUTION INTRAVENOUS
Status: ACTIVE
Start: 2018-01-01 | End: 2018-01-01

## 2018-01-01 RX ORDER — SODIUM CHLORIDE 9 MG/ML
500 INJECTION, SOLUTION INTRAVENOUS
Status: DISCONTINUED | OUTPATIENT
Start: 2018-01-01 | End: 2018-01-01

## 2018-01-01 RX ORDER — ALBUMIN (HUMAN) 12.5 G/50ML
25 SOLUTION INTRAVENOUS ONCE
Status: COMPLETED | OUTPATIENT
Start: 2018-01-01 | End: 2018-01-01

## 2018-01-01 RX ORDER — FUROSEMIDE 20 MG/1
20 TABLET ORAL 2 TIMES DAILY
Qty: 60 TAB | Refills: 11 | Status: SHIPPED | OUTPATIENT
Start: 2018-01-01 | End: 2018-01-01 | Stop reason: SDUPTHER

## 2018-01-01 RX ORDER — ALBUTEROL SULFATE 90 UG/1
2 AEROSOL, METERED RESPIRATORY (INHALATION) EVERY 6 HOURS PRN
COMMUNITY

## 2018-01-01 RX ORDER — LORAZEPAM 2 MG/ML
2-4 INJECTION INTRAMUSCULAR
Status: DISCONTINUED | OUTPATIENT
Start: 2018-01-01 | End: 2018-06-30 | Stop reason: HOSPADM

## 2018-01-01 RX ORDER — IPRATROPIUM BROMIDE 42 UG/1
1 SPRAY, METERED NASAL DAILY
Status: DISCONTINUED | OUTPATIENT
Start: 2018-01-01 | End: 2018-01-01

## 2018-01-01 RX ORDER — MORPHINE SULFATE 10 MG/ML
5 INJECTION, SOLUTION INTRAMUSCULAR; INTRAVENOUS
Status: DISCONTINUED | OUTPATIENT
Start: 2018-01-01 | End: 2018-06-30 | Stop reason: HOSPADM

## 2018-01-01 RX ORDER — MORPHINE SULFATE 100 MG/5ML
10 SOLUTION ORAL
Status: DISCONTINUED | OUTPATIENT
Start: 2018-01-01 | End: 2018-06-30 | Stop reason: HOSPADM

## 2018-01-01 RX ORDER — ALBUTEROL SULFATE 90 UG/1
2 AEROSOL, METERED RESPIRATORY (INHALATION) EVERY 4 HOURS PRN
Status: DISCONTINUED | OUTPATIENT
Start: 2018-01-01 | End: 2018-06-30 | Stop reason: HOSPADM

## 2018-01-01 RX ORDER — POLYVINYL ALCOHOL 14 MG/ML
2 SOLUTION/ DROPS OPHTHALMIC EVERY 6 HOURS PRN
Status: DISCONTINUED | OUTPATIENT
Start: 2018-01-01 | End: 2018-06-30 | Stop reason: HOSPADM

## 2018-01-01 RX ORDER — ESOMEPRAZOLE MAGNESIUM 40 MG/1
40 GRANULE, DELAYED RELEASE ORAL
COMMUNITY
End: 2018-01-01

## 2018-01-01 RX ORDER — SODIUM CHLORIDE 9 MG/ML
500 INJECTION, SOLUTION INTRAVENOUS ONCE
Status: COMPLETED | OUTPATIENT
Start: 2018-01-01 | End: 2018-01-01

## 2018-01-01 RX ORDER — SPIRONOLACTONE 25 MG/1
12.5 TABLET ORAL DAILY
Qty: 30 TAB | Refills: 3 | Status: SHIPPED | OUTPATIENT
Start: 2018-01-01

## 2018-01-01 RX ORDER — AMOXICILLIN 250 MG
2 CAPSULE ORAL 2 TIMES DAILY
Status: DISCONTINUED | OUTPATIENT
Start: 2018-01-01 | End: 2018-06-30 | Stop reason: HOSPADM

## 2018-01-01 RX ORDER — LACTOSE
50 POWDER (GRAM) MISCELLANEOUS ONCE
COMMUNITY
End: 2018-01-01

## 2018-01-01 RX ORDER — POLYETHYLENE GLYCOL 3350 17 G/17G
1 POWDER, FOR SOLUTION ORAL
Status: DISCONTINUED | OUTPATIENT
Start: 2018-01-01 | End: 2018-06-30 | Stop reason: HOSPADM

## 2018-01-01 RX ORDER — ONDANSETRON 2 MG/ML
4 INJECTION INTRAMUSCULAR; INTRAVENOUS EVERY 4 HOURS PRN
Status: DISCONTINUED | OUTPATIENT
Start: 2018-01-01 | End: 2018-06-30 | Stop reason: HOSPADM

## 2018-01-01 RX ORDER — BISOPROLOL FUMARATE 5 MG/1
2.5 TABLET, FILM COATED ORAL
COMMUNITY

## 2018-01-01 RX ORDER — METOLAZONE 2.5 MG/1
2.5 TABLET ORAL
Qty: 30 TAB | Refills: 11 | Status: SHIPPED | OUTPATIENT
Start: 2018-01-01

## 2018-01-01 RX ORDER — MORPHINE SULFATE 10 MG/ML
10 INJECTION, SOLUTION INTRAMUSCULAR; INTRAVENOUS
Status: DISCONTINUED | OUTPATIENT
Start: 2018-01-01 | End: 2018-06-30 | Stop reason: HOSPADM

## 2018-01-01 RX ADMIN — DOBUTAMINE IN DEXTROSE 20 MCG/KG/MIN: 100 INJECTION, SOLUTION INTRAVENOUS at 21:34

## 2018-01-01 RX ADMIN — NOREPINEPHRINE BITARTRATE 8 MG: 1 INJECTION INTRAVENOUS at 10:52

## 2018-01-01 RX ADMIN — MIDAZOLAM HYDROCHLORIDE 1 MG: 1 INJECTION, SOLUTION INTRAMUSCULAR; INTRAVENOUS at 04:27

## 2018-01-01 RX ADMIN — HEPARIN SODIUM 5000 UNITS: 5000 INJECTION, SOLUTION INTRAVENOUS; SUBCUTANEOUS at 21:34

## 2018-01-01 RX ADMIN — LORAZEPAM 2 MG: 2 INJECTION INTRAMUSCULAR; INTRAVENOUS at 16:37

## 2018-01-01 RX ADMIN — SODIUM CHLORIDE 500 ML: 9 INJECTION, SOLUTION INTRAVENOUS at 01:58

## 2018-01-01 RX ADMIN — DOBUTAMINE IN DEXTROSE 20 MCG/KG/MIN: 100 INJECTION, SOLUTION INTRAVENOUS at 01:34

## 2018-01-01 RX ADMIN — SODIUM CHLORIDE 1000 ML: 9 INJECTION, SOLUTION INTRAVENOUS at 16:45

## 2018-01-01 RX ADMIN — DOBUTAMINE IN DEXTROSE 5 MCG/KG/MIN: 100 INJECTION, SOLUTION INTRAVENOUS at 18:25

## 2018-01-01 RX ADMIN — GADOBENATE DIMEGLUMINE 10 ML: 529 INJECTION, SOLUTION INTRAVENOUS at 14:43

## 2018-01-01 RX ADMIN — DOBUTAMINE IN DEXTROSE 20 MCG/KG/MIN: 100 INJECTION, SOLUTION INTRAVENOUS at 23:31

## 2018-01-01 RX ADMIN — DOBUTAMINE IN DEXTROSE 5 MCG/KG/MIN: 100 INJECTION, SOLUTION INTRAVENOUS at 04:28

## 2018-01-01 RX ADMIN — TAMSULOSIN HYDROCHLORIDE 0.4 MG: 0.4 CAPSULE ORAL at 21:59

## 2018-01-01 RX ADMIN — FUROSEMIDE 100 MG: 10 INJECTION, SOLUTION INTRAVENOUS at 18:25

## 2018-01-01 RX ADMIN — LORAZEPAM 2 MG: 2 INJECTION INTRAMUSCULAR; INTRAVENOUS at 13:22

## 2018-01-01 RX ADMIN — HEPARIN SODIUM 5000 UNITS: 5000 INJECTION, SOLUTION INTRAVENOUS; SUBCUTANEOUS at 05:37

## 2018-01-01 RX ADMIN — IPRATROPIUM BROMIDE 1 SPRAY: 42 SPRAY NASAL at 09:00

## 2018-01-01 RX ADMIN — NOREPINEPHRINE BITARTRATE 5 MCG/MIN: 1 INJECTION INTRAVENOUS at 22:49

## 2018-01-01 RX ADMIN — ALBUMIN (HUMAN) 25 G: 0.25 INJECTION, SOLUTION INTRAVENOUS at 01:58

## 2018-01-01 RX ADMIN — PIPERACILLIN AND TAZOBACTAM 4.5 G: 4; .5 INJECTION, POWDER, LYOPHILIZED, FOR SOLUTION INTRAVENOUS; PARENTERAL at 04:49

## 2018-01-01 RX ADMIN — VASOPRESSIN 0.03 UNITS/MIN: 20 INJECTION INTRAVENOUS at 04:38

## 2018-01-01 RX ADMIN — MORPHINE SULFATE 10 MG: 10 INJECTION INTRAVENOUS at 13:23

## 2018-01-01 RX ADMIN — MORPHINE SULFATE 5 MG: 10 INJECTION INTRAVENOUS at 16:38

## 2018-01-01 RX ADMIN — SODIUM CHLORIDE 1000 ML: 9 INJECTION, SOLUTION INTRAVENOUS at 15:36

## 2018-01-01 RX ADMIN — DOBUTAMINE IN DEXTROSE 5 MCG/KG/MIN: 100 INJECTION, SOLUTION INTRAVENOUS at 12:39

## 2018-01-01 RX ADMIN — ROSUVASTATIN CALCIUM 40 MG: 10 TABLET, FILM COATED ORAL at 08:27

## 2018-01-01 RX ADMIN — ONDANSETRON 4 MG: 2 INJECTION INTRAMUSCULAR; INTRAVENOUS at 13:22

## 2018-01-01 RX ADMIN — SODIUM BICARBONATE 50 MEQ: 84 INJECTION, SOLUTION INTRAVENOUS at 04:45

## 2018-01-01 RX ADMIN — BUDESONIDE 0.5 MG: 0.5 SUSPENSION RESPIRATORY (INHALATION) at 11:34

## 2018-01-01 RX ADMIN — ONDANSETRON 4 MG: 2 INJECTION INTRAMUSCULAR; INTRAVENOUS at 19:45

## 2018-01-01 RX ADMIN — PIPERACILLIN AND TAZOBACTAM 4.5 G: 4; .5 INJECTION, POWDER, LYOPHILIZED, FOR SOLUTION INTRAVENOUS; PARENTERAL at 21:48

## 2018-01-01 RX ADMIN — PIPERACILLIN AND TAZOBACTAM 4.5 G: 4; .5 INJECTION, POWDER, LYOPHILIZED, FOR SOLUTION INTRAVENOUS; PARENTERAL at 19:48

## 2018-01-01 RX ADMIN — NOREPINEPHRINE BITARTRATE 30 MCG/MIN: 1 INJECTION INTRAVENOUS at 11:29

## 2018-01-01 RX ADMIN — FENTANYL CITRATE 50 MCG: 50 INJECTION, SOLUTION INTRAMUSCULAR; INTRAVENOUS at 04:28

## 2018-01-01 ASSESSMENT — ENCOUNTER SYMPTOMS
HEADACHES: 0
MYALGIAS: 0
DIZZINESS: 1
PALPITATIONS: 0
DIZZINESS: 1
WHEEZING: 0
SHORTNESS OF BREATH: 1
SHORTNESS OF BREATH: 1
ABDOMINAL PAIN: 0
PALPITATIONS: 0
FEVER: 0
SHORTNESS OF BREATH: 1
HEMOPTYSIS: 0
NAUSEA: 1
BLURRED VISION: 0
CHILLS: 1
ABDOMINAL PAIN: 0
COUGH: 0
COUGH: 0
MYALGIAS: 0
PND: 0
NECK PAIN: 0
ORTHOPNEA: 0
MYALGIAS: 0
FEVER: 1
DOUBLE VISION: 0
TINGLING: 0
CLAUDICATION: 0
VOMITING: 1
DIZZINESS: 1
CLAUDICATION: 0
ORTHOPNEA: 0
BACK PAIN: 0
DEPRESSION: 0
FEVER: 0
BRUISES/BLEEDS EASILY: 0
PALPITATIONS: 0
COUGH: 1
PND: 0

## 2018-01-01 ASSESSMENT — PAIN SCALES - GENERAL
PAINLEVEL_OUTOF10: 6
PAINLEVEL_OUTOF10: 5
PAINLEVEL_OUTOF10: 3
PAINLEVEL_OUTOF10: 6
PAINLEVEL_OUTOF10: 5
PAINLEVEL_OUTOF10: 4

## 2018-01-01 ASSESSMENT — COPD QUESTIONNAIRES
DURING THE PAST 4 WEEKS HOW MUCH DID YOU FEEL SHORT OF BREATH: SOME OF THE TIME
DO YOU EVER COUGH UP ANY MUCUS OR PHLEGM?: YES, A FEW DAYS A WEEK OR MONTH
HAVE YOU SMOKED AT LEAST 100 CIGARETTES IN YOUR ENTIRE LIFE: NO/DON'T KNOW
COPD SCREENING SCORE: 6

## 2018-01-01 ASSESSMENT — 6 MINUTE WALK TEST (6MWT): TOTAL DISTANCE WALKED (METERS): 183

## 2018-01-01 ASSESSMENT — LIFESTYLE VARIABLES: EVER_SMOKED: NEVER

## 2018-01-02 ENCOUNTER — APPOINTMENT (RX ONLY)
Dept: URBAN - NONMETROPOLITAN AREA CLINIC 15 | Facility: CLINIC | Age: 78
Setting detail: DERMATOLOGY
End: 2018-01-02

## 2018-01-02 DIAGNOSIS — L57.0 ACTINIC KERATOSIS: ICD-10-CM

## 2018-01-02 PROBLEM — D48.5 NEOPLASM OF UNCERTAIN BEHAVIOR OF SKIN: Status: ACTIVE | Noted: 2018-01-02

## 2018-01-02 PROCEDURE — ? LIQUID NITROGEN

## 2018-01-02 PROCEDURE — 17000 DESTRUCT PREMALG LESION: CPT

## 2018-01-02 PROCEDURE — 17003 DESTRUCT PREMALG LES 2-14: CPT

## 2018-01-02 PROCEDURE — ? EXCISION

## 2018-01-02 PROCEDURE — 11642 EXC F/E/E/N/L MAL+MRG 1.1-2: CPT | Mod: 59

## 2018-01-02 ASSESSMENT — LOCATION SIMPLE DESCRIPTION DERM
LOCATION SIMPLE: RIGHT FOREARM
LOCATION SIMPLE: LEFT FOREARM
LOCATION SIMPLE: LEFT WRIST
LOCATION SIMPLE: LEFT EAR
LOCATION SIMPLE: RIGHT HAND
LOCATION SIMPLE: RIGHT EAR

## 2018-01-02 ASSESSMENT — LOCATION DETAILED DESCRIPTION DERM
LOCATION DETAILED: RIGHT DISTAL DORSAL FOREARM
LOCATION DETAILED: LEFT DISTAL DORSAL FOREARM
LOCATION DETAILED: RIGHT PROXIMAL DORSAL FOREARM
LOCATION DETAILED: RIGHT RADIAL DORSAL HAND
LOCATION DETAILED: LEFT DORSAL WRIST
LOCATION DETAILED: LEFT INFERIOR CRUS OF ANTIHELIX
LOCATION DETAILED: RIGHT SUPERIOR HELIX
LOCATION DETAILED: RIGHT SCAPHA
LOCATION DETAILED: RIGHT ULNAR DORSAL HAND
LOCATION DETAILED: LEFT SUPERIOR CRUS OF ANTIHELIX

## 2018-01-02 ASSESSMENT — LOCATION ZONE DERM
LOCATION ZONE: EAR
LOCATION ZONE: HAND
LOCATION ZONE: ARM

## 2018-01-02 NOTE — PROCEDURE: EXCISION
Star Wedge Flap Text: The defect edges were debeveled with a #15 scalpel blade.  Given the location of the defect, shape of the defect and the proximity to free margins a star wedge flap was deemed most appropriate.  Using a sterile surgical marker, an appropriate rotation flap was drawn incorporating the defect and placing the expected incisions within the relaxed skin tension lines where possible. The area thus outlined was incised deep to adipose tissue with a #15 scalpel blade.  The skin margins were undermined to an appropriate distance in all directions utilizing iris scissors.
Composite Graft Text: The defect edges were debeveled with a #15 scalpel blade.  Given the location of the defect, shape of the defect, the proximity to free margins and the fact the defect was full thickness a composite graft was deemed most appropriate.  The defect was outline and then transferred to the donor site.  A full thickness graft was then excised from the donor site. The graft was then placed in the primary defect, oriented appropriately and then sutured into place.  The secondary defect was then repaired using a primary closure.
Keystone Flap Text: The defect edges were debeveled with a #15 scalpel blade.  Given the location of the defect, shape of the defect a keystone flap was deemed most appropriate.  Using a sterile surgical marker, an appropriate keystone flap was drawn incorporating the defect, outlining the appropriate donor tissue and placing the expected incisions within the relaxed skin tension lines where possible. The area thus outlined was incised deep to adipose tissue with a #15 scalpel blade.  The skin margins were undermined to an appropriate distance in all directions around the primary defect and laterally outward around the flap utilizing iris scissors.
Complex Repair And Z Plasty Text: The defect edges were debeveled with a #15 scalpel blade.  The primary defect was closed partially with a complex linear closure.  Given the location of the remaining defect, shape of the defect and the proximity to free margins a Z plasty was deemed most appropriate for complete closure of the defect.  Using a sterile surgical marker, an appropriate advancement flap was drawn incorporating the defect and placing the expected incisions within the relaxed skin tension lines where possible.    The area thus outlined was incised deep to adipose tissue with a #15 scalpel blade.  The skin margins were undermined to an appropriate distance in all directions utilizing iris scissors.
Excision Method: Saucerization
Epidermal Closure Graft Donor Site (Optional): simple interrupted
Complex Repair Preamble Text (Leave Blank If You Do Not Want): Extensive wide undermining was performed.
Hemostasis: Electrocautery
Lip Wedge Excision Repair Text: Given the location of the defect and the proximity to free margins a full thickness wedge repair was deemed most appropriate.  Using a sterile surgical marker, the appropriate repair was drawn incorporating the defect and placing the expected incisions perpendicular to the vermilion border.  The vermilion border was also meticulously outlined to ensure appropriate reapproximation during the repair.  The area thus outlined was incised through and through with a #15 scalpel blade.  The muscularis and dermis were reaproximated with deep sutures following hemostasis. Care was taken to realign the vermilion border before proceeding with the superficial closure.  Once the vermilion was realigned the superfical and mucosal closure was finished.
O-L Flap Text: The defect edges were debeveled with a #15 scalpel blade.  Given the location of the defect, shape of the defect and the proximity to free margins an O-L flap was deemed most appropriate.  Using a sterile surgical marker, an appropriate advancement flap was drawn incorporating the defect and placing the expected incisions within the relaxed skin tension lines where possible.    The area thus outlined was incised deep to adipose tissue with a #15 scalpel blade.  The skin margins were undermined to an appropriate distance in all directions utilizing iris scissors.
Post-Care Instructions: I reviewed with the patient in detail post-care instructions. Patient is not to engage in any heavy lifting, exercise, or swimming for the next 14 days. Should the patient develop any fevers, chills, bleeding, severe pain patient will contact the office immediately.
Burow's Advancement Flap Text: The defect edges were debeveled with a #15 scalpel blade.  Given the location of the defect and the proximity to free margins a Burow's advancement flap was deemed most appropriate.  Using a sterile surgical marker, the appropriate advancement flap was drawn incorporating the defect and placing the expected incisions within the relaxed skin tension lines where possible.    The area thus outlined was incised deep to adipose tissue with a #15 scalpel blade.  The skin margins were undermined to an appropriate distance in all directions utilizing iris scissors.
Purse String (Simple) Text: Given the location of the defect and the characteristics of the surrounding skin a purse string simple closure was deemed most appropriate.  Undermining was performed circumferentially around the surgical defect.  A purse string suture was then placed and tightened.
Detail Level: Detailed
Mucosal Advancement Flap Text: Given the location of the defect, shape of the defect and the proximity to free margins a mucosal advancement flap was deemed most appropriate. Incisions were made with a 15 blade scalpel in the appropriate fashion along the cutaneous vermilion border and the mucosal lip. The remaining actinically damaged mucosal tissue was excised.  The mucosal advancement flap was then elevated to the gingival sulcus with care taken to preserve the neurovascular structures and advanced into the primary defect. Care was taken to ensure that precise realignment of the vermilion border was achieved.
Show Curettage Variables: Yes
Complex Repair And W Plasty Text: The defect edges were debeveled with a #15 scalpel blade.  The primary defect was closed partially with a complex linear closure.  Given the location of the remaining defect, shape of the defect and the proximity to free margins a W plasty was deemed most appropriate for complete closure of the defect.  Using a sterile surgical marker, an appropriate advancement flap was drawn incorporating the defect and placing the expected incisions within the relaxed skin tension lines where possible.    The area thus outlined was incised deep to adipose tissue with a #15 scalpel blade.  The skin margins were undermined to an appropriate distance in all directions utilizing iris scissors.
Bilobed Transposition Flap Text: The defect edges were debeveled with a #15 scalpel blade.  Given the location of the defect and the proximity to free margins a bilobed transposition flap was deemed most appropriate.  Using a sterile surgical marker, an appropriate bilobe flap drawn around the defect.    The area thus outlined was incised deep to adipose tissue with a #15 scalpel blade.  The skin margins were undermined to an appropriate distance in all directions utilizing iris scissors.
Additional Anesthesia Volume In Cc: 6
Secondary Defect Length (In Cm): 0
Spiral Flap Text: The defect edges were debeveled with a #15 scalpel blade.  Given the location of the defect, shape of the defect and the proximity to free margins a spiral flap was deemed most appropriate.  Using a sterile surgical marker, an appropriate rotation flap was drawn incorporating the defect and placing the expected incisions within the relaxed skin tension lines where possible. The area thus outlined was incised deep to adipose tissue with a #15 scalpel blade.  The skin margins were undermined to an appropriate distance in all directions utilizing iris scissors.
Path Notes (To The Dermatopathologist): Please check margins.
Elliptical Excision Additional Text (Leave Blank If You Do Not Want): The margin was drawn around the clinically apparent lesion.  An elliptical shape was then drawn on the skin incorporating the lesion and margins.  Incisions were then made along these lines to the appropriate tissue plane and the lesion was extirpated.
Anesthesia Type: 1% lidocaine with epinephrine
Cheek Interpolation Flap Text: A decision was made to reconstruct the defect utilizing an interpolation axial flap and a staged reconstruction.  A telfa template was made of the defect.  This telfa template was then used to outline the Cheek Interpolation flap.  The donor area for the pedicle flap was then injected with anesthesia.  The flap was excised through the skin and subcutaneous tissue down to the layer of the underlying musculature.  The interpolation flap was carefully excised within this deep plane to maintain its blood supply.  The edges of the donor site were undermined.   The donor site was closed in a primary fashion.  The pedicle was then rotated into position and sutured.  Once the tube was sutured into place, adequate blood supply was confirmed with blanching and refill.  The pedicle was then wrapped with xeroform gauze and dressed appropriately with a telfa and gauze bandage to ensure continued blood supply and protect the attached pedicle.
O-T Advancement Flap Text: The defect edges were debeveled with a #15 scalpel blade.  Given the location of the defect, shape of the defect and the proximity to free margins an O-T advancement flap was deemed most appropriate.  Using a sterile surgical marker, an appropriate advancement flap was drawn incorporating the defect and placing the expected incisions within the relaxed skin tension lines where possible.    The area thus outlined was incised deep to adipose tissue with a #15 scalpel blade.  The skin margins were undermined to an appropriate distance in all directions utilizing iris scissors.
Z Plasty Text: The lesion was extirpated to the level of the fat with a #15 scalpel blade.  Given the location of the defect, shape of the defect and the proximity to free margins a Z-plasty was deemed most appropriate for repair.  Using a sterile surgical marker, the appropriate transposition arms of the Z-plasty were drawn incorporating the defect and placing the expected incisions within the relaxed skin tension lines where possible.    The area thus outlined was incised deep to adipose tissue with a #15 scalpel blade.  The skin margins were undermined to an appropriate distance in all directions utilizing iris scissors.  The opposing transposition arms were then transposed into place in opposite direction and anchored with interrupted buried subcutaneous sutures.
Transposition Flap Text: The defect edges were debeveled with a #15 scalpel blade.  Given the location of the defect and the proximity to free margins a transposition flap was deemed most appropriate.  Using a sterile surgical marker, an appropriate transposition flap was drawn incorporating the defect.    The area thus outlined was incised deep to adipose tissue with a #15 scalpel blade.  The skin margins were undermined to an appropriate distance in all directions utilizing iris scissors.
Bill 83006 For Specimen Handling/Conveyance To Laboratory?: no
Advancement Flap (Double) Text: The defect edges were debeveled with a #15 scalpel blade.  Given the location of the defect and the proximity to free margins a double advancement flap was deemed most appropriate.  Using a sterile surgical marker, the appropriate advancement flaps were drawn incorporating the defect and placing the expected incisions within the relaxed skin tension lines where possible.    The area thus outlined was incised deep to adipose tissue with a #15 scalpel blade.  The skin margins were undermined to an appropriate distance in all directions utilizing iris scissors.
Complex Repair And Xenograft Text: The defect edges were debeveled with a #15 scalpel blade.  The primary defect was closed partially with a complex linear closure.  Given the location of the defect, shape of the defect and the proximity to free margins a xenograft was deemed most appropriate to repair the remaining defect.  The graft was trimmed to fit the size of the remaining defect.  The graft was then placed in the primary defect, oriented appropriately, and sutured into place.
Complex Repair And Skin Substitute Graft Text: The defect edges were debeveled with a #15 scalpel blade.  The primary defect was closed partially with a complex linear closure.  Given the location of the remaining defect, shape of the defect and the proximity to free margins a skin substitute graft was deemed most appropriate to repair the remaining defect.  The graft was trimmed to fit the size of the remaining defect.  The graft was then placed in the primary defect, oriented appropriately, and sutured into place.
Complex Repair And Rhombic Flap Text: The defect edges were debeveled with a #15 scalpel blade.  The primary defect was closed partially with a complex linear closure.  Given the location of the remaining defect, shape of the defect and the proximity to free margins a rhombic flap was deemed most appropriate for complete closure of the defect.  Using a sterile surgical marker, an appropriate advancement flap was drawn incorporating the defect and placing the expected incisions within the relaxed skin tension lines where possible.    The area thus outlined was incised deep to adipose tissue with a #15 scalpel blade.  The skin margins were undermined to an appropriate distance in all directions utilizing iris scissors.
Complex Repair And Dorsal Nasal Flap Text: The defect edges were debeveled with a #15 scalpel blade.  The primary defect was closed partially with a complex linear closure.  Given the location of the remaining defect, shape of the defect and the proximity to free margins a dorsal nasal flap was deemed most appropriate for complete closure of the defect.  Using a sterile surgical marker, an appropriate flap was drawn incorporating the defect and placing the expected incisions within the relaxed skin tension lines where possible.    The area thus outlined was incised deep to adipose tissue with a #15 scalpel blade.  The skin margins were undermined to an appropriate distance in all directions utilizing iris scissors.
Cartilage Graft Text: The defect edges were debeveled with a #15 scalpel blade.  Given the location of the defect, shape of the defect, the fact the defect involved a full thickness cartilage defect a cartilage graft was deemed most appropriate.  An appropriate donor site was identified, cleansed, and anesthetized. The cartilage graft was then harvested and transferred to the recipient site, oriented appropriately and then sutured into place.  The secondary defect was then repaired using a primary closure.
Perilesional Excision Additional Text (Leave Blank If You Do Not Want): The margin was drawn around the clinically apparent lesion. Incisions were then made along these lines to the appropriate tissue plane and the lesion was extirpated.
Epidermal Autograft Text: The defect edges were debeveled with a #15 scalpel blade.  Given the location of the defect, shape of the defect and the proximity to free margins an epidermal autograft was deemed most appropriate.  Using a sterile surgical marker, the primary defect shape was transferred to the donor site. The epidermal graft was then harvested.  The skin graft was then placed in the primary defect and oriented appropriately.
Melolabial Interpolation Flap Text: A decision was made to reconstruct the defect utilizing an interpolation axial flap and a staged reconstruction.  A telfa template was made of the defect.  This telfa template was then used to outline the melolabial interpolation flap.  The donor area for the pedicle flap was then injected with anesthesia.  The flap was excised through the skin and subcutaneous tissue down to the layer of the underlying musculature.  The pedicle flap was carefully excised within this deep plane to maintain its blood supply.  The edges of the donor site were undermined.   The donor site was closed in a primary fashion.  The pedicle was then rotated into position and sutured.  Once the tube was sutured into place, adequate blood supply was confirmed with blanching and refill.  The pedicle was then wrapped with xeroform gauze and dressed appropriately with a telfa and gauze bandage to ensure continued blood supply and protect the attached pedicle.
Complex Repair And Double M Plasty Text: The defect edges were debeveled with a #15 scalpel blade.  The primary defect was closed partially with a complex linear closure.  Given the location of the remaining defect, shape of the defect and the proximity to free margins a double M plasty was deemed most appropriate for complete closure of the defect.  Using a sterile surgical marker, an appropriate advancement flap was drawn incorporating the defect and placing the expected incisions within the relaxed skin tension lines where possible.    The area thus outlined was incised deep to adipose tissue with a #15 scalpel blade.  The skin margins were undermined to an appropriate distance in all directions utilizing iris scissors.
Trilobed Flap Text: The defect edges were debeveled with a #15 scalpel blade.  Given the location of the defect and the proximity to free margins a trilobed flap was deemed most appropriate.  Using a sterile surgical marker, an appropriate trilobed flap drawn around the defect.    The area thus outlined was incised deep to adipose tissue with a #15 scalpel blade.  The skin margins were undermined to an appropriate distance in all directions utilizing iris scissors.
Xenograft Text: The defect edges were debeveled with a #15 scalpel blade.  Given the location of the defect, shape of the defect and the proximity to free margins a xenograft was deemed most appropriate.  The graft was then trimmed to fit the size of the defect.  The graft was then placed in the primary defect and oriented appropriately.
Epidermal Sutures: 4-0 Ethilon
Complex Repair And Tissue Cultured Epidermal Autograft Text: The defect edges were debeveled with a #15 scalpel blade.  The primary defect was closed partially with a complex linear closure.  Given the location of the defect, shape of the defect and the proximity to free margins an tissue cultured epidermal autograft was deemed most appropriate to repair the remaining defect.  The graft was trimmed to fit the size of the remaining defect.  The graft was then placed in the primary defect, oriented appropriately, and sutured into place.
H Plasty Text: Given the location of the defect, shape of the defect and the proximity to free margins a H-plasty was deemed most appropriate for repair.  Using a sterile surgical marker, the appropriate advancement arms of the H-plasty were drawn incorporating the defect and placing the expected incisions within the relaxed skin tension lines where possible. The area thus outlined was incised deep to adipose tissue with a #15 scalpel blade. The skin margins were undermined to an appropriate distance in all directions utilizing iris scissors.  The opposing advancement arms were then advanced into place in opposite direction and anchored with interrupted buried subcutaneous sutures.
Advancement Flap (Single) Text: The defect edges were debeveled with a #15 scalpel blade.  Given the location of the defect and the proximity to free margins a single advancement flap was deemed most appropriate.  Using a sterile surgical marker, an appropriate advancement flap was drawn incorporating the defect and placing the expected incisions within the relaxed skin tension lines where possible.    The area thus outlined was incised deep to adipose tissue with a #15 scalpel blade.  The skin margins were undermined to an appropriate distance in all directions utilizing iris scissors.
Excision Depth: adipose tissue
Size Of Margin In Cm: 0.3
Mastoid Interpolation Flap Text: A decision was made to reconstruct the defect utilizing an interpolation axial flap and a staged reconstruction.  A telfa template was made of the defect.  This telfa template was then used to outline the mastoid interpolation flap.  The donor area for the pedicle flap was then injected with anesthesia.  The flap was excised through the skin and subcutaneous tissue down to the layer of the underlying musculature.  The pedicle flap was carefully excised within this deep plane to maintain its blood supply.  The edges of the donor site were undermined.   The donor site was closed in a primary fashion.  The pedicle was then rotated into position and sutured.  Once the tube was sutured into place, adequate blood supply was confirmed with blanching and refill.  The pedicle was then wrapped with xeroform gauze and dressed appropriately with a telfa and gauze bandage to ensure continued blood supply and protect the attached pedicle.
Interpolation Flap Text: A decision was made to reconstruct the defect utilizing an interpolation axial flap and a staged reconstruction.  A telfa template was made of the defect.  This telfa template was then used to outline the interpolation flap.  The donor area for the pedicle flap was then injected with anesthesia.  The flap was excised through the skin and subcutaneous tissue down to the layer of the underlying musculature.  The interpolation flap was carefully excised within this deep plane to maintain its blood supply.  The edges of the donor site were undermined.   The donor site was closed in a primary fashion.  The pedicle was then rotated into position and sutured.  Once the tube was sutured into place, adequate blood supply was confirmed with blanching and refill.  The pedicle was then wrapped with xeroform gauze and dressed appropriately with a telfa and gauze bandage to ensure continued blood supply and protect the attached pedicle.
Cheek-To-Nose Interpolation Flap Text: A decision was made to reconstruct the defect utilizing an interpolation axial flap and a staged reconstruction.  A telfa template was made of the defect.  This telfa template was then used to outline the Cheek-To-Nose Interpolation flap.  The donor area for the pedicle flap was then injected with anesthesia.  The flap was excised through the skin and subcutaneous tissue down to the layer of the underlying musculature.  The interpolation flap was carefully excised within this deep plane to maintain its blood supply.  The edges of the donor site were undermined.   The donor site was closed in a primary fashion.  The pedicle was then rotated into position and sutured.  Once the tube was sutured into place, adequate blood supply was confirmed with blanching and refill.  The pedicle was then wrapped with xeroform gauze and dressed appropriately with a telfa and gauze bandage to ensure continued blood supply and protect the attached pedicle.
Pre-Excision Curettage Text (Leave Blank If You Do Not Want): Prior to drawing the surgical margin the visible lesion was removed with electrodesiccation and curettage to clearly define the lesion size.
Repair Type: None
Partial Purse String (Intermediate) Text: Given the location of the defect and the characteristics of the surrounding skin an intermediate purse string closure was deemed most appropriate.  Undermining was performed circumferentially around the surgical defect.  A purse string suture was then placed and tightened. Wound tension of the circular defect prevented complete closure of the wound.
Complex Repair And Rotation Flap Text: The defect edges were debeveled with a #15 scalpel blade.  The primary defect was closed partially with a complex linear closure.  Given the location of the remaining defect, shape of the defect and the proximity to free margins a rotation flap was deemed most appropriate for complete closure of the defect.  Using a sterile surgical marker, an appropriate advancement flap was drawn incorporating the defect and placing the expected incisions within the relaxed skin tension lines where possible.    The area thus outlined was incised deep to adipose tissue with a #15 scalpel blade.  The skin margins were undermined to an appropriate distance in all directions utilizing iris scissors.
O-T Plasty Text: The defect edges were debeveled with a #15 scalpel blade.  Given the location of the defect, shape of the defect and the proximity to free margins an O-T plasty was deemed most appropriate.  Using a sterile surgical marker, an appropriate O-T plasty was drawn incorporating the defect and placing the expected incisions within the relaxed skin tension lines where possible.    The area thus outlined was incised deep to adipose tissue with a #15 scalpel blade.  The skin margins were undermined to an appropriate distance in all directions utilizing iris scissors.
V-Y Flap Text: The defect edges were debeveled with a #15 scalpel blade.  Given the location of the defect, shape of the defect and the proximity to free margins a V-Y flap was deemed most appropriate.  Using a sterile surgical marker, an appropriate advancement flap was drawn incorporating the defect and placing the expected incisions within the relaxed skin tension lines where possible.    The area thus outlined was incised deep to adipose tissue with a #15 scalpel blade.  The skin margins were undermined to an appropriate distance in all directions utilizing iris scissors.
Complex Repair And Dermal Autograft Text: The defect edges were debeveled with a #15 scalpel blade.  The primary defect was closed partially with a complex linear closure.  Given the location of the defect, shape of the defect and the proximity to free margins an dermal autograft was deemed most appropriate to repair the remaining defect.  The graft was trimmed to fit the size of the remaining defect.  The graft was then placed in the primary defect, oriented appropriately, and sutured into place.
Tissue Cultured Epidermal Autograft Text: The defect edges were debeveled with a #15 scalpel blade.  Given the location of the defect, shape of the defect and the proximity to free margins a tissue cultured epidermal autograft was deemed most appropriate.  The graft was then trimmed to fit the size of the defect.  The graft was then placed in the primary defect and oriented appropriately.
Complex Repair And Ftsg Text: The defect edges were debeveled with a #15 scalpel blade.  The primary defect was closed partially with a complex linear closure.  Given the location of the defect, shape of the defect and the proximity to free margins a full thickness skin graft was deemed most appropriate to repair the remaining defect.  The graft was trimmed to fit the size of the remaining defect.  The graft was then placed in the primary defect, oriented appropriately, and sutured into place.
Complex Repair And Melolabial Flap Text: The defect edges were debeveled with a #15 scalpel blade.  The primary defect was closed partially with a complex linear closure.  Given the location of the remaining defect, shape of the defect and the proximity to free margins a melolabial flap was deemed most appropriate for complete closure of the defect.  Using a sterile surgical marker, an appropriate advancement flap was drawn incorporating the defect and placing the expected incisions within the relaxed skin tension lines where possible.    The area thus outlined was incised deep to adipose tissue with a #15 scalpel blade.  The skin margins were undermined to an appropriate distance in all directions utilizing iris scissors.
Saucerization Excision Additional Text (Leave Blank If You Do Not Want): The margin was drawn around the clinically apparent lesion.  Incisions were then made along these lines, in a tangential fashion, to the appropriate tissue plane and the lesion was extirpated.
Slit Excision Additional Text (Leave Blank If You Do Not Want): A linear line was drawn on the skin overlying the lesion. An incision was made slowly until the lesion was visualized.  Once visualized, the lesion was removed with blunt dissection.
Graft Donor Site Bandage (Optional-Leave Blank If You Don't Want In Note): Steri-strips and a pressure bandage were applied to the donor site.
Complex Repair And Bilobe Flap Text: The defect edges were debeveled with a #15 scalpel blade.  The primary defect was closed partially with a complex linear closure.  Given the location of the remaining defect, shape of the defect and the proximity to free margins a bilobe flap was deemed most appropriate for complete closure of the defect.  Using a sterile surgical marker, an appropriate advancement flap was drawn incorporating the defect and placing the expected incisions within the relaxed skin tension lines where possible.    The area thus outlined was incised deep to adipose tissue with a #15 scalpel blade.  The skin margins were undermined to an appropriate distance in all directions utilizing iris scissors.
Complex Repair And Modified Advancement Flap Text: The defect edges were debeveled with a #15 scalpel blade.  The primary defect was closed partially with a complex linear closure.  Given the location of the remaining defect, shape of the defect and the proximity to free margins a modified advancement flap was deemed most appropriate for complete closure of the defect.  Using a sterile surgical marker, an appropriate advancement flap was drawn incorporating the defect and placing the expected incisions within the relaxed skin tension lines where possible.    The area thus outlined was incised deep to adipose tissue with a #15 scalpel blade.  The skin margins were undermined to an appropriate distance in all directions utilizing iris scissors.
V-Y Plasty Text: The defect edges were debeveled with a #15 scalpel blade.  Given the location of the defect, shape of the defect and the proximity to free margins an V-Y advancement flap was deemed most appropriate.  Using a sterile surgical marker, an appropriate advancement flap was drawn incorporating the defect and placing the expected incisions within the relaxed skin tension lines where possible.    The area thus outlined was incised deep to adipose tissue with a #15 scalpel blade.  The skin margins were undermined to an appropriate distance in all directions utilizing iris scissors.
Size Of Lesion In Cm: 0.6
Skin Substitute Text: The defect edges were debeveled with a #15 scalpel blade.  Given the location of the defect, shape of the defect and the proximity to free margins a skin substitute graft was deemed most appropriate.  The graft material was trimmed to fit the size of the defect. The graft was then placed in the primary defect and oriented appropriately.
Wound Care: Petrolatum
Dorsal Nasal Flap Text: The defect edges were debeveled with a #15 scalpel blade.  Given the location of the defect and the proximity to free margins a dorsal nasal flap was deemed most appropriate.  Using a sterile surgical marker, an appropriate dorsal nasal flap was drawn around the defect.    The area thus outlined was incised deep to adipose tissue with a #15 scalpel blade.  The skin margins were undermined to an appropriate distance in all directions utilizing iris scissors.
Bi-Rhombic Flap Text: The defect edges were debeveled with a #15 scalpel blade.  Given the location of the defect and the proximity to free margins a bi-rhombic flap was deemed most appropriate.  Using a sterile surgical marker, an appropriate rhombic flap was drawn incorporating the defect. The area thus outlined was incised deep to adipose tissue with a #15 scalpel blade.  The skin margins were undermined to an appropriate distance in all directions utilizing iris scissors.
Bilobed Flap Text: The defect edges were debeveled with a #15 scalpel blade.  Given the location of the defect and the proximity to free margins a bilobe flap was deemed most appropriate.  Using a sterile surgical marker, an appropriate bilobe flap drawn around the defect.    The area thus outlined was incised deep to adipose tissue with a #15 scalpel blade.  The skin margins were undermined to an appropriate distance in all directions utilizing iris scissors.
Partial Purse String (Simple) Text: Given the location of the defect and the characteristics of the surrounding skin a simple purse string closure was deemed most appropriate.  Undermining was performed circumferentially around the surgical defect.  A purse string suture was then placed and tightened. Wound tension of the circular defect prevented complete closure of the wound.
Split-Thickness Skin Graft Text: The defect edges were debeveled with a #15 scalpel blade.  Given the location of the defect, shape of the defect and the proximity to free margins a split thickness skin graft was deemed most appropriate.  Using a sterile surgical marker, the primary defect shape was transferred to the donor site. The split thickness graft was then harvested.  The skin graft was then placed in the primary defect and oriented appropriately.
Repair Performed By Another Provider Text (Leave Blank If You Do Not Want): After the tissue was excised the defect was repaired by another provider.
Estimated Blood Loss (Cc): minimal
Complex Repair And Double Advancement Flap Text: The defect edges were debeveled with a #15 scalpel blade.  The primary defect was closed partially with a complex linear closure.  Given the location of the remaining defect, shape of the defect and the proximity to free margins a double advancement flap was deemed most appropriate for complete closure of the defect.  Using a sterile surgical marker, an appropriate advancement flap was drawn incorporating the defect and placing the expected incisions within the relaxed skin tension lines where possible.    The area thus outlined was incised deep to adipose tissue with a #15 scalpel blade.  The skin margins were undermined to an appropriate distance in all directions utilizing iris scissors.
Advancement-Rotation Flap Text: The defect edges were debeveled with a #15 scalpel blade.  Given the location of the defect, shape of the defect and the proximity to free margins an advancement-rotation flap was deemed most appropriate.  Using a sterile surgical marker, an appropriate flap was drawn incorporating the defect and placing the expected incisions within the relaxed skin tension lines where possible. The area thus outlined was incised deep to adipose tissue with a #15 scalpel blade.  The skin margins were undermined to an appropriate distance in all directions utilizing iris scissors.
Melolabial Transposition Flap Text: The defect edges were debeveled with a #15 scalpel blade.  Given the location of the defect and the proximity to free margins a melolabial flap was deemed most appropriate.  Using a sterile surgical marker, an appropriate melolabial transposition flap was drawn incorporating the defect.    The area thus outlined was incised deep to adipose tissue with a #15 scalpel blade.  The skin margins were undermined to an appropriate distance in all directions utilizing iris scissors.
Posterior Auricular Interpolation Flap Text: A decision was made to reconstruct the defect utilizing an interpolation axial flap and a staged reconstruction.  A telfa template was made of the defect.  This telfa template was then used to outline the posterior auricular interpolation flap.  The donor area for the pedicle flap was then injected with anesthesia.  The flap was excised through the skin and subcutaneous tissue down to the layer of the underlying musculature.  The pedicle flap was carefully excised within this deep plane to maintain its blood supply.  The edges of the donor site were undermined.   The donor site was closed in a primary fashion.  The pedicle was then rotated into position and sutured.  Once the tube was sutured into place, adequate blood supply was confirmed with blanching and refill.  The pedicle was then wrapped with xeroform gauze and dressed appropriately with a telfa and gauze bandage to ensure continued blood supply and protect the attached pedicle.
A-T Advancement Flap Text: The defect edges were debeveled with a #15 scalpel blade.  Given the location of the defect, shape of the defect and the proximity to free margins an A-T advancement flap was deemed most appropriate.  Using a sterile surgical marker, an appropriate advancement flap was drawn incorporating the defect and placing the expected incisions within the relaxed skin tension lines where possible.    The area thus outlined was incised deep to adipose tissue with a #15 scalpel blade.  The skin margins were undermined to an appropriate distance in all directions utilizing iris scissors.
Complex Repair And Transposition Flap Text: The defect edges were debeveled with a #15 scalpel blade.  The primary defect was closed partially with a complex linear closure.  Given the location of the remaining defect, shape of the defect and the proximity to free margins a transposition flap was deemed most appropriate for complete closure of the defect.  Using a sterile surgical marker, an appropriate advancement flap was drawn incorporating the defect and placing the expected incisions within the relaxed skin tension lines where possible.    The area thus outlined was incised deep to adipose tissue with a #15 scalpel blade.  The skin margins were undermined to an appropriate distance in all directions utilizing iris scissors.
Positioning (Leave Blank If You Do Not Want): The patient was placed in a comfortable position exposing the surgical site.
Complex Repair And Split-Thickness Skin Graft Text: The defect edges were debeveled with a #15 scalpel blade.  The primary defect was closed partially with a complex linear closure.  Given the location of the defect, shape of the defect and the proximity to free margins a split thickness skin graft was deemed most appropriate to repair the remaining defect.  The graft was trimmed to fit the size of the remaining defect.  The graft was then placed in the primary defect, oriented appropriately, and sutured into place.
Consent was obtained from the patient. The risks and benefits to therapy were discussed in detail. Specifically, the risks of infection, scarring, bleeding, prolonged wound healing, incomplete removal, allergy to anesthesia, nerve injury and recurrence were addressed. Prior to the procedure, the treatment site was clearly identified and confirmed by the patient. All components of Universal Protocol/PAUSE Rule completed.
Hatchet Flap Text: The defect edges were debeveled with a #15 scalpel blade.  Given the location of the defect, shape of the defect and the proximity to free margins a hatchet flap was deemed most appropriate.  Using a sterile surgical marker, an appropriate hatchet flap was drawn incorporating the defect and placing the expected incisions within the relaxed skin tension lines where possible.    The area thus outlined was incised deep to adipose tissue with a #15 scalpel blade.  The skin margins were undermined to an appropriate distance in all directions utilizing iris scissors.
Island Pedicle Flap Text: The defect edges were debeveled with a #15 scalpel blade.  Given the location of the defect, shape of the defect and the proximity to free margins an island pedicle advancement flap was deemed most appropriate.  Using a sterile surgical marker, an appropriate advancement flap was drawn incorporating the defect, outlining the appropriate donor tissue and placing the expected incisions within the relaxed skin tension lines where possible.    The area thus outlined was incised deep to adipose tissue with a #15 scalpel blade.  The skin margins were undermined to an appropriate distance in all directions around the primary defect and laterally outward around the island pedicle utilizing iris scissors.  There was minimal undermining beneath the pedicle flap.
Complex Repair And Single Advancement Flap Text: The defect edges were debeveled with a #15 scalpel blade.  The primary defect was closed partially with a complex linear closure.  Given the location of the remaining defect, shape of the defect and the proximity to free margins a single advancement flap was deemed most appropriate for complete closure of the defect.  Using a sterile surgical marker, an appropriate advancement flap was drawn incorporating the defect and placing the expected incisions within the relaxed skin tension lines where possible.    The area thus outlined was incised deep to adipose tissue with a #15 scalpel blade.  The skin margins were undermined to an appropriate distance in all directions utilizing iris scissors.
Ear Star Wedge Flap Text: The defect edges were debeveled with a #15 blade scalpel.  Given the location of the defect and the proximity to free margins (helical rim) an ear star wedge flap was deemed most appropriate.  Using a sterile surgical marker, the appropriate flap was drawn incorporating the defect and placing the expected incisions between the helical rim and antihelix where possible.  The area thus outlined was incised through and through with a #15 scalpel blade.
Double Island Pedicle Flap Text: The defect edges were debeveled with a #15 scalpel blade.  Given the location of the defect, shape of the defect and the proximity to free margins a double island pedicle advancement flap was deemed most appropriate.  Using a sterile surgical marker, an appropriate advancement flap was drawn incorporating the defect, outlining the appropriate donor tissue and placing the expected incisions within the relaxed skin tension lines where possible.    The area thus outlined was incised deep to adipose tissue with a #15 scalpel blade.  The skin margins were undermined to an appropriate distance in all directions around the primary defect and laterally outward around the island pedicle utilizing iris scissors.  There was minimal undermining beneath the pedicle flap.
Island Pedicle Flap-Requiring Vessel Identification Text: The defect edges were debeveled with a #15 scalpel blade.  Given the location of the defect, shape of the defect and the proximity to free margins an island pedicle advancement flap was deemed most appropriate.  Using a sterile surgical marker, an appropriate advancement flap was drawn, based on the axial vessel mentioned above, incorporating the defect, outlining the appropriate donor tissue and placing the expected incisions within the relaxed skin tension lines where possible.    The area thus outlined was incised deep to adipose tissue with a #15 scalpel blade.  The skin margins were undermined to an appropriate distance in all directions around the primary defect and laterally outward around the island pedicle utilizing iris scissors.  There was minimal undermining beneath the pedicle flap.
Rhombic Flap Text: The defect edges were debeveled with a #15 scalpel blade.  Given the location of the defect and the proximity to free margins a rhombic flap was deemed most appropriate.  Using a sterile surgical marker, an appropriate rhombic flap was drawn incorporating the defect.    The area thus outlined was incised deep to adipose tissue with a #15 scalpel blade.  The skin margins were undermined to an appropriate distance in all directions utilizing iris scissors.
Lab Facility: 
Excisional Biopsy Additional Text (Leave Blank If You Do Not Want): The margin was drawn around the clinically apparent lesion. An elliptical shape was then drawn on the skin incorporating the lesion and margins.  Incisions were then made along these lines to the appropriate tissue plane and the lesion was extirpated.
Complex Repair And O-T Advancement Flap Text: The defect edges were debeveled with a #15 scalpel blade.  The primary defect was closed partially with a complex linear closure.  Given the location of the remaining defect, shape of the defect and the proximity to free margins an O-T advancement flap was deemed most appropriate for complete closure of the defect.  Using a sterile surgical marker, an appropriate advancement flap was drawn incorporating the defect and placing the expected incisions within the relaxed skin tension lines where possible.    The area thus outlined was incised deep to adipose tissue with a #15 scalpel blade.  The skin margins were undermined to an appropriate distance in all directions utilizing iris scissors.
O-Z Plasty Text: The defect edges were debeveled with a #15 scalpel blade.  Given the location of the defect, shape of the defect and the proximity to free margins an O-Z plasty (double transposition flap) was deemed most appropriate.  Using a sterile surgical marker, the appropriate transposition flaps were drawn incorporating the defect and placing the expected incisions within the relaxed skin tension lines where possible.    The area thus outlined was incised deep to adipose tissue with a #15 scalpel blade.  The skin margins were undermined to an appropriate distance in all directions utilizing iris scissors.  Hemostasis was achieved with electrocautery.  The flaps were then transposed into place, one clockwise and the other counterclockwise, and anchored with interrupted buried subcutaneous sutures.
Home Suture Removal Text: Patient was provided a home suture removal kit and will remove their sutures at home.  If they have any questions or difficulties they will call the office.
Complex Repair And Epidermal Autograft Text: The defect edges were debeveled with a #15 scalpel blade.  The primary defect was closed partially with a complex linear closure.  Given the location of the defect, shape of the defect and the proximity to free margins an epidermal autograft was deemed most appropriate to repair the remaining defect.  The graft was trimmed to fit the size of the remaining defect.  The graft was then placed in the primary defect, oriented appropriately, and sutured into place.
Lab: [id number]
Deep Sutures: 5-0 Vicryl
Complex Repair And M Plasty Text: The defect edges were debeveled with a #15 scalpel blade.  The primary defect was closed partially with a complex linear closure.  Given the location of the remaining defect, shape of the defect and the proximity to free margins an M plasty was deemed most appropriate for complete closure of the defect.  Using a sterile surgical marker, an appropriate advancement flap was drawn incorporating the defect and placing the expected incisions within the relaxed skin tension lines where possible.    The area thus outlined was incised deep to adipose tissue with a #15 scalpel blade.  The skin margins were undermined to an appropriate distance in all directions utilizing iris scissors.
Modified Advancement Flap Text: The defect edges were debeveled with a #15 scalpel blade.  Given the location of the defect, shape of the defect and the proximity to free margins a modified advancement flap was deemed most appropriate.  Using a sterile surgical marker, an appropriate advancement flap was drawn incorporating the defect and placing the expected incisions within the relaxed skin tension lines where possible.    The area thus outlined was incised deep to adipose tissue with a #15 scalpel blade.  The skin margins were undermined to an appropriate distance in all directions utilizing iris scissors.
Intermediate Repair Preamble Text (Leave Blank If You Do Not Want): Undermining was performed with blunt dissection.
Fusiform Excision Additional Text (Leave Blank If You Do Not Want): The margin was drawn around the clinically apparent lesion.  A fusiform shape was then drawn on the skin incorporating the lesion and margins.  Incisions were then made along these lines to the appropriate tissue plane and the lesion was extirpated.
Ftsg Text: The defect edges were debeveled with a #15 scalpel blade.  Given the location of the defect, shape of the defect and the proximity to free margins a full thickness skin graft was deemed most appropriate.  Using a sterile surgical marker, the primary defect shape was transferred to the donor site. The area thus outlined was incised deep to adipose tissue with a #15 scalpel blade.  The harvested graft was then trimmed of adipose tissue until only dermis and epidermis was left.  The skin margins of the secondary defect were undermined to an appropriate distance in all directions utilizing iris scissors.  The secondary defect was closed with interrupted buried subcutaneous sutures.  The skin edges were then re-apposed with running  sutures.  The skin graft was then placed in the primary defect and oriented appropriately.
Anesthesia Volume In Cc: 9
No Repair - Repaired With Adjacent Surgical Defect Text (Leave Blank If You Do Not Want): After the excision the defect was repaired concurrently with another surgical defect which was in close approximation.
Helical Rim Advancement Flap Text: The defect edges were debeveled with a #15 blade scalpel.  Given the location of the defect and the proximity to free margins (helical rim) a double helical rim advancement flap was deemed most appropriate.  Using a sterile surgical marker, the appropriate advancement flaps were drawn incorporating the defect and placing the expected incisions between the helical rim and antihelix where possible.  The area thus outlined was incised through and through with a #15 scalpel blade.  With a skin hook and iris scissors, the flaps were gently and sharply undermined and freed up.
Rotation Flap Text: The defect edges were debeveled with a #15 scalpel blade.  Given the location of the defect, shape of the defect and the proximity to free margins a rotation flap was deemed most appropriate.  Using a sterile surgical marker, an appropriate rotation flap was drawn incorporating the defect and placing the expected incisions within the relaxed skin tension lines where possible.    The area thus outlined was incised deep to adipose tissue with a #15 scalpel blade.  The skin margins were undermined to an appropriate distance in all directions utilizing iris scissors.
Island Pedicle Flap With Canthal Suspension Text: The defect edges were debeveled with a #15 scalpel blade.  Given the location of the defect, shape of the defect and the proximity to free margins an island pedicle advancement flap was deemed most appropriate.  Using a sterile surgical marker, an appropriate advancement flap was drawn incorporating the defect, outlining the appropriate donor tissue and placing the expected incisions within the relaxed skin tension lines where possible. The area thus outlined was incised deep to adipose tissue with a #15 scalpel blade.  The skin margins were undermined to an appropriate distance in all directions around the primary defect and laterally outward around the island pedicle utilizing iris scissors.  There was minimal undermining beneath the pedicle flap. A suspension suture was placed in the canthal tendon to prevent tension and prevent ectropion.
Complex Repair And A-T Advancement Flap Text: The defect edges were debeveled with a #15 scalpel blade.  The primary defect was closed partially with a complex linear closure.  Given the location of the remaining defect, shape of the defect and the proximity to free margins an A-T advancement flap was deemed most appropriate for complete closure of the defect.  Using a sterile surgical marker, an appropriate advancement flap was drawn incorporating the defect and placing the expected incisions within the relaxed skin tension lines where possible.    The area thus outlined was incised deep to adipose tissue with a #15 scalpel blade.  The skin margins were undermined to an appropriate distance in all directions utilizing iris scissors.
S Plasty Text: Given the location and shape of the defect, and the orientation of relaxed skin tension lines, an S-plasty was deemed most appropriate for repair.  Using a sterile surgical marker, the appropriate outline of the S-plasty was drawn, incorporating the defect and placing the expected incisions within the relaxed skin tension lines where possible.  The area thus outlined was incised deep to adipose tissue with a #15 scalpel blade.  The skin margins were undermined to an appropriate distance in all directions utilizing iris scissors. The skin flaps were advanced over the defect.  The opposing margins were then approximated with interrupted buried subcutaneous sutures.
Bilateral Helical Rim Advancement Flap Text: The defect edges were debeveled with a #15 blade scalpel.  Given the location of the defect and the proximity to free margins (helical rim) a bilateral helical rim advancement flap was deemed most appropriate.  Using a sterile surgical marker, the appropriate advancement flaps were drawn incorporating the defect and placing the expected incisions between the helical rim and antihelix where possible.  The area thus outlined was incised through and through with a #15 scalpel blade.  With a skin hook and iris scissors, the flaps were gently and sharply undermined and freed up.
Purse String (Intermediate) Text: Given the location of the defect and the characteristics of the surrounding skin a purse string intermediate closure was deemed most appropriate.  Undermining was performed circumfirentially around the surgical defect.  A purse string suture was then placed and tightened.
Complex Repair And V-Y Plasty Text: The defect edges were debeveled with a #15 scalpel blade.  The primary defect was closed partially with a complex linear closure.  Given the location of the remaining defect, shape of the defect and the proximity to free margins a V-Y plasty was deemed most appropriate for complete closure of the defect.  Using a sterile surgical marker, an appropriate advancement flap was drawn incorporating the defect and placing the expected incisions within the relaxed skin tension lines where possible.    The area thus outlined was incised deep to adipose tissue with a #15 scalpel blade.  The skin margins were undermined to an appropriate distance in all directions utilizing iris scissors.
W Plasty Text: The lesion was extirpated to the level of the fat with a #15 scalpel blade.  Given the location of the defect, shape of the defect and the proximity to free margins a W-plasty was deemed most appropriate for repair.  Using a sterile surgical marker, the appropriate transposition arms of the W-plasty were drawn incorporating the defect and placing the expected incisions within the relaxed skin tension lines where possible.    The area thus outlined was incised deep to adipose tissue with a #15 scalpel blade.  The skin margins were undermined to an appropriate distance in all directions utilizing iris scissors.  The opposing transposition arms were then transposed into place in opposite direction and anchored with interrupted buried subcutaneous sutures.
Crescentic Advancement Flap Text: The defect edges were debeveled with a #15 scalpel blade.  Given the location of the defect and the proximity to free margins a crescentic advancement flap was deemed most appropriate.  Using a sterile surgical marker, the appropriate advancement flap was drawn incorporating the defect and placing the expected incisions within the relaxed skin tension lines where possible.    The area thus outlined was incised deep to adipose tissue with a #15 scalpel blade.  The skin margins were undermined to an appropriate distance in all directions utilizing iris scissors.
Billing Type: Third-Party Bill
Dermal Autograft Text: The defect edges were debeveled with a #15 scalpel blade.  Given the location of the defect, shape of the defect and the proximity to free margins a dermal autograft was deemed most appropriate.  Using a sterile surgical marker, the primary defect shape was transferred to the donor site. The area thus outlined was incised deep to adipose tissue with a #15 scalpel blade.  The harvested graft was then trimmed of adipose and epidermal tissue until only dermis was left.  The skin graft was then placed in the primary defect and oriented appropriately.
Dressing: dry sterile dressing
Complex Repair And O-L Flap Text: The defect edges were debeveled with a #15 scalpel blade.  The primary defect was closed partially with a complex linear closure.  Given the location of the remaining defect, shape of the defect and the proximity to free margins an O-L flap was deemed most appropriate for complete closure of the defect.  Using a sterile surgical marker, an appropriate flap was drawn incorporating the defect and placing the expected incisions within the relaxed skin tension lines where possible.    The area thus outlined was incised deep to adipose tissue with a #15 scalpel blade.  The skin margins were undermined to an appropriate distance in all directions utilizing iris scissors.
Muscle Hinge Flap Text: The defect edges were debeveled with a #15 scalpel blade.  Given the size, depth and location of the defect and the proximity to free margins a muscle hinge flap was deemed most appropriate.  Using a sterile surgical marker, an appropriate hinge flap was drawn incorporating the defect. The area thus outlined was incised with a #15 scalpel blade.  The skin margins were undermined to an appropriate distance in all directions utilizing iris scissors.
Paramedian Forehead Flap Text: A decision was made to reconstruct the defect utilizing an interpolation axial flap and a staged reconstruction.  A telfa template was made of the defect.  This telfa template was then used to outline the paramedian forehead pedicle flap.  The donor area for the pedicle flap was then injected with anesthesia.  The flap was excised through the skin and subcutaneous tissue down to the layer of the underlying musculature.  The pedicle flap was carefully excised within this deep plane to maintain its blood supply.  The edges of the donor site were undermined.   The donor site was closed in a primary fashion.  The pedicle was then rotated into position and sutured.  Once the tube was sutured into place, adequate blood supply was confirmed with blanching and refill.  The pedicle was then wrapped with xeroform gauze and dressed appropriately with a telfa and gauze bandage to ensure continued blood supply and protect the attached pedicle.
Alar Island Pedicle Flap Text: The defect edges were debeveled with a #15 scalpel blade.  Given the location of the defect, shape of the defect and the proximity to the alar rim an island pedicle advancement flap was deemed most appropriate.  Using a sterile surgical marker, an appropriate advancement flap was drawn incorporating the defect, outlining the appropriate donor tissue and placing the expected incisions within the nasal ala running parallel to the alar rim. The area thus outlined was incised with a #15 scalpel blade.  The skin margins were undermined minimally to an appropriate distance in all directions around the primary defect and laterally outward around the island pedicle utilizing iris scissors.  There was minimal undermining beneath the pedicle flap.
Scalpel Size: 15 blade
Curvilinear Excision Additional Text (Leave Blank If You Do Not Want): The margin was drawn around the clinically apparent lesion.  A curvilinear shape was then drawn on the skin incorporating the lesion and margins.  Incisions were then made along these lines to the appropriate tissue plane and the lesion was extirpated.

## 2018-01-08 PROBLEM — J98.4 RESTRICTIVE LUNG DISEASE: Status: ACTIVE | Noted: 2018-01-01

## 2018-01-08 NOTE — PROGRESS NOTES
CC:  Here for f/u sleep and pulmonary issues as listed below    HPI:   Abbe presents today for follow up for emphysema and BRITTANY. PFTs from 5/2017 have mild declined since 2015 with no change in the TLC and indicate a Fev1 of 1.79L or 46% predicted without bronchodilator response, Fev1/FVC ratio of 97, TLC 54%, DLCO 39%. He is never a smoker. CAT scan from 12/2015 showed trace loculated pleural effusion and bilateral subpleural bands of sub-segmental atelectasis and/or scarring, which could be related to his restrictive changes found on PFTs per Dr. Cevallos.   5+ years with smoke exposure. Denies birds, mold, hot tub in home, autoimmune, cancer agents.      Patient was trialed on Anoro, thought beneficial, stopped due to cost and interaction with lasix. He tried Breo, but had frequent thrush. Restarted Anoro with benefit and now uses Xopenex 3-4x/week.  He continues to have nasal congestion and cough with white mucus, from allergies; wheezing.  I concur to use nasal steroid spray. He continues to use 24/7 oxygen at 3L with benefit.  He denies hemoptysis, chest pain chest tightness, fevers or chills, acid reflux, morning headaches.     PSG from 5/2017 indicated an AHI of 33.9 and low oxygen of 76%.  Currently he is being treated with autoCPAP @ [id number].  Compliance download from the dates 12/9/2017 - 1/7/2018 indicates he is wearing the device 100% for an avg of 7 hours and 38 minutes per night with a reduced AHI of 1.3. Avg pressure is 14.8 with a high of 14.9.  He does tolerate pressure and mask well.  He wakes up refreshed and is less tired throughout the day. He continues to take a nap a few times improved since CPAP machine. He denies morning H/A and sleep better overall. He continues to wake in the middle of the night d/t nocturia and only sleeps appx 3 hours before he needs to wake. He will continue to clean supplies weekly and change them as insurance allows.       Patient Active Problem List     Diagnosis Date Noted   • Restrictive lung disease 01/08/2018   • Chronic respiratory failure with hypoxia (CMS-HCC) 09/11/2017   • Chronic systolic congestive heart failure (CMS-HCC) 05/17/2017   • Chronic obstructive pulmonary disease (CMS-HCC) 05/17/2017   • Pulmonary emphysema (CMS-HCC) 04/24/2017   • BRITTANY (obstructive sleep apnea) 04/24/2017   • Hypokalemia 03/16/2015   • Atypical chest pain 03/16/2015   • Chest pain 03/14/2015   • Pleural effusion 10/29/2013   • GERD (gastroesophageal reflux disease)    • Arrhythmia    • Myocardial infarct    • Dyspnea on exertion 02/19/2013   • Coronary artery disease due to calcified coronary lesion: CABG ×5 in 2009 08/31/2011   • HTN (hypertension), benign 08/31/2011   • Dyslipidemia 08/31/2011       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 at SURGERY [facility] ORS   • INGUINAL HERNIA REPAIR  9/15/2010    Performed by GRIS GARCÍA at SURGERY [facility] ORS   • MULTIPLE CORONARY ARTERY BYPASS ENDO VEIN HARVEST  10/13/2009    Performed by JULIA ALCANTARA at SURGERY [facility] ORS   • MAZE PROCEDURE  10/13/2009    Performed by JULIA ALCANTARA at [facility] ORS   • BIOPSY  GENERAL  10/13/2009    Performed by JULIA ALCANTARA at SURGERY [facility]S   • ANGIOGRAM  2009   • CATARACT EXTRACTION      both eyes   • OTHER      right knee orthoscopic    • OTHER CARDIAC SURGERY         Family History   Problem Relation Age of Onset   • Other Mother      afib   • Heart Attack Maternal Uncle        Social History     Social History   • Marital status:      Spouse name: N/A   • Number of children: N/A   • Years of education: N/A     Occupational History   • Not on file.     Social History Main Topics   • Smoking status: Never Smoker   • Smokeless tobacco: Never Used   • Alcohol use No   • Drug use: No   • Sexual activity: Not on file     Other Topics Concern   • Not on file     Social History Narrative   • No narrative on file       Current Outpatient Prescriptions   Medication Sig Dispense Refill   • benazepril (LOTENSIN) 20 MG Tab Take 1 Tab by mouth every evening. 90 Tab 3   • rosuvastatin (CRESTOR) 40 MG tablet Take 1 Tab by mouth every day. 90 Tab 3   • furosemide (LASIX) 80 MG Tab Take 80 mg by mouth every day.  3   • potassium chloride ER (KLOR-CON) 10 MEQ tablet   3   • Cholecalciferol (VITAMIN D) 2000 UNITS Cap Take  by mouth.     • Esomeprazole Magnesium (NEXIUM 24HR PO) Take 40 mg by mouth every day.     • metolazone (ZAROXOLYN) 2.5 MG Tab Take 2.5 mg by mouth every day.     • Umeclidinium-Vilanterol (ANORO ELLIPTA) 62.5-25 MCG/INH AEROSOL POWDER, BREATH ACTIVATED Take 1 Inhalation by mouth every day. 1 Each 11   • bisoprolol (ZEBETA) 5 MG Tab Take 1 Tab by mouth every day. 30 Tab 11   • aspirin (ASA) 81 MG Chew Tab chewable tablet Take 1 Tab by mouth every day. 90 Tab 3   • tamsulosin (FLOMAX) 0.4 MG capsule Take 0.4 mg by mouth 2 Times a Day.     • XOPENEX HFA 45 MCG/ACT inhaler   1   • sulfamethoxazole-trimethoprim (BACTRIM DS) 800-160 MG tablet   0   • omeprazole (PRILOSEC) 20 MG delayed-release capsule Take 20 mg by mouth every day.     • fluconazole (DIFLUCAN) 100 MG  Tab Take 1 Tab by mouth every day. (Patient not taking: Reported on 1/8/2018) 5 Tab 0   • nitroglycerin (NITROSTAT) 0.4 MG SL Tab Place 1 Tab under tongue as needed for Chest Pain. 25 Tab 5   • lansoprazole (PREVACID) 30 MG CPDR Take 30 mg by mouth every day.       Current Facility-Administered Medications   Medication Dose Route Frequency Provider Last Rate Last Dose   • albuterol inhaler 2 Puff  2 Puff Inhalation Q4HRS PRN Julianne Almaraz, A.P.R.N.              Allergies: Prednisone; Ciprofloxacin; Dairy aid [lactase]; Gluten meal; and Latex      ROS   Gen: Denies fever, chills, unintentional weight loss, fatigue, night sweats  E/N/T: Denies ear pain, nasal congestion  Resp:Denies wheezing, cough, sputum production, hemoptysis  CV: Denies chest pain, chest tightness, palpitations, BLE edema  Sleep:Denies morning headache, insomnia, daytime somnolence, snoring, gasping for air, apnea  Neuro: Denies frequent headaches, weakness, dizziness  GI: Denies N/V, acid reflux/heartburn  See HPI.  All other systems reviewed and negative      Vital signs for this encounter:  Vitals:    01/08/18 1349   Weight: 97.5 kg (215 lb)   Weight % change since last entry.: 0 %   BP: 126/56   Pulse: 84   Resp: 16   Temp: 37.1 °C (98.8 °F)   O2 sat % on O2: 94 %   O2 Flow Rate (L/min): 2                 Physical Exam:   Appearance: well developed, well nourished, no acute distress.  Eyes: PERRL, EOM intact, sclere white, conjunctiva moist.  Ears: no lesions or deformities.  Hearing: grossly intact.  Nose: no lesions or deformities.  Dentition: good dentition.  Oropharynx: tongue normal, posterior pharynx without erythema or exudate.  Neck: supple, trachea midline, no masses.  Respiratory effort: no intercostal retractions or use of accessory muscles.  Lung auscultation: Bilateral diminished   Heart auscultation: no murmur, rub, or gallop.   Extremities: no cyanosis or edema.  Abdomen: soft, non-tender, no masses.  Gait and station:  grossly normal   Digits and Nails: no clubbing, cyanosis, petechiae, or nodes.  Cranial nerves: grossly normal.  Motor: no focal deficits observed.  Skin: no rashes, lesions, or ulcers noted.  Orientation: oriented to time, place, and person.  Mood and affect: mood and affect appropriate, normal interaction with examiner.    Assessment   1. Restrictive lung disease  CT-CHEST, HIGH RESOLUTION LUNG   2. Pulmonary emphysema, unspecified emphysema type (CMS-HCC)     3. BRITTANY (obstructive sleep apnea)  DME MASK AND SUPPLIES   4. Chronic respiratory failure with hypoxia (CMS-HCC)         Patient was seen for 30 minutes, more than 50% of time spent in face to face review, counseling, and arranging future evaluation and follow up of medical conditions and care.     PLAN:   Patient Instructions   1) Continue autoCPAP at [id number] with 3L of oxygen  2) Clean mask and supplies weekly and change them as insurance allows  3) Continue Anoro. Continue Xopenex rescue inhaler    4) Continue daily exercise   5) Vaccines: Up to date with Prevnar and Pneumococcal, flu  6) Continue daytime oxygen at 3L   7) Return for Compliance, review of symptoms, if not sooner, follow up with ALBERTO Valencia, CAT scan results.   8) HRCT for evaluation of restrictive lung

## 2018-01-08 NOTE — PATIENT INSTRUCTIONS
1) Continue autoCPAP at [id number] with 3L of oxygen  2) Clean mask and supplies weekly and change them as insurance allows  3) Continue Anoro. Continue Xopenex rescue inhaler    4) Continue daily exercise   5) Vaccines: Up to date with Prevnar and Pneumococcal, flu  6) Continue daytime oxygen at 3L   7) Return for Compliance, review of symptoms, if not sooner, follow up with ALBERTO Valencia, CAT scan results.   8) HRCT for evaluation of restrictive

## 2018-02-20 NOTE — PROGRESS NOTES
Subjective:   Abbe Whitman is a 77 y.o. male who presents today for followup of his coronary artery disease with recurrent pleural effusions, hypertension and hyperlipidemia. He did undergo right pleurodesis. He did develop a left pleural effusion which ultimately caused some chest discomfort and led to an admission at the end of March 2015.    He continues on chronic oxygen therapy 24/7. He has no dyspnea on exertion and a slow walk. He's had no PND or orthopnea but is on CPAP with oxygen therapy at night. His edema has improved somewhat. He is noted no chest discomfort, palpitations or lightheadedness.      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   • Dyslipidemia 8/31/2011   • Dyspnea on exertion 2/19/2013   • GERD (gastroesophageal reflux disease)    • Heart burn    • HTN (hypertension) 8/31/2011   • HTN (hypertension) 2013    pt states well controlled   • Hypertension    • Indigestion    • Myocardial infarct 2000   • Other specified disorder of intestines 2013    constipation needs miralax daily   • Pleural effusion 7/1/2013   • Unspecified urinary incontinence      Past Surgical History:   Procedure Laterality Date   • ANGIOGRAM  2009   • BIOPSY GENERAL  10/13/2009    Performed by JULIA ALCANTARA at SURGERY [facility] ORS   • CATARACT EXTRACTION      both eyes   • INGUINAL HERNIA REPAIR  9/15/2010    Performed by GRIS GARCÍA at SURGERY [facility] ORS   • MAZE PROCEDURE  10/13/2009    Performed by JULIA ALCANTARA at SURGERY [facility] ORS   • MULTIPLE CORONARY ARTERY BYPASS ENDO VEIN HARVEST  10/13/2009    Performed by JULIA ALCANTARA at SURGERY [facility] ORS   • OTHER      right knee orthoscopic    • OTHER CARDIAC SURGERY     • THORACOSCOPY   10/29/2013    Performed by John H Ganser, M.D. at SURGERY [facility] ORS     Family History   Problem Relation Age of Onset   • Other Mother      afib   • Heart Attack Maternal Uncle      History   Smoking Status   • Never Smoker   Smokeless Tobacco   • Never Used     Allergies   Allergen Reactions   • Prednisone      High dose caused tightness in throat. Are okay with Medrol pack per patient   • Ciprofloxacin      Per patient makes skin peel    • Dairy Aid [Lactase]      LACTOSE INTOLERANT   • Gluten Meal      BLOATED AND SICK   • Latex      Son says slight skin reaction     Outpatient Encounter Prescriptions as of 2/20/2018   Medication Sig Dispense Refill   • benazepril (LOTENSIN) 20 MG Tab Take 1 Tab by mouth every evening. 90 Tab 3   • rosuvastatin (CRESTOR) 40 MG tablet Take 1 Tab by mouth every day. 90 Tab 3   • furosemide (LASIX) 80 MG Tab Take 80 mg by mouth every day.  3   • XOPENEX HFA 45 MCG/ACT inhaler   1   • potassium chloride ER (KLOR-CON) 10 MEQ tablet   3   • Cholecalciferol (VITAMIN D) 2000 UNITS Cap Take  by mouth.     • Esomeprazole Magnesium (NEXIUM 24HR PO) Take 40 mg by mouth every day.     • metolazone (ZAROXOLYN) 2.5 MG Tab Take 2.5 mg by mouth every day.     • Umeclidinium-Vilanterol (ANORO ELLIPTA) 62.5-25 MCG/INH AEROSOL POWDER, BREATH ACTIVATED Take 1 Inhalation by mouth every day. 1 Each 11   • fluconazole (DIFLUCAN) 100 MG Tab Take 1 Tab by mouth every day. 5 Tab 0   • bisoprolol (ZEBETA) 5 MG Tab Take 1 Tab by mouth every day. 30 Tab 11   • nitroglycerin (NITROSTAT) 0.4 MG SL Tab Place 1 Tab under tongue as needed for Chest Pain. 25 Tab 5   • aspirin (ASA) 81 MG Chew Tab chewable tablet Take 1 Tab by mouth every day. 90 Tab 3   • tamsulosin (FLOMAX) 0.4 MG capsule Take 0.4 mg by mouth 2 Times a Day.     • sulfamethoxazole-trimethoprim (BACTRIM DS) 800-160 MG tablet   0   • omeprazole (PRILOSEC) 20 MG delayed-release capsule Take 20 mg by mouth every day.     • lansoprazole  "(PREVACID) 30 MG CPDR Take 30 mg by mouth every day.       Facility-Administered Encounter Medications as of 2/20/2018   Medication Dose Route Frequency Provider Last Rate Last Dose   • albuterol inhaler 2 Puff  2 Puff Inhalation Q4HRS KALE Jacinto       Objective:   /50   Pulse 80   Ht 1.93 m (6' 4\")   Wt 99.3 kg (219 lb)   SpO2 93%   BMI 26.66 kg/m²     Physical Exam   Neck: JVD present.   Cardiovascular: Normal rate and regular rhythm.  Exam reveals no gallop.    No murmur heard.  Pulmonary/Chest: Effort normal. He has no rales.   Abdominal: Soft. There is no tenderness.   Musculoskeletal: He exhibits edema (3+ pretibial).     Myocardial perfusion scan:  IMPRESSIONS  Normal left ventricular size, ejection fraction, and wall motion.  No evidence of significant jeopardized viable myocardium or prior myocardial   Infarction.  Normal left ventricular wall motion. LV ejection fraction = 53%.  Exam Date: 03/15/2015 10:37    Echo:  CONCLUSIONS  Technically difficult study.   Mildly reduced left ventricular systolic function.  Left ventricular ejection fraction is 45% to 50%.  Mild mitral regurgitation.  Moderate tricuspid regurgitation.  Right ventricular systolic pressure is estimated to be 40 to 45 mmHg.  Exam Date: 03/15/2015     Laboratory from December 11, 2017: Sodium 144, potassium 3.5, BUN 30 and creatinine 1.4.        Assessment:     1. Coronary artery disease due to calcified coronary lesion: CABG ×5 in 2009     2. HTN (hypertension), benign     3. Dyslipidemia     4. Dyspnea on exertion         Medical Decision Making:  Today's Assessment / Status / Plan:     Mr. Whitman is clinically stable. However, he continues to have significant difficulty with edema and also has JVD. At this time, we will place him on furosemide 20 mg twice a day and potassium  20 mEq twice a day. We might consider placing him on spironolactone. He will follow-up in about a month with a " BMP.

## 2018-02-20 NOTE — PATIENT INSTRUCTIONS
Start furosemide 20 mg twice a day. One tablet in the morning and one tablet in the early afternoon.    Start potassium 20 once twice a day with furosemide.    Blood pressure checks 3 times a week and various times a day.

## 2018-02-20 NOTE — LETTER
[facility]   [address]  [place] [address]  Phone: [phone]  Fax: [phone]              Abbe Whitman  1940    Encounter Date: 2/20/2018    Armin Dixon M.D.          PROGRESS NOTE:  Subjective:   Abbe Whitman is a 76 y.o. male who presents today for followup of his coronary artery disease with recurrent pleural effusions, hypertension and hyperlipidemia. He did undergo right pleurodesis. He did develop a left pleural effusion which ultimately caused some chest discomfort and led to an admission at the end of March 2015.    He continues on chronic oxygen therapy 24/7. He has no dyspnea on exertion and a slow walk. He's had no PND or orthopnea but is on CPAP with oxygen therapy at night. His edema has improved somewhat. He is noted no chest discomfort, palpitations or lightheadedness.      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   • Dyslipidemia 8/31/2011   • Dyspnea on exertion 2/19/2013   • GERD (gastroesophageal reflux disease)    • Heart burn    • HTN (hypertension) 8/31/2011   • HTN (hypertension) 2013    pt states well controlled   • Hypertension    • Indigestion    • Myocardial infarct 2000   • Other specified disorder of intestines 2013    constipation needs miralax daily   • Pleural effusion 7/1/2013   • Unspecified urinary incontinence      Past Surgical History:   Procedure Laterality Date   • ANGIOGRAM  2009   • BIOPSY GENERAL  10/13/2009    Performed by JULIA ALCANTARA at SURGERY [facility] ORS   • CATARACT EXTRACTION      both eyes   • INGUINAL HERNIA REPAIR  9/15/2010    Performed by GRIS GARCÍA at SURGERY [facility] ORS   • MAZE PROCEDURE  10/13/2009     Performed by JULIA ALCANTARA at SURGERY [facility]   • MULTIPLE CORONARY ARTERY BYPASS ENDO VEIN HARVEST  10/13/2009    Performed by JULIA ALCANTARA at SURGERY [facility] ORS   • OTHER      right knee orthoscopic    • OTHER CARDIAC SURGERY     • THORACOSCOPY  10/29/2013    Performed by John H Ganser, M.D. at SURGERY [facility] ORS     Family History   Problem Relation Age of Onset   • Other Mother      afib   • Heart Attack Maternal Uncle      History   Smoking Status   • Never Smoker   Smokeless Tobacco   • Never Used     Allergies   Allergen Reactions   • Prednisone      High dose caused tightness in throat. Are okay with Medrol pack per patient   • Ciprofloxacin      Per patient makes skin peel    • Dairy Aid [Lactase]      LACTOSE INTOLERANT   • Gluten Meal      BLOATED AND SICK   • Latex      Son says slight skin reaction     Outpatient Encounter Prescriptions as of 2/20/2018   Medication Sig Dispense Refill   • benazepril (LOTENSIN) 20 MG Tab Take 1 Tab by mouth every evening. 90 Tab 3   • rosuvastatin (CRESTOR) 40 MG tablet Take 1 Tab by mouth every day. 90 Tab 3   • furosemide (LASIX) 80 MG Tab Take 80 mg by mouth every day.  3   • XOPENEX HFA 45 MCG/ACT inhaler   1   • potassium chloride ER (KLOR-CON) 10 MEQ tablet   3   • Cholecalciferol (VITAMIN D) 2000 UNITS Cap Take  by mouth.     • Esomeprazole Magnesium (NEXIUM 24HR PO) Take 40 mg by mouth every day.     • metolazone (ZAROXOLYN) 2.5 MG Tab Take 2.5 mg by mouth every day.     • Umeclidinium-Vilanterol (ANORO ELLIPTA) 62.5-25 MCG/INH AEROSOL POWDER, BREATH ACTIVATED Take 1 Inhalation by mouth every day. 1 Each 11   • fluconazole (DIFLUCAN) 100 MG Tab Take 1 Tab by mouth every day. 5 Tab 0   • bisoprolol (ZEBETA) 5 MG Tab Take 1 Tab by mouth every day. 30 Tab 11   • nitroglycerin (NITROSTAT) 0.4 MG SL Tab Place 1 Tab under tongue as needed for Chest Pain. 25 Tab 5   • aspirin (ASA) 81 MG Chew Tab chewable tablet Take 1 Tab by mouth every day.  "90 Tab 3   • tamsulosin (FLOMAX) 0.4 MG capsule Take 0.4 mg by mouth 2 Times a Day.     • sulfamethoxazole-trimethoprim (BACTRIM DS) 800-160 MG tablet   0   • omeprazole (PRILOSEC) 20 MG delayed-release capsule Take 20 mg by mouth every day.     • lansoprazole (PREVACID) 30 MG CPDR Take 30 mg by mouth every day.       Facility-Administered Encounter Medications as of 2/20/2018   Medication Dose Route Frequency Provider Last Rate Last Dose   • albuterol inhaler 2 Puff  2 Puff Inhalation Q4HRS PRN KALE Amos       Objective:   /50   Pulse 80   Ht 1.93 m (6' 4\")   Wt 99.3 kg (219 lb)   SpO2 93%   BMI 26.66 kg/m²      Physical Exam   Neck: JVD present.   Cardiovascular: Normal rate and regular rhythm.  Exam reveals no gallop.    No murmur heard.  Pulmonary/Chest: Effort normal. He has no rales.   Abdominal: Soft. There is no tenderness.   Musculoskeletal: He exhibits edema (3+ pretibial).     Myocardial perfusion scan:  IMPRESSIONS  Normal left ventricular size, ejection fraction, and wall motion.  No evidence of significant jeopardized viable myocardium or prior myocardial   Infarction.  Normal left ventricular wall motion. LV ejection fraction = 53%.  Exam Date: 03/15/2015 10:37    Echo:  CONCLUSIONS  Technically difficult study.   Mildly reduced left ventricular systolic function.  Left ventricular ejection fraction is 45% to 50%.  Mild mitral regurgitation.  Moderate tricuspid regurgitation.  Right ventricular systolic pressure is estimated to be 40 to 45 mmHg.  Exam Date: 03/15/2015     Laboratory from December 11, 2017: Sodium 144, potassium 3.5, BUN 30 and creatinine 1.4.        Assessment:     1. Coronary artery disease due to calcified coronary lesion: CABG ×5 in 2009     2. HTN (hypertension), benign     3. Dyslipidemia     4. Dyspnea on exertion         Medical Decision Making:  Today's Assessment / Status / Plan:     Mr. Whitman is clinically stable. However, he " continues to have significant difficulty with edema and also has JVD. At this time, we will place him on furosemide 20 mg twice a day and potassium auscultation 20 mEq twice a day. We might consider placing him on spironolactone. He will follow-up in about a month with a BMP.            Skyla Foy M.D.  [address] [place] [id number]  VIA Facsimile: [phone]

## 2018-02-27 NOTE — TELEPHONE ENCOUNTER
Reviewed HRCT. No evidence of ILD. He can f/u with me in 6 months from last OV and cancel most recent appt in March

## 2018-02-27 NOTE — TELEPHONE ENCOUNTER
I called patient to offer a sooner appointment for his CT results. He said that he already went over them with hi s cardiologist ( Armin Brumfield) in great detail and is not sure if he really needs to come in at all right now. He would like to know if he cancel due to the long drive and reschedule  Based on when Julianne feels it is needed. Please advise.

## 2018-03-20 NOTE — PROGRESS NOTES
Chief Complaint   Patient presents with   • Coronary Artery Disease     F/V: 1 MO       Subjective:   Abbe Whitman is a 77 y.o. male who presents today for follow-up on his heart failure, CAD, hypertension, hyperlipidemia.    Patient Dr. Dixon. Patient was last seen in clinic on 2/20/18. During his last visit, patient was told to increase his furosemide to 20 mg twice a day along with potassium 20 mEq twice a day for volume overload.     Patient comes in to the office today, reporting patient has been taking furosemide 20 mg twice a day, but by the end of a week, patient has been increasing weight and swelling and has been taking 160 mg of furosemide once per day for that day. If that does not decrease his weight, swelling, patient then takes furosemide 80 mg daily with a metolazone 2.5 mg for the next day. Usually that brings his weight back down to his weight range of 215-219 lbs. Pt reports he generally gains about 1-1.5 lbs per day.     For his symptoms, patient reports fatigue, continued lower extremity edema, shortness breath on occasion with ADLs and exertion and occasional dizziness. Patient denies chest pain, palpitations, orthopnea or PND.    Patient establish with Dr. Shea (previous cardiologist) of a weight range between 215-219 pounds. Patient reports at times when his weight goes down to 213 lbs, he begins to feel lightheaded and his blood pressure is low. Pt is concerned about this.    Pt lives in [place], [place].    Additonally, patient has the following medical problems:    -CAD, s/p CABG x 5 on 10/12/09 (OLIVIER to LAD, RSVG to Ramus intermedius, Distal left circumflex in a sequential fashion, diagonal artery, RCA).    -cardiomyopathy, likely ischemic, EF 40-45% on echo from 3/26/17.    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 at SURGERY [facility]   • INGUINAL HERNIA REPAIR  9/15/2010    Performed by GRIS GARCÍA at SURGERY [facility]   • MULTIPLE CORONARY ARTERY BYPASS ENDO VEIN HARVEST  10/13/2009    Performed by JULIA ALCANTARA at SURGERY [facility]   • MAZE PROCEDURE  10/13/2009    Performed by JULIA ALCANTARA at SURGERY [facility]   • BIOPSY GENERAL  10/13/2009    Performed by JULIA ALCANTARA at SURGERY [facility]   • ANGIOGRAM  2009   • CATARACT EXTRACTION      both eyes   • OTHER      right knee orthoscopic    • OTHER CARDIAC SURGERY       Family History   Problem Relation Age of Onset   • Other Mother      afib   • Heart Attack Maternal Uncle      Social History     Social History   • Marital status:      Spouse name: N/A   • Number of children: N/A   • Years of education: N/A     Occupational History   • Not on file.     Social History Main Topics   • Smoking status: Never Smoker   • Smokeless tobacco: Never Used   • Alcohol use No   • Drug use: No    • Sexual activity: Not on file     Other Topics Concern   • Not on file     Social History Narrative   • No narrative on file     Allergies   Allergen Reactions   • Prednisone      High dose caused tightness in throat. Are okay with Medrol pack per patient   • Ciprofloxacin      Per patient makes skin peel    • Dairy Aid [Lactase]      LACTOSE INTOLERANT   • Gluten Meal      BLOATED AND SICK   • Latex      Son says slight skin reaction     Outpatient Encounter Prescriptions as of 3/20/2018   Medication Sig Dispense Refill   • furosemide (LASIX) 40 MG Tab Take 1 Tab by mouth 2 times a day. 60 Tab 11   • metOLazone (ZAROXOLYN) 2.5 MG Tab Take 1 Tab by mouth 1 time daily as needed. 30 Tab 11   • potassium chloride SA (KDUR) 20 MEQ Tab CR Take 1 Tab by mouth 2 times a day. 60 Tab 11   • benazepril (LOTENSIN) 20 MG Tab Take 1 Tab by mouth every evening. 90 Tab 3   • rosuvastatin (CRESTOR) 40 MG tablet Take 1 Tab by mouth every day. 90 Tab 3   • XOPENEX HFA 45 MCG/ACT inhaler Inhale 1 Puff by mouth every four hours as needed.  1   • Cholecalciferol (VITAMIN D) 2000 UNITS Cap Take  by mouth.     • Umeclidinium-Vilanterol (ANORO ELLIPTA) 62.5-25 MCG/INH AEROSOL POWDER, BREATH ACTIVATED Take 1 Inhalation by mouth every day. 1 Each 11   • bisoprolol (ZEBETA) 5 MG Tab Take 1 Tab by mouth every day. 30 Tab 11   • nitroglycerin (NITROSTAT) 0.4 MG SL Tab Place 1 Tab under tongue as needed for Chest Pain. 25 Tab 5   • aspirin (ASA) 81 MG Chew Tab chewable tablet Take 1 Tab by mouth every day. 90 Tab 3   • tamsulosin (FLOMAX) 0.4 MG capsule Take 0.4 mg by mouth every day.     • [DISCONTINUED] furosemide (LASIX) 20 MG Tab Take 1 Tab by mouth 2 times a day. 60 Tab 11   • [DISCONTINUED] furosemide (LASIX) 80 MG Tab Take 80 mg by mouth 1 time daily as needed.  3   • [DISCONTINUED] sulfamethoxazole-trimethoprim (BACTRIM DS) 800-160 MG tablet   0   • [DISCONTINUED] Esomeprazole Magnesium (NEXIUM 24HR PO) Take 40 mg by mouth every day.   "   • [DISCONTINUED] metolazone (ZAROXOLYN) 2.5 MG Tab Take 2.5 mg by mouth 1 time daily as needed.     • [DISCONTINUED] omeprazole (PRILOSEC) 20 MG delayed-release capsule Take 20 mg by mouth every day.     • [DISCONTINUED] fluconazole (DIFLUCAN) 100 MG Tab Take 1 Tab by mouth every day. 5 Tab 0   • [DISCONTINUED] lansoprazole (PREVACID) 30 MG CPDR Take 30 mg by mouth every day.       Facility-Administered Encounter Medications as of 3/20/2018   Medication Dose Route Frequency Provider Last Rate Last Dose   • albuterol inhaler 2 Puff  2 Puff Inhalation Q4HRS PRN Julianne Almaraz A.P.R.N.         Review of Systems   Constitutional: Positive for malaise/fatigue. Negative for fever.   Respiratory: Positive for shortness of breath. Negative for cough.    Cardiovascular: Positive for leg swelling. Negative for chest pain, palpitations, orthopnea, claudication and PND.   Gastrointestinal: Negative for abdominal pain.   Musculoskeletal: Negative for myalgias.   Neurological: Positive for dizziness (Occ).   All other systems reviewed and are negative.       Objective:   /70   Pulse 70   Ht 1.93 m (6' 4\")   Wt 99.3 kg (219 lb)   SpO2 99%   BMI 26.66 kg/m²     Physical Exam   Constitutional: He is oriented to person, place, and time. He appears well-developed and well-nourished.   HENT:   Head: Normocephalic and atraumatic.   Eyes: EOM are normal. Pupils are equal, round, and reactive to light.   Neck: Normal range of motion. Neck supple. JVD present.   Cardiovascular: Normal rate and regular rhythm.    Murmur heard.   Systolic murmur is present with a grade of 3/6   Pulmonary/Chest: Effort normal and breath sounds normal. No respiratory distress. He has no wheezes. He has no rales.   Uses continuous oxygen    Musculoskeletal: He exhibits edema (Bilateral 2+ LE edema).   Neurological: He is alert and oriented to person, place, and time.   Skin: Skin is warm and dry.   Psychiatric: He has a normal mood and affect. " His behavior is normal.     Lab Results   Component Value Date/Time    CHOLSTRLTOT 97 (L) 04/10/2014 08:39 AM    LDL 54 04/10/2014 08:39 AM    HDL 33 (A) 04/10/2014 08:39 AM    TRIGLYCERIDE 48 04/10/2014 08:39 AM       Lab Results   Component Value Date/Time    SODIUM 141 03/16/2015 02:50 AM    POTASSIUM 3.3 (L) 03/16/2015 02:50 AM    CHLORIDE 106 03/16/2015 02:50 AM    CO2 29 03/16/2015 02:50 AM    GLUCOSE 126 (H) 03/16/2015 02:50 AM    BUN 14 03/16/2015 02:50 AM    CREATININE 1.08 03/16/2015 02:50 AM    CREATININE 1.3 11/02/2005 10:30 AM     Lab Results   Component Value Date/Time    ALKPHOSPHAT 77 03/14/2015 07:02 PM    ASTSGOT 20 03/14/2015 07:02 PM    ALTSGPT 22 03/14/2015 07:02 PM    TBILIRUBIN 0.7 03/14/2015 07:02 PM      Echo 3/5/13  CONCLUSIONS  Left ventricular ejection fraction is 70% to 75%.  Mild left ventricular septal hypertrophy.  Grade II diastolic dysfunction is present.  Aortic sclerosis without stenosis or regurgitation.  Trace mitral regurgitation.  Right ventricular systolic pressure is estimated to be 32 mmHg   consistent with mild pulmonary hypertension.      Transthoracic Echo Report 3/15/15  Technically difficult study.   Mildly reduced left ventricular systolic function.  Left ventricular ejection fraction is 45% to 50%.  Mild mitral regurgitation.  Moderate tricuspid regurgitation.  Right ventricular systolic pressure is estimated to be 40 to 45 mmHg.    Other labs and imaging in [facility]     Last Echo on 3/26/17, EF 40-45%, mild-to-moderate regurg, moderate tricuspid regurg, RVSP 54 mmHg    Assessment:     1. ACC/AHA stage C systolic heart failure (CMS-HCC)  furosemide (LASIX) 40 MG Tab    metOLazone (ZAROXOLYN) 2.5 MG Tab    BASIC METABOLIC PANEL   2. NYHA class 3 systolic congestive heart failure with reduced left ventricular function (CMS-HCC)  furosemide (LASIX) 40 MG Tab    metOLazone (ZAROXOLYN) 2.5 MG Tab    BASIC METABOLIC PANEL   3. Coronary artery disease due to calcified coronary  lesion: CABG ×5 in 2009     4. Dyslipidemia     5. HTN (hypertension), benign     6. BRITTANY (obstructive sleep apnea)     7. Pulmonary emphysema, unspecified emphysema type (CMS-HCC)         Medical Decision Making:  Today's Assessment / Status / Plan:   1. HFrEF, Stage C, class 3, last echo EF 40-45%: pt continues to be volume overloaded.  -will increase furosemide to 40 mg BID (in hopes to keep pt more stable, may need to switch diuretics at next visit if furosemide ineffective with dose increase)  -Can take metolazone 2.5 mg daily as needed if furosemide ineffective (monitor for increased weight, edema and shortness of breath)  -Continue potassium 20 mEq twice a day  -Continue bisoprolol 5 mg daily  -Continue Benazepril 20 mg daily  -BMP in 2 week  -Reinforced s/sx of worsening heart failure with patient and weight monitoring. Pt verbalizes understanding. Pt to call office or RTC if present. PUMP line number given to pt.    2. CAD, s/p CABG x5, DLD: last LDL was 51 on 3/26/17  -Continue aspirin 81 mg daily  -Continue rosuvastatin 40 mg daily    3. HTN: stable  -Recommendations per above  -Encourage patient to monitor blood pressure    4. Sleep apnea:  -Continue CPAP    5. Lung disease:  -Continue oxygen, inhalers  -Continue to follow up with pulmonary    FU in clinic in 3 weeks. Sooner if needed.    Patient verbalizes understanding and agrees with the plan of care.     Collaborating MD: Mohini Soares MD

## 2018-03-20 NOTE — PATIENT INSTRUCTIONS
Increase Furosemide to 40 mg twice a day  Take Metolazone/Zaroxyln 2.5 mg daily as needed  BMP in 2 weeks

## 2018-03-20 NOTE — LETTER
[facility]   [address] [facility]  [place] [address]  Phone: [phone]  Fax: [phone]              Abbe Whitman  1940    Encounter Date: 3/20/2018    KALE Cuenca          PROGRESS NOTE:  Chief Complaint   Patient presents with   • Coronary Artery Disease     F/V: 1 MO       Subjective:   Abbe Whitman is a 77 y.o. male who presents today for follow-up on his heart failure, CAD, hypertension, hyperlipidemia.    Patient Dr. Dixon. Patient was last seen in clinic on 2/20/18. During his last visit, patient was told to increase his furosemide to 20 mg twice a day along with potassium 20 mEq twice a day for volume overload.     Patient comes in to the office today, reporting patient has been taking furosemide 20 mg twice a day, but by the end of a week, patient has been increasing weight and swelling and has been taking 160 mg of furosemide once per day for that day. If that does not decrease his weight, swelling, patient then takes furosemide 80 mg daily with a metolazone 2.5 mg for the next day. Usually that brings his weight back down to his weight range of 215-219 lbs. Pt reports he generally gains about 1-1.5 lbs per day.     For his symptoms, patient reports fatigue, continued lower extremity edema, shortness breath on occasion with ADLs and exertion and occasional dizziness. Patient denies chest pain, palpitations, orthopnea or PND.    Patient establish with Dr. Shea (previous cardiologist) of a weight range between 215-219 pounds. Patient reports at times when his weight goes down to 213 lbs, he begins to feel lightheaded and his blood pressure is low. Pt is concerned about this.    Pt lives in [place], [place].    Additonally, patient has the following medical problems:    -CAD, s/p CABG x 5 on 10/12/09 (OLIVIER to LAD, RSVG to Ramus intermedius, Distal left circumflex in a sequential fashion, diagonal artery,  RCA).    -cardiomyopathy, likely ischemic, EF 40-45% on echo from 3/26/17.    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 at SURGERY [facility]   • INGUINAL HERNIA REPAIR  9/15/2010    Performed by GRIS GARCÍA at SURGERY [facility]   • MULTIPLE CORONARY ARTERY BYPASS ENDO VEIN HARVEST  10/13/2009    Performed by JULIA ALCANTARA at SURGERY [facility]   • MAZE PROCEDURE  10/13/2009    Performed by JULIA ALCANTARA at SURGERY [facility]   • BIOPSY GENERAL  10/13/2009    Performed by JULIA ALCANTARA at SURGERY [facility]   • ANGIOGRAM  2009   • CATARACT EXTRACTION      both eyes   • OTHER      right knee orthoscopic    • OTHER CARDIAC SURGERY       Family History   Problem Relation Age of Onset   • Other Mother      afib   • Heart Attack Maternal Uncle      Social History     Social History   • Marital  status:      Spouse name: N/A   • Number of children: N/A   • Years of education: N/A     Occupational History   • Not on file.     Social History Main Topics   • Smoking status: Never Smoker   • Smokeless tobacco: Never Used   • Alcohol use No   • Drug use: No   • Sexual activity: Not on file     Other Topics Concern   • Not on file     Social History Narrative   • No narrative on file     Allergies   Allergen Reactions   • Prednisone      High dose caused tightness in throat. Are okay with Medrol pack per patient   • Ciprofloxacin      Per patient makes skin peel    • Dairy Aid [Lactase]      LACTOSE INTOLERANT   • Gluten Meal      BLOATED AND SICK   • Latex      Son says slight skin reaction     Outpatient Encounter Prescriptions as of 3/20/2018   Medication Sig Dispense Refill   • furosemide (LASIX) 40 MG Tab Take 1 Tab by mouth 2 times a day. 60 Tab 11   • metOLazone (ZAROXOLYN) 2.5 MG Tab Take 1 Tab by mouth 1 time daily as needed. 30 Tab 11   • potassium chloride SA (KDUR) 20 MEQ Tab CR Take 1 Tab by mouth 2 times a day. 60 Tab 11   • benazepril (LOTENSIN) 20 MG Tab Take 1 Tab by mouth every evening. 90 Tab 3   • rosuvastatin (CRESTOR) 40 MG tablet Take 1 Tab by mouth every day. 90 Tab 3   • XOPENEX HFA 45 MCG/ACT inhaler Inhale 1 Puff by mouth every four hours as needed.  1   • Cholecalciferol (VITAMIN D) 2000 UNITS Cap Take  by mouth.     • Umeclidinium-Vilanterol (ANORO ELLIPTA) 62.5-25 MCG/INH AEROSOL POWDER, BREATH ACTIVATED Take 1 Inhalation by mouth every day. 1 Each 11   • bisoprolol (ZEBETA) 5 MG Tab Take 1 Tab by mouth every day. 30 Tab 11   • nitroglycerin (NITROSTAT) 0.4 MG SL Tab Place 1 Tab under tongue as needed for Chest Pain. 25 Tab 5   • aspirin (ASA) 81 MG Chew Tab chewable tablet Take 1 Tab by mouth every day. 90 Tab 3   • tamsulosin (FLOMAX) 0.4 MG capsule Take 0.4 mg by mouth every day.     • [DISCONTINUED] furosemide (LASIX) 20 MG Tab Take 1 Tab by mouth 2 times a day. 60 Tab 11  "  • [DISCONTINUED] furosemide (LASIX) 80 MG Tab Take 80 mg by mouth 1 time daily as needed.  3   • [DISCONTINUED] sulfamethoxazole-trimethoprim (BACTRIM DS) 800-160 MG tablet   0   • [DISCONTINUED] Esomeprazole Magnesium (NEXIUM 24HR PO) Take 40 mg by mouth every day.     • [DISCONTINUED] metolazone (ZAROXOLYN) 2.5 MG Tab Take 2.5 mg by mouth 1 time daily as needed.     • [DISCONTINUED] omeprazole (PRILOSEC) 20 MG delayed-release capsule Take 20 mg by mouth every day.     • [DISCONTINUED] fluconazole (DIFLUCAN) 100 MG Tab Take 1 Tab by mouth every day. 5 Tab 0   • [DISCONTINUED] lansoprazole (PREVACID) 30 MG CPDR Take 30 mg by mouth every day.       Facility-Administered Encounter Medications as of 3/20/2018   Medication Dose Route Frequency Provider Last Rate Last Dose   • albuterol inhaler 2 Puff  2 Puff Inhalation Q4HRS PRN Julianne Almaraz, A.P.R.N.         Review of Systems   Constitutional: Positive for malaise/fatigue. Negative for fever.   Respiratory: Positive for shortness of breath. Negative for cough.    Cardiovascular: Positive for leg swelling. Negative for chest pain, palpitations, orthopnea, claudication and PND.   Gastrointestinal: Negative for abdominal pain.   Musculoskeletal: Negative for myalgias.   Neurological: Positive for dizziness (Occ).   All other systems reviewed and are negative.       Objective:   /70   Pulse 70   Ht 1.93 m (6' 4\")   Wt 99.3 kg (219 lb)   SpO2 99%   BMI 26.66 kg/m²      Physical Exam   Constitutional: He is oriented to person, place, and time. He appears well-developed and well-nourished.   HENT:   Head: Normocephalic and atraumatic.   Eyes: EOM are normal. Pupils are equal, round, and reactive to light.   Neck: Normal range of motion. Neck supple. JVD present.   Cardiovascular: Normal rate and regular rhythm.    Murmur heard.   Systolic murmur is present with a grade of 3/6   Pulmonary/Chest: Effort normal and breath sounds normal. No respiratory distress. " He has no wheezes. He has no rales.   Uses continuous oxygen    Musculoskeletal: He exhibits edema (Bilateral 2+ LE edema).   Neurological: He is alert and oriented to person, place, and time.   Skin: Skin is warm and dry.   Psychiatric: He has a normal mood and affect. His behavior is normal.     Lab Results   Component Value Date/Time    CHOLSTRLTOT 97 (L) 04/10/2014 08:39 AM    LDL 54 04/10/2014 08:39 AM    HDL 33 (A) 04/10/2014 08:39 AM    TRIGLYCERIDE 48 04/10/2014 08:39 AM       Lab Results   Component Value Date/Time    SODIUM 141 03/16/2015 02:50 AM    POTASSIUM 3.3 (L) 03/16/2015 02:50 AM    CHLORIDE 106 03/16/2015 02:50 AM    CO2 29 03/16/2015 02:50 AM    GLUCOSE 126 (H) 03/16/2015 02:50 AM    BUN 14 03/16/2015 02:50 AM    CREATININE 1.08 03/16/2015 02:50 AM    CREATININE 1.3 11/02/2005 10:30 AM     Lab Results   Component Value Date/Time    ALKPHOSPHAT 77 03/14/2015 07:02 PM    ASTSGOT 20 03/14/2015 07:02 PM    ALTSGPT 22 03/14/2015 07:02 PM    TBILIRUBIN 0.7 03/14/2015 07:02 PM      Echo 3/5/13  CONCLUSIONS  Left ventricular ejection fraction is 70% to 75%.  Mild left ventricular septal hypertrophy.  Grade II diastolic dysfunction is present.  Aortic sclerosis without stenosis or regurgitation.  Trace mitral regurgitation.  Right ventricular systolic pressure is estimated to be 32 mmHg   consistent with mild pulmonary hypertension.      Transthoracic Echo Report 3/15/15  Technically difficult study.   Mildly reduced left ventricular systolic function.  Left ventricular ejection fraction is 45% to 50%.  Mild mitral regurgitation.  Moderate tricuspid regurgitation.  Right ventricular systolic pressure is estimated to be 40 to 45 mmHg.    Other labs and imaging in [facility]     Last Echo on 3/26/17, EF 40-45%, mild-to-moderate regurg, moderate tricuspid regurg, RVSP 54 mmHg    Assessment:     1. ACC/AHA stage C systolic heart failure (CMS-HCC)  furosemide (LASIX) 40 MG Tab    metOLazone (ZAROXOLYN) 2.5 MG Tab     BASIC METABOLIC PANEL   2. NYHA class 3 systolic congestive heart failure with reduced left ventricular function (CMS-[facility])  furosemide (LASIX) 40 MG Tab    metOLazone (ZAROXOLYN) 2.5 MG Tab    BASIC METABOLIC PANEL   3. Coronary artery disease due to calcified coronary lesion: CABG ×5 in 2009     4. Dyslipidemia     5. HTN (hypertension), benign     6. BRITTANY (obstructive sleep apnea)     7. Pulmonary emphysema, unspecified emphysema type (CMS-HCC)         Medical Decision Making:  Today's Assessment / Status / Plan:   1. HFrEF, Stage C, class 3, last echo EF 40-45%: pt continues to be volume overloaded.  -will increase furosemide to 40 mg BID (in hopes to keep pt more stable, may need to switch diuretics at next visit if furosemide ineffective with dose increase)  -Can take metolazone 2.5 mg daily as needed if furosemide ineffective (monitor for increased weight, edema and shortness of breath)  -Continue potassium 20 mEq twice a day  -Continue bisoprolol 5 mg daily  -Continue Benazepril 20 mg daily  -BMP in 2 week  -Reinforced s/sx of worsening heart failure with patient and weight monitoring. Pt verbalizes understanding. Pt to call office or RTC if present. PUMP line number given to pt.    2. CAD, s/p CABG x5, DLD: last LDL was 51 on 3/26/17  -Continue aspirin 81 mg daily  -Continue rosuvastatin 40 mg daily    3. HTN: stable  -Recommendations per above  -Encourage patient to monitor blood pressure    4. Sleep apnea:  -Continue CPAP    5. Lung disease:  -Continue oxygen, inhalers  -Continue to follow up with pulmonary    FU in clinic in 3 weeks. Sooner if needed.    Patient verbalizes understanding and agrees with the plan of care.     Collaborating MD: MD Skyla Eng M.D.  [address] [place] [id number]  VIA Facsimile: [phone]

## 2018-04-11 NOTE — TELEPHONE ENCOUNTER
Message   Received: 2 days ago      Call patient   Message Contents   Armin Dixon M.D.  Laurie Tipton R.N.             Called re abnormal BNP. High CO2, and sodium. Mildly low K+. Can you get complete result tomorrow and call pt. He may need more K+ or spironolactone. He may also need to be seen sooner. He may also be dehydrated. Run it by one of the HF docs after you talk to him.   Thanks.      Patient called. Pt states /67 HR 82. Pt states he hasn't felt the best. Recent nasal bleed. Afrin prescribed, Nexium 24 also prescribed. Pt states potassium supplements also changed and he is unhappy with the GI side effects. Pt also states there have been varying opinions on his lasix and ideal weight and he states the lower he gets the lower his BP goes and the worse he feels. Pt made an appt to see FK tomorrow at 10:30.

## 2018-04-12 PROBLEM — I50.20 ACC/AHA STAGE C SYSTOLIC HEART FAILURE (HCC): Status: ACTIVE | Noted: 2017-05-17

## 2018-04-12 NOTE — LETTER
[facility]   [address], [facility] [address]  [place] [address]  Phone: [phone]  Fax: [phone]              Abbe Whitman  1940    Encounter Date: 4/12/2018    Armin Dixon M.D.          PROGRESS NOTE:  Chief Complaint   Patient presents with   • Coronary Artery Disease     follow up lab results       Subjective:   Abbe Whitman is a 77 y.o. male who presents today for follow-up of his coronary artery disease and congestive heart failure. He has a history of prior bypass graft surgery. Recently he had difficulty with epistaxis. He did see an ENT and the bleeding resolved after about 3 weeks.    He continues to have significant difficulty with edema. He has dyspnea on exertion at 50-60 feet. He's had no PND or orthopnea but is on CPAP therapy at night. He's had no chest discomfort or palpitations. He does have difficulty with lightheadedness.    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 at SURGERY [facility] ORS   • INGUINAL HERNIA REPAIR  9/15/2010    Performed by GRIS GARCÍA at SURGERY [facility] ORS   • MULTIPLE CORONARY ARTERY  BYPASS ENDO VEIN HARVEST  10/13/2009    Performed by JULIA ALCANTARA at SURGERY [facility] ORS   • MAZE PROCEDURE  10/13/2009    Performed by JULIA ALCANTARA at SUR[facility]ERY [facility] ORS   • BIOPSY GENERAL  10/13/2009    Performed by JULIA ALCANTARA at SURGERY [facility] ORS   • ANGIOGRAM  2009   • CATARACT EXTRACTION      both eyes   • OTHER      right knee orthoscopic    • OTHER CARDIAC SURGERY       Family History   Problem Relation Age of Onset   • Other Mother      afib   • Heart Attack Maternal Uncle      Social History     Social History   • Marital status:      Spouse name: N/A   • Number of children: N/A   • Years of education: N/A     Occupational History   • Not on file.     Social History Main Topics   • Smoking status: Never Smoker   • Smokeless tobacco: Never Used   • Alcohol use No   • Drug use: No   • Sexual activity: Not on file     Other Topics Concern   • Not on file     Social History Narrative   • No narrative on file     Allergies   Allergen Reactions   • Prednisone      High dose caused tightness in throat. Are okay with Medrol pack per patient   • Ciprofloxacin      Per patient makes skin peel    • Dairy Aid [Lactase]      LACTOSE INTOLERANT   • Gluten Meal      BLOATED AND SICK   • Latex      Son says slight skin reaction     Outpatient Encounter Prescriptions as of 4/12/2018   Medication Sig Dispense Refill   • ferrous gluconate (IRON 27) 240 (27 Fe) MG tablet Take 240 mg by mouth every day.     • furosemide (LASIX) 40 MG Tab Take 1 Tab by mouth 2 times a day. 60 Tab 11   • metOLazone (ZAROXOLYN) 2.5 MG Tab Take 1 Tab by mouth 1 time daily as needed. 30 Tab 11   • potassium chloride SA (KDUR) 20 MEQ Tab CR Take 1 Tab by mouth 2 times a day. 60 Tab 11   • benazepril (LOTENSIN) 20 MG Tab Take 1 Tab by mouth every evening. 90 Tab 3   • rosuvastatin (CRESTOR) 40 MG tablet Take 1 Tab by mouth every day. 90 Tab 3   • XOPENEX HFA 45 MCG/ACT inhaler Inhale 1 Puff by mouth every four hours as  "needed.  1   • Cholecalciferol (VITAMIN D) 2000 UNITS Cap Take  by mouth.     • Umeclidinium-Vilanterol (ANORO ELLIPTA) 62.5-25 MCG/INH AEROSOL POWDER, BREATH ACTIVATED Take 1 Inhalation by mouth every day. 1 Each 11   • bisoprolol (ZEBETA) 5 MG Tab Take 1 Tab by mouth every day. 30 Tab 11   • nitroglycerin (NITROSTAT) 0.4 MG SL Tab Place 1 Tab under tongue as needed for Chest Pain. 25 Tab 5   • aspirin (ASA) 81 MG Chew Tab chewable tablet Take 1 Tab by mouth every day. 90 Tab 3   • tamsulosin (FLOMAX) 0.4 MG capsule Take 0.4 mg by mouth every day.       Facility-Administered Encounter Medications as of 4/12/2018   Medication Dose Route Frequency Provider Last Rate Last Dose   • albuterol inhaler 2 Puff  2 Puff Inhalation Q4HRS PRN KALE Amos     Objective:   /60   Pulse 74   Ht 1.93 m (6' 4\")   Wt 97.5 kg (215 lb)   SpO2 92%   BMI 26.17 kg/m²      Physical Exam   Constitutional: He appears well-developed and well-nourished.   Neck: JVD (to the mid neck at 45°) present.   Cardiovascular: Normal rate and regular rhythm.    Murmur (grade 3/6 holosystolic at the lower left sternal border) heard.  Pulmonary/Chest: Effort normal and breath sounds normal. He has no rales.   Abdominal: Soft. There is no tenderness.   Musculoskeletal: He exhibits edema (2- 3+ pretibial).     Laboratory from April 9: Sodium 142, potassium 3.2, CO2 41, BUN 29 and creatinine 1.6. Estimated GFR 45. BNP from April 3: 379.    Assessment:     1. Coronary artery disease due to calcified coronary lesion: CABG ×5 in 2009     2. HTN (hypertension), benign     3. Dyslipidemia     4. Dyspnea on exertion     5. ACC/AHA stage C systolic heart failure (CMS-HCC)         Medical Decision Making:  Today's Assessment / Status / Plan:     Mr. Whitman is having significant difficulty with edema and a certain extent dyspnea. He may be developing more severe tricuspid insufficiency. At this time, we will reevaluate him with " an echocardiogram. His potassium level is low. We will add low-dose spironolactone. He will follow-up in about a week with a BMP and BNP. We also discussed wrapping his legs at night to see if this will improve his edema. He will continue to wear his support stockings during the day and use elevation.        Skyla Foy M.D.  [address] [place] [id number]  VIA Facsimile: [phone]

## 2018-04-12 NOTE — PROGRESS NOTES
Chief Complaint   Patient presents with   • Coronary Artery Disease     follow up lab results       Subjective:   Abbe Whitman is a 77 y.o. male who presents today for follow-up of his coronary artery disease and congestive heart failure. He has a history of prior bypass graft surgery. Recently he had difficulty with epistaxis. He did see an ENT and the bleeding resolved after about 3 weeks.    He continues to have significant difficulty with edema. He has dyspnea on exertion at 50-60 feet. He's had no PND or orthopnea but is on CPAP therapy at night. He's had no chest discomfort or palpitations. He does have difficulty with lightheadedness.    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 at SURGERY [facility] ORS   • INGUINAL HERNIA REPAIR  9/15/2010    Performed by GRIS GARCÍA at SURGERY [facility] ORS   • MULTIPLE CORONARY ARTERY BYPASS ENDO VEIN HARVEST  10/13/2009    Performed by JULIA ALCANTARA at [facility] ORS   • MAZE PROCEDURE  10/13/2009    Performed by JULIA ALCANTARA at [facility] ORS   • BIOPSY GENERAL  10/13/2009    Performed by JULIA ALCANTARA at [facility]  ORS   • ANGIOGRAM  2009   • CATARACT EXTRACTION      both eyes   • OTHER      right knee orthoscopic    • OTHER CARDIAC SURGERY       Family History   Problem Relation Age of Onset   • Other Mother      afib   • Heart Attack Maternal Uncle      Social History     Social History   • Marital status:      Spouse name: N/A   • Number of children: N/A   • Years of education: N/A     Occupational History   • Not on file.     Social History Main Topics   • Smoking status: Never Smoker   • Smokeless tobacco: Never Used   • Alcohol use No   • Drug use: No   • Sexual activity: Not on file     Other Topics Concern   • Not on file     Social History Narrative   • No narrative on file     Allergies   Allergen Reactions   • Prednisone      High dose caused tightness in throat. Are okay with Medrol pack per patient   • Ciprofloxacin      Per patient makes skin peel    • Dairy Aid [Lactase]      LACTOSE INTOLERANT   • Gluten Meal      BLOATED AND SICK   • Latex      Son says slight skin reaction     Outpatient Encounter Prescriptions as of 4/12/2018   Medication Sig Dispense Refill   • ferrous gluconate (IRON 27) 240 (27 Fe) MG tablet Take 240 mg by mouth every day.     • furosemide (LASIX) 40 MG Tab Take 1 Tab by mouth 2 times a day. 60 Tab 11   • metOLazone (ZAROXOLYN) 2.5 MG Tab Take 1 Tab by mouth 1 time daily as needed. 30 Tab 11   • potassium chloride SA (KDUR) 20 MEQ Tab CR Take 1 Tab by mouth 2 times a day. 60 Tab 11   • benazepril (LOTENSIN) 20 MG Tab Take 1 Tab by mouth every evening. 90 Tab 3   • rosuvastatin (CRESTOR) 40 MG tablet Take 1 Tab by mouth every day. 90 Tab 3   • XOPENEX HFA 45 MCG/ACT inhaler Inhale 1 Puff by mouth every four hours as needed.  1   • Cholecalciferol (VITAMIN D) 2000 UNITS Cap Take  by mouth.     • Umeclidinium-Vilanterol (ANORO ELLIPTA) 62.5-25 MCG/INH AEROSOL POWDER, BREATH ACTIVATED Take 1 Inhalation by mouth every day. 1 Each 11   • bisoprolol (ZEBETA) 5 MG Tab Take 1 Tab by mouth  "every day. 30 Tab 11   • nitroglycerin (NITROSTAT) 0.4 MG SL Tab Place 1 Tab under tongue as needed for Chest Pain. 25 Tab 5   • aspirin (ASA) 81 MG Chew Tab chewable tablet Take 1 Tab by mouth every day. 90 Tab 3   • tamsulosin (FLOMAX) 0.4 MG capsule Take 0.4 mg by mouth every day.       Facility-Administered Encounter Medications as of 4/12/2018   Medication Dose Route Frequency Provider Last Rate Last Dose   • albuterol inhaler 2 Puff  2 Puff Inhalation Q4HRS PRN KALE Amos     Objective:   /60   Pulse 74   Ht 1.93 m (6' 4\")   Wt 97.5 kg (215 lb)   SpO2 92%   BMI 26.17 kg/m²     Physical Exam   Constitutional: He appears well-developed and well-nourished.   Neck: JVD (to the mid neck at 45°) present.   Cardiovascular: Normal rate and regular rhythm.    Murmur (grade 3/6 holosystolic at the lower left sternal border) heard.  Pulmonary/Chest: Effort normal and breath sounds normal. He has no rales.   Abdominal: Soft. There is no tenderness.   Musculoskeletal: He exhibits edema (2- 3+ pretibial).     Laboratory from April 9: Sodium 142, potassium 3.2, CO2 41, BUN 29 and creatinine 1.6. Estimated GFR 45. BNP from April 3: 379.    Assessment:     1. Coronary artery disease due to calcified coronary lesion: CABG ×5 in 2009     2. HTN (hypertension), benign     3. Dyslipidemia     4. Dyspnea on exertion     5. ACC/AHA stage C systolic heart failure (CMS-HCC)         Medical Decision Making:  Today's Assessment / Status / Plan:     Mr. Whitman is having significant difficulty with edema and dyspnea. He may be developing more severe tricuspid insufficiency. At this time, we will reevaluate him with an echocardiogram. His potassium level is low. We will add low-dose spironolactone. He will follow-up in about a week with a BMP and BNP. We also discussed wrapping his legs at night to see if this will improve his edema. He will continue to wear his support stockings during the day and " use elevation.

## 2018-04-16 NOTE — PROGRESS NOTES
Chief Complaint   Patient presents with   • Congestive Heart Failure     HF est.       Subjective:   Abbe Whitman is a 77 y.o. male who presents today for follow-up on his heart failure, CAD, hypertension, hyperlipidemia with his wife.    Patient Dr. Dixon. Patient was last seen in clinic on 4/12/18. During his last visit, pt was started on Spironolactone 12.5 mg daily for low potassium level. Patient reports he did not start spironolactone because he missed instructions from Dr. Dixon. Patient completed his echo last week.    Patient also reports he did stop his furosemide about 5 days ago and has not been taking it.     Patient comes in to the office today, reporting his home weight has been stable at 215 pounds.    For his symptoms, patient reports continued lower extremity edema, fatigue, shortness breath on occasion with ADLs and exertion and occasional dizziness. Patient denies chest pain, palpitations, orthopnea or PND.    Patient establish with Dr. Shea (previous cardiologist) of a weight range between 215-219 pounds. Patient reports at times when his weight goes down to 213 lbs, he begins to feel lightheaded and his blood pressure is low. Pt is concerned about this.    Pt lives in [place], [place].    Additonally, patient has the following medical problems:    -CAD, s/p CABG x 5 on 10/12/09 (OLIVIER to LAD, RSVG to Ramus intermedius, Distal left circumflex in a sequential fashion, diagonal artery, RCA).    -cardiomyopathy, likely ischemic, EF 40-45% on echo from 3/26/17.    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 at [facility]   • INGUINAL HERNIA REPAIR  9/15/2010    Performed by GRIS GARCÍA at [facility]   • MULTIPLE CORONARY ARTERY BYPASS ENDO VEIN HARVEST  10/13/2009    Performed by JULIA ALCANTARA at [facility]   • MAZE PROCEDURE  10/13/2009    Performed by JULIA ALCANTARA at [facility]   • BIOPSY GENERAL  10/13/2009    Performed by JULIA ALCANTARA at [facility]   • ANGIOGRAM  2009   • CATARACT EXTRACTION      both eyes   • OTHER      right knee orthoscopic    • OTHER CARDIAC SURGERY       Family History   Problem Relation Age of Onset   • Other Mother      afib   • Heart Attack Maternal Uncle      Social History     Social History   • Marital status:      Spouse name: N/A   • Number of children: N/A   • Years of education: N/A     Occupational History   • Not on file.     Social History Main Topics   • Smoking status: Never Smoker   • Smokeless tobacco: Never Used   • Alcohol use No   • Drug use: No   • Sexual activity: Not on file     Other Topics Concern   • Not on file     Social History Narrative   • No narrative on file     Allergies   Allergen Reactions   • Prednisone      High dose caused tightness in throat. Are okay with Medrol pack  per patient   • Ciprofloxacin      Per patient makes skin peel    • Dairy Aid [Lactase]      LACTOSE INTOLERANT   • Gluten Meal      BLOATED AND SICK   • Latex      Son says slight skin reaction     Outpatient Encounter Prescriptions as of 4/16/2018   Medication Sig Dispense Refill   • esomeprazole magnesium (NEXIUM) 40 MG Pack Take 40 mg by mouth every morning before breakfast.     • lactose Powder Take 50 g by mouth Once.     • Probiotic Product (PROBIOTIC DAILY PO) Take  by mouth.     • ipratropium (ATROVENT) 0.06 % Solution   11   • FLUTICASONE FUROATE NA [place]  in nose.     • ferrous gluconate (IRON 27) 240 (27 Fe) MG tablet Take 240 mg by mouth every day.     • metOLazone (ZAROXOLYN) 2.5 MG Tab Take 1 Tab by mouth 1 time daily as needed. 30 Tab 11   • benazepril (LOTENSIN) 20 MG Tab Take 1 Tab by mouth every evening. 90 Tab 3   • rosuvastatin (CRESTOR) 40 MG tablet Take 1 Tab by mouth every day. 90 Tab 3   • Cholecalciferol (VITAMIN D) 2000 UNITS Cap Take  by mouth.     • Umeclidinium-Vilanterol (ANORO ELLIPTA) 62.5-25 MCG/INH AEROSOL POWDER, BREATH ACTIVATED Take 1 Inhalation by mouth every day. 1 Each 11   • bisoprolol (ZEBETA) 5 MG Tab Take 1 Tab by mouth every day. 30 Tab 11   • nitroglycerin (NITROSTAT) 0.4 MG SL Tab Place 1 Tab under tongue as needed for Chest Pain. 25 Tab 5   • aspirin (ASA) 81 MG Chew Tab chewable tablet Take 1 Tab by mouth every day. 90 Tab 3   • tamsulosin (FLOMAX) 0.4 MG capsule Take 0.4 mg by mouth every day.     • spironolactone (ALDACTONE) 25 MG Tab Take 0.5 Tabs by mouth every day. 30 Tab 3   • furosemide (LASIX) 40 MG Tab Take 1 Tab by mouth 2 times a day. 60 Tab 11   • potassium chloride SA (KDUR) 20 MEQ Tab CR Take 1 Tab by mouth 2 times a day. 60 Tab 11   • XOPENEX HFA 45 MCG/ACT inhaler Inhale 1 Puff by mouth every four hours as needed.  1     Facility-Administered Encounter Medications as of 4/16/2018   Medication Dose Route Frequency Provider Last Rate Last Dose   •  "albuterol inhaler 2 Puff  2 Puff Inhalation Q4HRS PRN Julianne Almaraz A.P.R.N.         Review of Systems   Constitutional: Positive for malaise/fatigue. Negative for fever.   Respiratory: Positive for shortness of breath. Negative for cough.    Cardiovascular: Positive for leg swelling. Negative for chest pain, palpitations, orthopnea, claudication and PND.   Gastrointestinal: Negative for abdominal pain.   Musculoskeletal: Negative for myalgias.   Neurological: Positive for dizziness (Occ).   All other systems reviewed and are negative.       Objective:   /80   Pulse 78   Ht 1.93 m (6' 4\")   Wt 97.5 kg (215 lb)   SpO2 94%   BMI 26.17 kg/m²     Physical Exam   Constitutional: He is oriented to person, place, and time. He appears well-developed and well-nourished.   HENT:   Head: Normocephalic and atraumatic.   Eyes: EOM are normal. Pupils are equal, round, and reactive to light.   Neck: Normal range of motion. Neck supple. JVD present.   Cardiovascular: Normal rate and regular rhythm.    Murmur heard.   Systolic murmur is present with a grade of 3/6   Pulmonary/Chest: Effort normal and breath sounds normal. No respiratory distress. He has no wheezes. He has no rales.   Uses continuous oxygen    Musculoskeletal: He exhibits edema (Bilateral 2+ LE edema).   Neurological: He is alert and oriented to person, place, and time.   Skin: Skin is warm and dry.   Psychiatric: He has a normal mood and affect. His behavior is normal.     Lab Results   Component Value Date/Time    CHOLSTRLTOT 97 (L) 04/10/2014 08:39 AM    LDL 54 04/10/2014 08:39 AM    HDL 33 (A) 04/10/2014 08:39 AM    TRIGLYCERIDE 48 04/10/2014 08:39 AM       Lab Results   Component Value Date/Time    SODIUM 141 03/16/2015 02:50 AM    POTASSIUM 3.3 (L) 03/16/2015 02:50 AM    CHLORIDE 106 03/16/2015 02:50 AM    CO2 29 03/16/2015 02:50 AM    GLUCOSE 126 (H) 03/16/2015 02:50 AM    BUN 14 03/16/2015 02:50 AM    CREATININE 1.08 03/16/2015 02:50 AM    " CREATININE 1.3 11/02/2005 10:30 AM     Lab Results   Component Value Date/Time    ALKPHOSPHAT 77 03/14/2015 07:02 PM    ASTSGOT 20 03/14/2015 07:02 PM    ALTSGPT 22 03/14/2015 07:02 PM    TBILIRUBIN 0.7 03/14/2015 07:02 PM      Echo 3/5/13  CONCLUSIONS  Left ventricular ejection fraction is 70% to 75%.  Mild left ventricular septal hypertrophy.  Grade II diastolic dysfunction is present.  Aortic sclerosis without stenosis or regurgitation.  Trace mitral regurgitation.  Right ventricular systolic pressure is estimated to be 32 mmHg   consistent with mild pulmonary hypertension.      Transthoracic Echo Report 3/15/15  Technically difficult study.   Mildly reduced left ventricular systolic function.  Left ventricular ejection fraction is 45% to 50%.  Mild mitral regurgitation.  Moderate tricuspid regurgitation.  Right ventricular systolic pressure is estimated to be 40 to 45 mmHg.    Other labs and imaging in [facility]     Last Echo on 3/26/17, EF 40-45%, mild-to-moderate regurg, moderate tricuspid regurg, RVSP 54 mmHg    Transthoracic Echo Report 4/12/18  The left ventricle was normal in size and thickness. Mildly reduced   left ventricular systolic function. Septal and inferior wall   hypokinesis. Left ventricular ejection fraction is visually estimated   to be 45-50%. Grade III diastolic dysfunction (restrictive pattern).   Abnormal septal motion consistent with right ventricular (RV) volume   overload and/or elevated RV end-diastolic pressure.  Moderately dilated right ventricle. Reduced right ventricular systolic   function.  Enlarged right atrium. Dilated inferior vena cava without inspiratory   collapse.  Borderline mildly dilated left atrium.  Structurally normal tricuspid valve without significant stenosis.   Moderate tricuspid regurgitation. Estimated right ventricular systolic   pressure  is 68 mmHg. Possibly severe TR with TR jet wrapping around   posterior aspect of RA.  Compared to the report of the study  done 3/2015- the right side is now   dilated and the estimated RVSP has increased. Probable severe TR is   present    Assessment:     1. ACC/AHA stage C systolic heart failure (CMS-HCC)  MR-CARDIAC MORPH/FUNC WITH & W/O   2. NYHA class 3 systolic congestive heart failure with reduced left ventricular function (CMS-HCC)  MR-CARDIAC MORPH/FUNC WITH & W/O   3. Coronary artery disease due to calcified coronary lesion: CABG ×5 in 2009     4. Dyslipidemia     5. HTN (hypertension), benign     6. BRITTANY (obstructive sleep apnea)     7. Pulmonary emphysema, unspecified emphysema type (CMS-[facility])         Medical Decision Making:  Today's Assessment / Status / Plan:   1. 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 Discussed patient and echo with Dr. Dixon and Dr. Murillo. Pt continues to be volume overloaded.   -Will obtain cardiac MRI to evaluate constrictive cardiomyopathy  -Patient to start spironolactone 12.5 mg daily (as previously directed)  -Patient to restart furosemide 40 mg BID (discussed with patient the importance of this medication and its effects on his volume status)   -metolazone 2.5 mg daily as needed if furosemide ineffective (monitor for increased weight, edema and shortness of breath)  -Continue potassium 20 mEq twice a day  -Continue bisoprolol 5 mg daily  -Continue Benazepril 20 mg daily  -BMP in 1 week (lab forms reprinted for patient)  -Reinforced s/sx of worsening heart failure with patient and weight monitoring. Pt verbalizes understanding. Pt to call office or RTC if present. PUMP line number given to pt.     2. CAD, s/p CABG x5, DLD: last LDL was 51 on 3/26/17  -Continue aspirin 81 mg daily  -Continue rosuvastatin 40 mg daily    3. HTN: stable  -Recommendations per above  -Encourage patient to monitor blood pressure    4. Sleep apnea:  -Continue CPAP    5. Lung disease:  -Continue oxygen, inhalers  -Continue to follow up with pulmonary    FU in  clinic in 2 weeks. Sooner if needed.    Patient verbalizes understanding and agrees with the plan of care.     Collaborating MD: Franco Murillo MD

## 2018-04-16 NOTE — LETTER
[facility]   [address]  [place] [address]  Phone: [phone]  Fax: [phone]              Abbe Whitman  1940    Encounter Date: 4/16/2018    KALE Cuenca          PROGRESS NOTE:  Chief Complaint   Patient presents with   • Congestive Heart Failure     HF est.       Subjective:   Abbe Whitman is a 77 y.o. male who presents today for follow-up on his heart failure, CAD, hypertension, hyperlipidemia with his wife.    Patient Dr. Dixon. Patient was last seen in clinic on 4/12/18. During his last visit, pt was started on Spironolactone 12.5 mg daily for low potassium level. Patient reports he did not start spironolactone because he missed instructions from Dr. Dixon. Patient completed his echo last week.    Patient also reports he did stop his furosemide about 5 days ago and has not been taking it.     Patient comes in to the office today, reporting his home weight has been stable at 215 pounds.    For his symptoms, patient reports continued lower extremity edema, fatigue, shortness breath on occasion with ADLs and exertion and occasional dizziness. Patient denies chest pain, palpitations, orthopnea or PND.    Patient establish with Dr. Shea (previous cardiologist) of a weight range between 215-219 pounds. Patient reports at times when his weight goes down to 213 lbs, he begins to feel lightheaded and his blood pressure is low. Pt is concerned about this.    Pt lives in [place], [place].    Additonally, patient has the following medical problems:    -CAD, s/p CABG x 5 on 10/12/09 (OLIVIER to LAD, RSVG to Ramus intermedius, Distal left circumflex in a sequential fashion, diagonal artery, RCA).    -cardiomyopathy, likely ischemic, EF 40-45% on echo from 3/26/17.    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 at SURGERY [facility]   • INGUINAL HERNIA REPAIR  9/15/2010    Performed by GRIS GARCÍA at SURGERY [facility]   • MULTIPLE CORONARY ARTERY BYPASS ENDO VEIN HARVEST  10/13/2009    Performed by JULIA ALCANTARA at SURGERY [facility]   • MAZE PROCEDURE  10/13/2009    Performed by JULIA ALCANTARA at SURGERY [facility]   • BIOPSY GENERAL  10/13/2009    Performed by JULIA ALCANTARA at SURGERY [facility]   • ANGIOGRAM  2009   • CATARACT EXTRACTION      both eyes   • OTHER      right knee orthoscopic    • OTHER CARDIAC SURGERY       Family History   Problem Relation Age of Onset   • Other Mother      afib   • Heart Attack Maternal Uncle      Social History     Social History   • Marital status:      Spouse name: N/A   • Number of children: N/A   • Years of education: N/A     Occupational History   • Not on file.     Social History Main Topics   • Smoking status: Never Smoker   • Smokeless tobacco: Never Used   •  Alcohol use No   • Drug use: No   • Sexual activity: Not on file     Other Topics Concern   • Not on file     Social History Narrative   • No narrative on file     Allergies   Allergen Reactions   • Prednisone      High dose caused tightness in throat. Are okay with Medrol pack per patient   • Ciprofloxacin      Per patient makes skin peel    • Dairy Aid [Lactase]      LACTOSE INTOLERANT   • Gluten Meal      BLOATED AND SICK   • Latex      Son says slight skin reaction     Outpatient Encounter Prescriptions as of 4/16/2018   Medication Sig Dispense Refill   • esomeprazole magnesium (NEXIUM) 40 MG Pack Take 40 mg by mouth every morning before breakfast.     • lactose Powder Take 50 g by mouth Once.     • Probiotic Product (PROBIOTIC DAILY PO) Take  by mouth.     • ipratropium (ATROVENT) 0.06 % Solution   11   • FLUTICASONE FUROATE NA [place]  in nose.     • ferrous gluconate (IRON 27) 240 (27 Fe) MG tablet Take 240 mg by mouth every day.     • metOLazone (ZAROXOLYN) 2.5 MG Tab Take 1 Tab by mouth 1 time daily as needed. 30 Tab 11   • benazepril (LOTENSIN) 20 MG Tab Take 1 Tab by mouth every evening. 90 Tab 3   • rosuvastatin (CRESTOR) 40 MG tablet Take 1 Tab by mouth every day. 90 Tab 3   • Cholecalciferol (VITAMIN D) 2000 UNITS Cap Take  by mouth.     • Umeclidinium-Vilanterol (ANORO ELLIPTA) 62.5-25 MCG/INH AEROSOL POWDER, BREATH ACTIVATED Take 1 Inhalation by mouth every day. 1 Each 11   • bisoprolol (ZEBETA) 5 MG Tab Take 1 Tab by mouth every day. 30 Tab 11   • nitroglycerin (NITROSTAT) 0.4 MG SL Tab Place 1 Tab under tongue as needed for Chest Pain. 25 Tab 5   • aspirin (ASA) 81 MG Chew Tab chewable tablet Take 1 Tab by mouth every day. 90 Tab 3   • tamsulosin (FLOMAX) 0.4 MG capsule Take 0.4 mg by mouth every day.     • spironolactone (ALDACTONE) 25 MG Tab Take 0.5 Tabs by mouth every day. 30 Tab 3   • furosemide (LASIX) 40 MG Tab Take 1 Tab by mouth 2 times a day. 60 Tab 11   • potassium chloride SA (KDUR) 20  "MEQ Tab CR Take 1 Tab by mouth 2 times a day. 60 Tab 11   • XOPENEX HFA 45 MCG/ACT inhaler Inhale 1 Puff by mouth every four hours as needed.  1     Facility-Administered Encounter Medications as of 4/16/2018   Medication Dose Route Frequency Provider Last Rate Last Dose   • albuterol inhaler 2 Puff  2 Puff Inhalation Q4HRS PRN Julianne Almaraz, A.P.R.N.         Review of Systems   Constitutional: Positive for malaise/fatigue. Negative for fever.   Respiratory: Positive for shortness of breath. Negative for cough.    Cardiovascular: Positive for leg swelling. Negative for chest pain, palpitations, orthopnea, claudication and PND.   Gastrointestinal: Negative for abdominal pain.   Musculoskeletal: Negative for myalgias.   Neurological: Positive for dizziness (Occ).   All other systems reviewed and are negative.       Objective:   /80   Pulse 78   Ht 1.93 m (6' 4\")   Wt 97.5 kg (215 lb)   SpO2 94%   BMI 26.17 kg/m²      Physical Exam   Constitutional: He is oriented to person, place, and time. He appears well-developed and well-nourished.   HENT:   Head: Normocephalic and atraumatic.   Eyes: EOM are normal. Pupils are equal, round, and reactive to light.   Neck: Normal range of motion. Neck supple. JVD present.   Cardiovascular: Normal rate and regular rhythm.    Murmur heard.   Systolic murmur is present with a grade of 3/6   Pulmonary/Chest: Effort normal and breath sounds normal. No respiratory distress. He has no wheezes. He has no rales.   Uses continuous oxygen    Musculoskeletal: He exhibits edema (Bilateral 2+ LE edema).   Neurological: He is alert and oriented to person, place, and time.   Skin: Skin is warm and dry.   Psychiatric: He has a normal mood and affect. His behavior is normal.     Lab Results   Component Value Date/Time    CHOLSTRLTOT 97 (L) 04/10/2014 08:39 AM    LDL 54 04/10/2014 08:39 AM    HDL 33 (A) 04/10/2014 08:39 AM    TRIGLYCERIDE 48 04/10/2014 08:39 AM       Lab Results "   Component Value Date/Time    SODIUM 141 03/16/2015 02:50 AM    POTASSIUM 3.3 (L) 03/16/2015 02:50 AM    CHLORIDE 106 03/16/2015 02:50 AM    CO2 29 03/16/2015 02:50 AM    GLUCOSE 126 (H) 03/16/2015 02:50 AM    BUN 14 03/16/2015 02:50 AM    CREATININE 1.08 03/16/2015 02:50 AM    CREATININE 1.3 11/02/2005 10:30 AM     Lab Results   Component Value Date/Time    ALKPHOSPHAT 77 03/14/2015 07:02 PM    ASTSGOT 20 03/14/2015 07:02 PM    ALTSGPT 22 03/14/2015 07:02 PM    TBILIRUBIN 0.7 03/14/2015 07:02 PM      Echo 3/5/13  CONCLUSIONS  Left ventricular ejection fraction is 70% to 75%.  Mild left ventricular septal hypertrophy.  Grade II diastolic dysfunction is present.  Aortic sclerosis without stenosis or regurgitation.  Trace mitral regurgitation.  Right ventricular systolic pressure is estimated to be 32 mmHg   consistent with mild pulmonary hypertension.      Transthoracic Echo Report 3/15/15  Technically difficult study.   Mildly reduced left ventricular systolic function.  Left ventricular ejection fraction is 45% to 50%.  Mild mitral regurgitation.  Moderate tricuspid regurgitation.  Right ventricular systolic pressure is estimated to be 40 to 45 mmHg.    Other labs and imaging in Media     Last Echo on 3/26/17, EF 40-45%, mild-to-moderate regurg, moderate tricuspid regurg, RVSP 54 mmHg    Transthoracic Echo Report 4/12/18  The left ventricle was normal in size and thickness. Mildly reduced   left ventricular systolic function. Septal and inferior wall   hypokinesis. Left ventricular ejection fraction is visually estimated   to be 45-50%. Grade III diastolic dysfunction (restrictive pattern).   Abnormal septal motion consistent with right ventricular (RV) volume   overload and/or elevated RV end-diastolic pressure.  Moderately dilated right ventricle. Reduced right ventricular systolic   function.  Enlarged right atrium. Dilated inferior vena cava without inspiratory   collapse.  Borderline mildly dilated left  atrium.  Structurally normal tricuspid valve without significant stenosis.   Moderate tricuspid regurgitation. Estimated right ventricular systolic   pressure  is 68 mmHg. Possibly severe TR with TR jet wrapping around   posterior aspect of RA.  Compared to the report of the study done 3/2015- the right side is now   dilated and the estimated RVSP has increased. Probable severe TR is   present    Assessment:     1. ACC/AHA stage C systolic heart failure (CMS-HCC)  MR-CARDIAC MORPH/FUNC WITH & W/O   2. NYHA class 3 systolic congestive heart failure with reduced left ventricular function (CMS-HCC)  MR-CARDIAC MORPH/FUNC WITH & W/O   3. Coronary artery disease due to calcified coronary lesion: CABG ×5 in 2009     4. Dyslipidemia     5. HTN (hypertension), benign     6. BRITTANY (obstructive sleep apnea)     7. Pulmonary emphysema, unspecified emphysema type (CMS-[facility])         Medical Decision Making:  Today's Assessment / Status / Plan:   1. 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 Discussed patient and echo with Dr. Dixon and Dr. Murillo. Pt continues to be volume overloaded.   -Will obtain cardiac MRI to evaluate constrictive cardiomyopathy  -Patient to start spironolactone 12.5 mg daily (as previously directed)  -Patient to restart furosemide 40 mg BID (discussed with patient the importance of this medication and its effects on his volume status)   -metolazone 2.5 mg daily as needed if furosemide ineffective (monitor for increased weight, edema and shortness of breath)  -Continue potassium 20 mEq twice a day  -Continue bisoprolol 5 mg daily  -Continue Benazepril 20 mg daily  -BMP in 1 week (lab forms repented for patient)  -Reinforced s/sx of worsening heart failure with patient and weight monitoring. Pt verbalizes understanding. Pt to call office or RTC if present. PUMP line number given to pt.     2. CAD, s/p CABG x5, DLD: last LDL was 51 on 3/26/17  -Continue  aspirin 81 mg daily  -Continue rosuvastatin 40 mg daily    3. HTN: stable  -Recommendations per above  -Encourage patient to monitor blood pressure    4. Sleep apnea:  -Continue CPAP    5. Lung disease:  -Continue oxygen, inhalers  -Continue to follow up with pulmonary    FU in clinic in 2 weeks. Sooner if needed.    Patient verbalizes understanding and agrees with the plan of care.     Collaborating MD: MD Skyla Ramírez M.D.  [address] [place] [id number]  VIA Facsimile: [phone]

## 2018-05-07 NOTE — PROGRESS NOTES
"Reevaluation of 6MWT/MLWHF Questionnaire    Date: 8/22/2017  6MWT: refused. Feels too dizzy  MLWHF: 48    Date: 2/20/2018  6MWT: 183 meters  MLWHF: did not complete    OP Heart Failure  Vitals  Appointment Type: Heart Failure Established  Weight: 99.3 kg (219 lb)  Weight Source: Stand Up Scale  Height: 193 cm (6' 4\")  BMI (Calculated): 26.66  Blood Pressure : 120/50  Pulse: 80    System Assessment  NYHA Functional Class Assessment:  (not yet staged/classed)     Smoking Hx  Have you Ever Smoked: Never    Alcohol Hx  Do you Drink?: No     Illicit Drug Hx  Illicit Drug History: No    MN Living with Heart Failure  Swelling in Ankles or Legs:  (see previous)    6 Minute Walk Test  Baseline to end of test: 6:00  Total meters walked: 183           CLARE Crowder RN  [phone]  "

## 2018-05-23 NOTE — PATIENT INSTRUCTIONS
Change metolazone (Zaroxolyn) to 2.5 mg one half tab daily.  If weight drops below 210 pounds, change this to every other day.    Change Flomax to bedtime dosing.

## 2018-05-23 NOTE — LETTER
[facility] Heart and Vascular [facility]   [address]  [place] [address]  Phone: [phone]  Fax: [phone]              Abbe Whitman  1940    Encounter Date: 5/23/2018    Armin Dixon M.D.          PROGRESS NOTE:  Chief Complaint   Patient presents with   • Coronary Artery Disease     F/V: MR CARDIAC IN EPIC       Subjective:   Abbe Whitman is a 77 y.o. male who presents today for follow-up of his coronary artery disease and congestive heart failure. He has a history of prior bypass graft surgery.     On a good day, he can walk about 50-60 yards before his dyspneic.  At other times he will be dyspneic at less than 50 feet.  He had no PND orthopnea on CPAP therapy.  He does have nocturia every 3 hours.  His edema waxes and wanes based on his diuretic use.  His weight may go up about 6 pounds on occasion.    He continues have some difficulty with edema.  He also has lightheadedness especially orthostatic lightheadedness.  He has noted no palpitations or anginal type chest pain.    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 at SURGERY [facility]      • INGUINAL HERNIA REPAIR  9/15/2010    Performed by GRIS GARCÍA at SURGERY [facility]S   • MULTIPLE CORONARY ARTERY BYPASS ENDO VEIN HARVEST  10/13/2009    Performed by JULIA ALCANTARA at SURGERY [facility]S   • MAZE PROCEDURE  10/13/2009    Performed by JULIA ALCANTARA at SURGERY [facility]   • BIOPSY GENERAL  10/13/2009    Performed by JULIA ALCANTARA at SURGERY [facility]   • ANGIOGRAM  2009   • CATARACT EXTRACTION      both eyes   • OTHER      right knee orthoscopic    • OTHER CARDIAC SURGERY       Family History   Problem Relation Age of Onset   • Other Mother      afib   • Heart Attack Maternal Uncle      Social History     Social History   • Marital status:      Spouse name: N/A   • Number of children: N/A   • Years of education: N/A     Occupational History   • Not on file.     Social History Main Topics   • Smoking status: Never Smoker   • Smokeless tobacco: Never Used   • Alcohol use No   • Drug use: No   • Sexual activity: Not on file     Other Topics Concern   • Not on file     Social History Narrative   • No narrative on file     Allergies   Allergen Reactions   • Prednisone      High dose caused tightness in throat. Are okay with Medrol pack per patient   • Ciprofloxacin      Per patient makes skin peel    • Dairy Aid [Lactase]      LACTOSE INTOLERANT   • Gluten Meal      BLOATED AND SICK   • Latex      Son says slight skin reaction     Outpatient Encounter Prescriptions as of 5/23/2018   Medication Sig Dispense Refill   • bisoprolol (ZEBETA) 5 MG Tab Take 1 Tab by mouth every day. 90 Tab 3   • lactose Powder Take 50 g by mouth Once.     • Probiotic Product (PROBIOTIC DAILY PO) Take  by mouth.     • ipratropium (ATROVENT) 0.06 % Solution   11   • FLUTICASONE FUROATE NA [place]  in nose.     • ferrous gluconate (IRON 27) 240 (27 Fe) MG tablet Take 240 mg by mouth every day.     • spironolactone (ALDACTONE) 25 MG Tab Take 0.5 Tabs by mouth every day. 30 Tab 3   • furosemide  "(LASIX) 40 MG Tab Take 1 Tab by mouth 2 times a day. 60 Tab 11   • metOLazone (ZAROXOLYN) 2.5 MG Tab Take 1 Tab by mouth 1 time daily as needed. 30 Tab 11   • potassium chloride SA (KDUR) 20 MEQ Tab CR Take 1 Tab by mouth 2 times a day. 60 Tab 11   • benazepril (LOTENSIN) 20 MG Tab Take 1 Tab by mouth every evening. 90 Tab 3   • rosuvastatin (CRESTOR) 40 MG tablet Take 1 Tab by mouth every day. 90 Tab 3   • XOPENEX HFA 45 MCG/ACT inhaler Inhale 1 Puff by mouth every four hours as needed.  1   • Cholecalciferol (VITAMIN D) 2000 UNITS Cap Take  by mouth.     • Umeclidinium-Vilanterol (ANORO ELLIPTA) 62.5-25 MCG/INH AEROSOL POWDER, BREATH ACTIVATED Take 1 Inhalation by mouth every day. 1 Each 11   • nitroglycerin (NITROSTAT) 0.4 MG SL Tab Place 1 Tab under tongue as needed for Chest Pain. 25 Tab 5   • aspirin (ASA) 81 MG Chew Tab chewable tablet Take 1 Tab by mouth every day. 90 Tab 3   • tamsulosin (FLOMAX) 0.4 MG capsule Take 0.4 mg by mouth every day.       Facility-Administered Encounter Medications as of 5/23/2018   Medication Dose Route Frequency Provider Last Rate Last Dose   • albuterol inhaler 2 Puff  2 Puff Inhalation Q4HRS PRN KALE Amos     Objective:   /70   Pulse 68   Ht 1.93 m (6' 4\")   Wt 98.9 kg (218 lb)   SpO2 95%   BMI 26.54 kg/m²      Physical Exam   Constitutional: He appears well-developed and well-nourished.   Neck: JVD (To the mid neck at 90°) present.   Cardiovascular: Normal rate and regular rhythm.    No murmur heard.  Pulmonary/Chest: Effort normal and breath sounds normal. He has no rales.   Abdominal: Soft. There is no tenderness.   Musculoskeletal: He exhibits edema (2-3+ pretibial).       Cardiac MRI:  IMPRESSIONS:    1. There is apparent mild transmural late gadolinium enhancement in the septum and possible inferior wall of the left ventricle, could relate to scarring. The evaluation is very limited due to artifact.    2. Reduced ejection fraction " of 40%.    3. Mild hypokinesis of the septal and inferior wall of the left ventricle.    4. Biatrial enlargement.    5. Tricuspid regurgitation.    7. No pericardial effusion or thickening. No pericardial calcification.   Reading Provider Reading Date   Alexander Uribe M.D. May 10, 2018       Transthoracic  Echo Report      Echocardiography Laboratory    CONCLUSIONS  The left ventricle was normal in size and thickness. Mildly reduced   left ventricular systolic function. Septal and inferior wall   hypokinesis. Left ventricular ejection fraction is visually estimated   to be 45-50%. Grade III diastolic dysfunction (restrictive pattern).   Abnormal septal motion consistent with right ventricular (RV) volume   overload and/or elevated RV end-diastolic pressure.  Moderately dilated right ventricle. Reduced right ventricular systolic   function.  Enlarged right atrium. Dilated inferior vena cava without inspiratory   collapse.  Borderline mildly dilated left atrium.  Structurally normal tricuspid valve without significant stenosis.   Moderate tricuspid regurgitation. Estimated right ventricular systolic   pressure  is 68 mmHg. Possibly severe TR with TR jet wrapping around   posterior aspect of RA.  Compared to the report of the study done 3/2015- the right side is now   dilated and the estimated RVSP has increased. Probable severe TR is   present.    FABIANA WHITMAN  Exam Date:         04/12/2018    Laboratory from May 18: Sodium 142, potassium 4.9, BUN 34 and creatinine 1.4.  Estimated GFR 53.  So is the metolazone 2 mg a day as needed    Assessment:     1. Coronary artery disease due to calcified coronary lesion: CABG ×5 in 2009     2. HTN (hypertension), benign     3. Dyslipidemia     4. Dyspnea on exertion     5. ACC/AHA stage C systolic heart failure (HCC)         Medical Decision Making:  Today's Assessment / Status / Plan:     Mr. Whitman continues to have difficulty with edema.  He does appear volume overloaded  with JVD and peripheral edema.  Some clear whether or not this is due to his left heart failure or pulmonary hypertension from his underlying lung disease.  In any event, we will try increasing his diuretic therapy slightly.  He will take a half a tablet of metolazone daily.  If his weight drops below 210 pounds, he will reduce metolazone to one half tablet every other day.  He will follow-up in a couple weeks with a BMP.      Skyla Foy M.D.  [address] [place] [id number]  VIA Facsimile: [phone]

## 2018-06-04 NOTE — TELEPHONE ENCOUNTER
Patient wants call back about weight being down since taking Lasix   Received: Today   Message Contents   GUERA Hensley/Laurie     Patient said that Dr Dixon gave his Lasix to get his weight down to 208 pounds. He said that he's below 208 pounds and wants a call back at [phone].      Returned patient call. Patient has considerable medication confusion. Pt has already adjusted his metolazone 1/2 tab to every other day. Wants to know if he should continue. Discussed continuation of MD orders until 6/11 visit. Pt states he is feeling well except BP drops in the evening. Discussed which medcations he takes and when. Pt will start taking Benazepril in AM and see if this helps. Discussed hydration. Pt states he drinks approx 16 oz of water per day. Pt encouraged to increase hydration to 2L per day. Pt very appreciative of long duration of call and assistance with helping him figure out what meds to take when.

## 2018-06-06 NOTE — TELEPHONE ENCOUNTER
"Patient returning call   Received: Today   Message Contents   GUERA Hensley/Laurie     Patient is returning your call and can be reached on his cell phone at [phone].      Returned patient call. Pt states he is confused about some medications still. We review medications again as we did on 6/4. Pt states understanding.  Pt states he received the packet from R and \"it doesn't look good at all\". Pt wants a referral for R for testing to come from cardiology. Pt asked if he is referring to a referral for the [facility], pt states that is it. Long talk with patient about his mentation status. Pt encouraged to call his PCP about that referral. Pt states he doesn't want this news all around [facility]. Pt encouraged to consider obtaining a [facility] PCP at this time for this follow-up care. Pt states that is a definite consideration. Pt states how appreciative he is of my patient with him and understanding.   "

## 2018-06-11 NOTE — PROGRESS NOTES
Chief Complaint   Patient presents with   • CHF (Chronic)       Subjective:   Abbe Whitman is a 77 y.o. male who presents today for follow-up of his coronary artery disease and congestive heart failure. He has a history of prior bypass graft surgery.     At the time of his last visit, we did increase his diuretic therapy.  In general his weight has come down to 210 pounds or less.  He does note some improvement of his edema at night.  He continues to have significant edema during the day.    He continues have dyspnea on exertion at about 50-60 feet.  He said no PND orthopnea but is on CPAP therapy at night.    He continues have some difficulty with orthostatic lightheadedness but no chest discomfort or palpitations.        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 at SURGERY [facility]   • INGUINAL HERNIA REPAIR  9/15/2010    Performed by GRIS GARCÍA at SURGERY [facility]   • MULTIPLE CORONARY ARTERY BYPASS ENDO VEIN HARVEST  10/13/2009    Performed by JULIA ALCANTARA at SURGERY [facility]   • MAZE PROCEDURE  10/13/2009    Performed by JULIA ALCANTARA at SURGERY [facility]   •  BIOPSY GENERAL  10/13/2009    Performed by JULIA ALCANTARA at SUR[facility]ERY [facility]   • ANGIOGRAM  2009   • CATARACT EXTRACTION      both eyes   • OTHER      right knee orthoscopic    • OTHER CARDIAC SURGERY       Family History   Problem Relation Age of Onset   • Other Mother      afib   • Heart Attack Maternal Uncle      Social History     Social History   • Marital status:      Spouse name: N/A   • Number of children: N/A   • Years of education: N/A     Occupational History   • Not on file.     Social History Main Topics   • Smoking status: Never Smoker   • Smokeless tobacco: Never Used   • Alcohol use No   • Drug use: No   • Sexual activity: Not on file     Other Topics Concern   • Not on file     Social History Narrative   • No narrative on file     Allergies   Allergen Reactions   • Prednisone      High dose caused tightness in throat. Are okay with Medrol pack per patient   • Ciprofloxacin      Per patient makes skin peel    • Dairy Aid [Lactase]      LACTOSE INTOLERANT   • Gluten Meal      BLOATED AND SICK   • Latex      Son says slight skin reaction     Outpatient Encounter Prescriptions as of 6/11/2018   Medication Sig Dispense Refill   • Esomeprazole Magnesium (NEXIUM 24HR PO) Take  by mouth.     • bisoprolol (ZEBETA) 5 MG Tab Take 1 Tab by mouth every day. 90 Tab 3   • lactose Powder Take 50 g by mouth Once.     • Probiotic Product (PROBIOTIC DAILY PO) Take  by mouth.     • ipratropium (ATROVENT) 0.06 % Solution   11   • FLUTICASONE FUROATE NA [place]  in nose.     • ferrous gluconate (IRON 27) 240 (27 Fe) MG tablet Take 240 mg by mouth every day.     • spironolactone (ALDACTONE) 25 MG Tab Take 0.5 Tabs by mouth every day. 30 Tab 3   • furosemide (LASIX) 40 MG Tab Take 1 Tab by mouth 2 times a day. 60 Tab 11   • metOLazone (ZAROXOLYN) 2.5 MG Tab Take 1 Tab by mouth 1 time daily as needed. 30 Tab 11   • potassium chloride SA (KDUR) 20 MEQ Tab CR Take 1 Tab by mouth 2 times a day. (Patient taking  "differently: Take 10 mEq by mouth 4 times a day.) 60 Tab 11   • benazepril (LOTENSIN) 20 MG Tab Take 1 Tab by mouth every evening. 90 Tab 3   • rosuvastatin (CRESTOR) 40 MG tablet Take 1 Tab by mouth every day. 90 Tab 3   • XOPENEX HFA 45 MCG/ACT inhaler Inhale 1 Puff by mouth every four hours as needed.  1   • Cholecalciferol (VITAMIN D) 2000 UNITS Cap Take  by mouth.     • Umeclidinium-Vilanterol (ANORO ELLIPTA) 62.5-25 MCG/INH AEROSOL POWDER, BREATH ACTIVATED Take 1 Inhalation by mouth every day. 1 Each 11   • nitroglycerin (NITROSTAT) 0.4 MG SL Tab Place 1 Tab under tongue as needed for Chest Pain. 25 Tab 5   • aspirin (ASA) 81 MG Chew Tab chewable tablet Take 1 Tab by mouth every day. 90 Tab 3   • tamsulosin (FLOMAX) 0.4 MG capsule Take 0.4 mg by mouth every day.       Facility-Administered Encounter Medications as of 6/11/2018   Medication Dose Route Frequency Provider Last Rate Last Dose   • albuterol inhaler 2 Puff  2 Puff Inhalation Q4HRS PRN KALE Amos       Objective:   /82   Pulse 68   Ht 1.93 m (6' 4\")   Wt 97.5 kg (215 lb)   SpO2 99%   BMI 26.17 kg/m²     Physical Exam   Constitutional: He appears well-developed and well-nourished.   Neck: JVD (To the mid neck at 90°) present.   Cardiovascular: Normal rate and regular rhythm.    No murmur heard.  Pulmonary/Chest: Effort normal and breath sounds normal. He has no rales.   Abdominal: Soft. There is no tenderness.   Musculoskeletal: He exhibits edema (1+ pretibial).       Cardiac MRI:  IMPRESSIONS:    1. There is apparent mild transmural late gadolinium enhancement in the septum and possible inferior wall of the left ventricle, could relate to scarring. The evaluation is very limited due to artifact.    2. Reduced ejection fraction of 40%.    3. Mild hypokinesis of the septal and inferior wall of the left ventricle.    4. Biatrial enlargement.    5. Tricuspid regurgitation.    7. No pericardial effusion or " thickening. No pericardial calcification.   Reading Provider Reading Date   Alexander Uribe M.D. May 10, 2018       Transthoracic  Echo Report      Echocardiography Laboratory    CONCLUSIONS  The left ventricle was normal in size and thickness. Mildly reduced   left ventricular systolic function. Septal and inferior wall   hypokinesis. Left ventricular ejection fraction is visually estimated   to be 45-50%. Grade III diastolic dysfunction (restrictive pattern).   Abnormal septal motion consistent with right ventricular (RV) volume   overload and/or elevated RV end-diastolic pressure.  Moderately dilated right ventricle. Reduced right ventricular systolic   function.  Enlarged right atrium. Dilated inferior vena cava without inspiratory   collapse.  Borderline mildly dilated left atrium.  Structurally normal tricuspid valve without significant stenosis.   Moderate tricuspid regurgitation. Estimated right ventricular systolic   pressure  is 68 mmHg. Possibly severe TR with TR jet wrapping around   posterior aspect of RA.  Compared to the report of the study done 3/2015- the right side is now   dilated and the estimated RVSP has increased. Probable severe TR is   present.    FABIANA WHITMAN  Exam Date:         04/12/2018    Laboratory from May 18: Sodium 142, potassium 4.9, BUN 34 and creatinine 1.4.  Estimated GFR 53.      Laboratory from May 31: Sodium 142, potassium 4.0, CO2 42, BUN 34 and creatinine 1.6.    Assessment:     1. Coronary artery disease due to calcified coronary lesion: CABG ×5 in 2009     2. HTN (hypertension), benign     3. Dyslipidemia     4. Chronic respiratory failure with hypoxia (HCC)     5. ACC/AHA stage C systolic heart failure (HCC)         Medical Decision Making:  Today's Assessment / Status / Plan:     Mr. Whitman has had significant improvement in his edema.  His estimated GFR has fallen slightly.  We will continue him on his current dose of diuretic.  He does have an elevated CO2 level  and was asked to follow-up with pulmonary.  He will otherwise follow-up in about 2 months with repeat lab work.  His blood pressure appears to be under good control.  His dyspnea on exertion is stable and he continues on chronic oxygen therapy.

## 2018-06-11 NOTE — LETTER
[facility]   [address] [place] [address] [address]  [place] [address]  Phone: [phone]  Fax: [phone]              Abbe Whitman  1940    Encounter Date: 6/11/2018    Armin Dixon M.D.          PROGRESS NOTE:  Chief Complaint   Patient presents with   • CHF (Chronic)       Subjective:   Abbe Whitman is a 77 y.o. male who presents today for follow-up of his coronary artery disease and congestive heart failure. He has a history of prior bypass graft surgery.     At the time of his last visit, we did increase his diuretic therapy.  In general his weight is come down to 210 pounds or less.  He does note some improvement of his edema at night.  He continues to have significant edema during the day.    He continues have dyspnea on exertion at about 50-60 feet.  He said no PND orthopnea but is on CPAP therapy at night.    He continues have some difficulty with orthostatic lightheadedness but no chest discomfort or palpitations.        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 at [facility]   • INGUINAL HERNIA REPAIR  9/15/2010    Performed by GRIS GARCÍA at SURGERY [facility]   • MULTIPLE CORONARY ARTERY BYPASS ENDO VEIN HARVEST  10/13/2009    Performed by JULIA ALCANTARA at SURGERY [facility]   • MAZE PROCEDURE  10/13/2009    Performed by JULIA ALCANTARA at SUR[facility]ERY [facility]   • BIOPSY GENERAL  10/13/2009    Performed by JULIA ALCANTARA at SUR[facility]ERY [facility]   • ANGIOGRAM  2009   • CATARACT EXTRACTION      both eyes   • OTHER      right knee orthoscopic    • OTHER CARDIAC SURGERY       Family History   Problem Relation Age of Onset   • Other Mother      afib   • Heart Attack Maternal Uncle      Social History     Social History   • Marital status:      Spouse name: N/A   • Number of children: N/A   • Years of education: N/A     Occupational History   • Not on file.     Social History Main Topics   • Smoking status: Never Smoker   • Smokeless tobacco: Never Used   • Alcohol use No   • Drug use: No   • Sexual activity: Not on file     Other Topics Concern   • Not on file     Social History Narrative   • No narrative on file     Allergies   Allergen Reactions   • Prednisone      High dose caused tightness in throat. Are okay with Medrol pack per patient   • Ciprofloxacin      Per patient makes skin peel    • Dairy Aid [Lactase]      LACTOSE INTOLERANT   • Gluten Meal      BLOATED AND SICK   • Latex      Son says slight skin reaction     Outpatient Encounter Prescriptions as of 6/11/2018   Medication Sig Dispense Refill   • Esomeprazole Magnesium (NEXIUM 24HR PO) Take  by mouth.     • bisoprolol (ZEBETA) 5 MG Tab Take 1 Tab by mouth every day. 90 Tab 3   • lactose Powder Take 50 g by mouth Once.     • Probiotic Product (PROBIOTIC DAILY PO) Take  by mouth.     • ipratropium (ATROVENT) 0.06 % Solution   11   • FLUTICASONE FUROATE NA [place]  in nose.     • ferrous gluconate (IRON 27) 240 (27 Fe) MG tablet Take 240 mg by mouth every day.     • spironolactone (ALDACTONE) 25 MG Tab Take 0.5 Tabs by mouth every day. 30  "Tab 3   • furosemide (LASIX) 40 MG Tab Take 1 Tab by mouth 2 times a day. 60 Tab 11   • metOLazone (ZAROXOLYN) 2.5 MG Tab Take 1 Tab by mouth 1 time daily as needed. 30 Tab 11   • potassium chloride SA (KDUR) 20 MEQ Tab CR Take 1 Tab by mouth 2 times a day. (Patient taking differently: Take 10 mEq by mouth 4 times a day.) 60 Tab 11   • benazepril (LOTENSIN) 20 MG Tab Take 1 Tab by mouth every evening. 90 Tab 3   • rosuvastatin (CRESTOR) 40 MG tablet Take 1 Tab by mouth every day. 90 Tab 3   • XOPENEX HFA 45 MCG/ACT inhaler Inhale 1 Puff by mouth every four hours as needed.  1   • Cholecalciferol (VITAMIN D) 2000 UNITS Cap Take  by mouth.     • Umeclidinium-Vilanterol (ANORO ELLIPTA) 62.5-25 MCG/INH AEROSOL POWDER, BREATH ACTIVATED Take 1 Inhalation by mouth every day. 1 Each 11   • nitroglycerin (NITROSTAT) 0.4 MG SL Tab Place 1 Tab under tongue as needed for Chest Pain. 25 Tab 5   • aspirin (ASA) 81 MG Chew Tab chewable tablet Take 1 Tab by mouth every day. 90 Tab 3   • tamsulosin (FLOMAX) 0.4 MG capsule Take 0.4 mg by mouth every day.       Facility-Administered Encounter Medications as of 6/11/2018   Medication Dose Route Frequency Provider Last Rate Last Dose   • albuterol inhaler 2 Puff  2 Puff Inhalation Q4HRS PRN KALE Amos       Objective:   /82   Pulse 68   Ht 1.93 m (6' 4\")   Wt 97.5 kg (215 lb)   SpO2 99%   BMI 26.17 kg/m²      Physical Exam   Constitutional: He appears well-developed and well-nourished.   Neck: JVD (To the mid neck at 90°) present.   Cardiovascular: Normal rate and regular rhythm.    No murmur heard.  Pulmonary/Chest: Effort normal and breath sounds normal. He has no rales.   Abdominal: Soft. There is no tenderness.   Musculoskeletal: He exhibits edema (1+ pretibial).       Cardiac MRI:  IMPRESSIONS:    1. There is apparent mild transmural late gadolinium enhancement in the septum and possible inferior wall of the left ventricle, could relate to " scarring. The evaluation is very limited due to artifact.    2. Reduced ejection fraction of 40%.    3. Mild hypokinesis of the septal and inferior wall of the left ventricle.    4. Biatrial enlargement.    5. Tricuspid regurgitation.    7. No pericardial effusion or thickening. No pericardial calcification.   Reading Provider Reading Date   Alexander Uribe M.D. May 10, 2018       Transthoracic  Echo Report      Echocardiography Laboratory    CONCLUSIONS  The left ventricle was normal in size and thickness. Mildly reduced   left ventricular systolic function. Septal and inferior wall   hypokinesis. Left ventricular ejection fraction is visually estimated   to be 45-50%. Grade III diastolic dysfunction (restrictive pattern).   Abnormal septal motion consistent with right ventricular (RV) volume   overload and/or elevated RV end-diastolic pressure.  Moderately dilated right ventricle. Reduced right ventricular systolic   function.  Enlarged right atrium. Dilated inferior vena cava without inspiratory   collapse.  Borderline mildly dilated left atrium.  Structurally normal tricuspid valve without significant stenosis.   Moderate tricuspid regurgitation. Estimated right ventricular systolic   pressure  is 68 mmHg. Possibly severe TR with TR jet wrapping around   posterior aspect of RA.  Compared to the report of the study done 3/2015- the right side is now   dilated and the estimated RVSP has increased. Probable severe TR is   present.    FABIANA RODRIGUEZ  Exam Date:         04/12/2018    Laboratory from May 18: Sodium 142, potassium 4.9, BUN 34 and creatinine 1.4.  Estimated GFR 53.      Laboratory from May 31: Sodium 142, potassium 4.0, CO2 42, BUN 34 and creatinine 1.6.    Assessment:     1. Coronary artery disease due to calcified coronary lesion: CABG ×5 in 2009     2. HTN (hypertension), benign     3. Dyslipidemia     4. Chronic respiratory failure with hypoxia (HCC)     5. ACC/AHA stage C systolic heart failure  ([facility])         Medical Decision Making:  Today's Assessment / Status / Plan:     Mr. Whitman has had significant improvement in his edema.  His estimated GFR has fallen slightly.  We will continue him on his current dose of diuretic.  He does have an elevated CO2 level and was asked to follow-up with pulmonary.  He will otherwise follow-up in about 2 months with repeat lab work.  His blood pressure appears to be under good control.  His dyspnea on exertion is stable and he continues on chronic oxygen therapy.      Skyla Foy M.D.  [address] [place] [id number]  VIA Facsimile: [phone]

## 2018-06-20 NOTE — PROCEDURES
Multi-Ox Readings  Multi Ox #1 Room air   O2 sat % at rest 78   O2 sat % on exertion     O2 sat average on exertion     Multi Ox #2 2 LPM   O2 sat % at rest 98   O2 sat % on exertion 92   O2 sat average on exertion 94     Oxygen Use 3   Oxygen Frequency 24/7   Duration of need     Is the patient mobile within the home?     CPAP Use? auto cpap 8-15 with 3 LPM    BIPAP Use?     Servo Titration

## 2018-06-20 NOTE — PROGRESS NOTES
CC:  Here for f/u sleep and pulmonary issues as listed below    HPI:   Abbe presents today for follow up for emphysema and BRITTANY with new concerns of confusion. PFTs from 5/2017 have mild declined since 2015 with no change in the TLC and indicate a Fev1 of 1.79L or 46% predicted without bronchodilator response, Fev1/FVC ratio of 97, TLC 54%, DLCO 39%. He is never a smoker. CAT scan from 12/2015 showed trace loculated pleural effusion and bilateral subpleural bands of sub-segmental atelectasis and/or scarring, which could be related to his restrictive changes found on PFTs per Dr. Cevallos. I reviewed results of high-resolution CAT scan completed January 11, 2018 with no evidence for ILD.  Bilateral rounded atelectasis.  Chronic pleural inflammation with bilateral pleural thickening with a small loculated right pleural effusion.  Echocardiogram completed April 2018 showed an estimated ejection fraction to be 45-50%, grade 3 diastolic dysfunction, abnormal septal motion consistent with right ventricular volume overload, moderately dilated right ventricle, enlarged right atrium, moderate tricuspid regurgitation, estimated RVSP is 68 mmHg.  He is followed by Dr. Dixon, cardiology.   5+ years with smoke exposure. Denies birds, mold, hot tub in home, autoimmune, cancer agents.      Patient was trialed on Anoro, thought beneficial, stopped due to cost. He tried Breo, but had frequent thrush. Restarted Anoro with benefit, but then started having upset stomach and stopped again. Now uses Ventolin 2x/week.  Since last office visit he has been told his carbon dioxide has been rising and is getting worked up by his primary.  Venous CO2 from June 2018 was 42. he continues to use 24/7 oxygen at 3L and increase to 4L intermittently. He reports his oxygen is between 95-99% at home. We reviewed in great detail his oxygen should be between 89-94%.   Reviewed detriments of too much oxygen with his hx of emphysema.  We completed  updated Multi-ox today on room air he was 78%.  1 L at rest he was at a low of 94%.  2 L at rest he was 98% and with exertion desaturated to 92%.  Reviewed updated oxygen requirement in great detail with him and his wife.  He continues to have nasal congestion and cough with white mucus, from allergies; wheezing.  He denies hemoptysis, chest pain chest tightness, fevers or chills, acid reflux, morning headaches.      PSG from 5/2017 indicated an AHI of 33.9 and low oxygen of 76%.  Currently he is being treated with autoCPAP @ [id number] 3L.  Compliance download from the dates 5/21/2018 - 6/19/2018 indicates he is wearing the device 100% for an avg of 5 hours and 35 minutes per night with a reduced AHI of 0.4. Avg pressure is 14.8 with a high of 14.9.  He does tolerate pressure and mask well.  He wakes up refreshed and is less tired throughout the day. He continues to take a nap a few times improved since CPAP machine. He denies morning H/A and sleep better overall. He continues to wake in the middle of the night d/t nocturia and only sleeps appx 3 hours before he needs to wake. He will continue to clean supplies weekly and change them as insurance allows.           Patient Active Problem List    Diagnosis Date Noted   • Oxygen dependent 06/21/2018   • Restrictive lung disease 01/08/2018   • Chronic respiratory failure with hypoxia (HCC) 09/11/2017   • ACC/AHA stage C systolic heart failure (HCC) 05/17/2017   • Chronic obstructive pulmonary disease (HCC) 05/17/2017   • Pulmonary emphysema (HCC) 04/24/2017   • BRITTANY (obstructive sleep apnea) 04/24/2017   • Hypokalemia 03/16/2015   • Atypical chest pain 03/16/2015   • Chest pain 03/14/2015   • Pleural effusion 10/29/2013   • GERD (gastroesophageal reflux disease)    • Arrhythmia    • Myocardial infarct    • Dyspnea on exertion 02/19/2013   • Coronary artery disease due to calcified coronary lesion: CABG ×5 in 2009 08/31/2011   • HTN (hypertension), benign 08/31/2011   •  Dyslipidemia 08/31/2011       Past Medical History:   Diagnosis Date   • Angina    • Arrhythmia     a-fib occas   • Atypical chest pain 3/16/2015   • Ramsay esophagus    • CAD (coronary artery disease) 8/31/2011   • CATARACT 2013   • Cold 10/20    12 hrs of diarrhea    • 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 at SURGERY [facility]S   • INGUINAL HERNIA REPAIR  9/15/2010    Performed by GRIS GARCÍA at SURGERY [facility] OR[facility]   • MULTIPLE CORONARY ARTERY BYPASS ENDO VEIN HARVEST  10/13/2009    Performed by JULIA ALCANTARA at SUR[facility]ERY [facility]   • MAZE PROCEDURE  10/13/2009    Performed by JULIA ALCANTARA at SUR[facility]   • BIOPSY GENERAL  10/13/2009    Performed by JULIA ALCANTARA at SUR[facility]   • ANGIOGRAM  2009   • CATARACT EXTRACTION      both eyes   • OTHER      right knee orthoscopic    • OTHER CARDIAC SURGERY         Family History   Problem Relation Age of Onset   • Other Mother      afib   • Heart Attack Maternal Uncle        Social History     Social History   • Marital status:      Spouse name: N/A   • Number of children: N/A   • Years of education: N/A     Occupational History   • Not on file.     Social History Main Topics   • Smoking status: Never Smoker   • Smokeless tobacco: Never Used   • Alcohol use No   • Drug use: No   • Sexual activity: Not on file     Other Topics Concern   •  Not on file     Social History Narrative   • No narrative on file       Current Outpatient Prescriptions   Medication Sig Dispense Refill   • albuterol (VENTOLIN HFA) 108 (90 Base) MCG/ACT Aero Soln inhalation aerosol Inhale 2 Puffs by mouth every 6 hours as needed for Shortness of Breath.     • Esomeprazole Magnesium (NEXIUM 24HR PO) Take 40 mg by mouth.     • bisoprolol (ZEBETA) 5 MG Tab Take 1 Tab by mouth every day. 90 Tab 3   • lactose Powder Take 50 g by mouth Once.     • Probiotic Product (PROBIOTIC DAILY PO) Take  by mouth.     • ipratropium (ATROVENT) 0.06 % Solution   11   • FLUTICASONE FUROATE NA [place]  in nose.     • ferrous gluconate (IRON 27) 240 (27 Fe) MG tablet Take 240 mg by mouth every day.     • spironolactone (ALDACTONE) 25 MG Tab Take 0.5 Tabs by mouth every day. 30 Tab 3   • furosemide (LASIX) 40 MG Tab Take 1 Tab by mouth 2 times a day. 60 Tab 11   • metOLazone (ZAROXOLYN) 2.5 MG Tab Take 1 Tab by mouth 1 time daily as needed. 30 Tab 11   • potassium chloride SA (KDUR) 20 MEQ Tab CR Take 1 Tab by mouth 2 times a day. (Patient taking differently: Take 10 mEq by mouth 4 times a day.) 60 Tab 11   • benazepril (LOTENSIN) 20 MG Tab Take 1 Tab by mouth every evening. 90 Tab 3   • rosuvastatin (CRESTOR) 40 MG tablet Take 1 Tab by mouth every day. 90 Tab 3   • Cholecalciferol (VITAMIN D) 2000 UNITS Cap Take  by mouth.     • aspirin (ASA) 81 MG Chew Tab chewable tablet Take 1 Tab by mouth every day. 90 Tab 3   • tamsulosin (FLOMAX) 0.4 MG capsule Take 0.4 mg by mouth every day.     • XOPENEX HFA 45 MCG/ACT inhaler Inhale 1 Puff by mouth every four hours as needed.  1   • Umeclidinium-Vilanterol (ANORO ELLIPTA) 62.5-25 MCG/INH AEROSOL POWDER, BREATH ACTIVATED Take 1 Inhalation by mouth every day. (Patient not taking: Reported on 6/20/2018) 1 Each 11   • nitroglycerin (NITROSTAT) 0.4 MG SL Tab Place 1 Tab under tongue as needed for Chest Pain. 25 Tab 5     Current Facility-Administered Medications  "  Medication Dose Route Frequency Provider Last Rate Last Dose   • albuterol inhaler 2 Puff  2 Puff Inhalation Q4HRS PRN KALE Amos              Allergies: Prednisone; Ciprofloxacin; Dairy aid [lactase]; Gluten meal; and Latex      ROS   Gen: Denies fever, chills, unintentional weight loss, fatigue, night sweats  E/N/T: Denies ear pain,   Resp:Denies Dyspnea, sputum production, hemoptysis  CV: Denies chest pain, chest tightness, palpitations, BLE edema  Sleep:Denies morning headache, insomnia, daytime somnolence, snoring, gasping for air, apnea  Neuro: Denies frequent headaches, weakness, dizziness  GI: Denies N/V, acid reflux/heartburn  See HPI.  All other systems reviewed and negative      Vital signs for this encounter:  Vitals:    06/20/18 0906   Height: 1.93 m (6' 4\")   Weight: 95.3 kg (210 lb)   Weight % change since last entry.: 0 %   BP: 130/72   Pulse: 67   BMI (Calculated): 25.56   Resp: 16   Temp: 36.5 °C (97.7 °F)   O2 sat % on O2: 100 %   O2 Flow Rate (L/min): 3                 Physical Exam:   Appearance: well developed, well nourished, no acute distress.  Eyes: PERRL, EOM intact, sclere white, conjunctiva moist.  Ears: no lesions or deformities.  Hearing: grossly intact.  Nose: no lesions or deformities.  Dentition: good dentition.  Oropharynx: tongue normal, posterior pharynx without erythema or exudate.  Neck: supple, trachea midline, no masses.  Respiratory effort: no intercostal retractions or use of accessory muscles.  Lung auscultation: Bilateral diminished   Heart auscultation: no murmur, rub, or gallop.   Extremities: no cyanosis or edema.  Abdomen: soft, non-tender, no masses.  Gait and station: grossly normal   Digits and Nails: no clubbing, cyanosis, petechiae, or nodes.  Cranial nerves: grossly normal.  Motor: no focal deficits observed.  Skin: no rashes, lesions, or ulcers noted.  Orientation: oriented to time, place, and person.  Mood and affect: mood and affect " appropriate, normal interaction with examiner.    Assessment   1. Pulmonary emphysema, unspecified emphysema type (HCC)  AMB MULTIPLE OXIMETRY    DME CNOX BY DME CO    AMB MULTIPLE OXIMETRY   2. BRITTANY (obstructive sleep apnea)  DME CNOX BY DME CO   3. Oxygen dependent         Patient was seen for 30 minutes, more than 50% of time spent in face to face review, counseling, and arranging future evaluation and follow up of medical conditions and care related to COPD, co2 retention concerns, review in great detail detriments of too much oxygen and reviewed in great detail new oxygen requirements and BRITTANY. Patient is clinically stable and will proceed with following plan.  New concern for confusion could be related to co2 retention related to using too much oxygen during the day given emphysema hx or possibly a progression of his lung disease. Will also check CNOX to ensure adequate oxygenation while sleeping. Encourage continue follow up with primary for further work up for confusion.     PLAN:   Patient Instructions   1) Continue autoCPAP at [id number] with 3L of oxygen  2) Clean mask and supplies weekly and change them as insurance allows  3) Continue Anoro. Continue Xopenex rescue inhaler    4) Continue daily exercise   5) CNOX on current settings  6) Vaccines: Up to date with Prevnar and Pneumococcal, flu  6) Continue daytime oxygen at 1L at rest and 2L with exertion.  KEEP OXYGEN LEVELS BETWEEN 89-94%.   7) Return in about 2 months (around 8/20/2018) for review of symptoms, if not sooner, follow up with ALBERTO Valencia.

## 2018-06-20 NOTE — PATIENT INSTRUCTIONS
1) Continue autoCPAP at [id number] with 3L of oxygen  2) Clean mask and supplies weekly and change them as insurance allows  3) Continue Anoro. Continue Xopenex rescue inhaler    4) Continue daily exercise   5) CNOX on current settings  6) Vaccines: Up to date with Prevnar and Pneumococcal, flu  6) Continue daytime oxygen at 1L at rest and 2L with exertion.  KEEP OXYGEN LEVELS BETWEEN 89-94%.   7) Return in about 2 months (around 8/20/2018) for review of symptoms, if not sooner, follow up with ALBERTO Valencia.

## 2018-06-21 PROBLEM — Z99.81 OXYGEN DEPENDENT: Status: ACTIVE | Noted: 2018-01-01

## 2018-06-22 NOTE — TELEPHONE ENCOUNTER
Janine called and needed clarification on oxygen use. Told patient the specification laid out In carries last note. Patient said that he has been using 2.5 continues at day and night and said that he feels fine like that.

## 2018-06-28 PROBLEM — R65.21 SEPTIC SHOCK (HCC): Status: ACTIVE | Noted: 2018-01-01

## 2018-06-28 PROBLEM — A41.9 SEPSIS (HCC): Status: ACTIVE | Noted: 2018-01-01

## 2018-06-28 PROBLEM — E87.1 HYPONATREMIA: Status: ACTIVE | Noted: 2018-01-01

## 2018-06-28 PROBLEM — R60.1 ANASARCA: Status: ACTIVE | Noted: 2018-01-01

## 2018-06-28 PROBLEM — J96.20 ACUTE ON CHRONIC RESPIRATORY FAILURE (HCC): Status: ACTIVE | Noted: 2018-01-01

## 2018-06-28 PROBLEM — R18.8 ASCITES: Status: ACTIVE | Noted: 2018-01-01

## 2018-06-28 PROBLEM — A41.9 SEPTIC SHOCK (HCC): Status: ACTIVE | Noted: 2018-01-01

## 2018-06-28 PROBLEM — N17.9 ACUTE KIDNEY INJURY (HCC): Status: ACTIVE | Noted: 2018-01-01

## 2018-06-28 PROBLEM — I50.9 ACUTE ON CHRONIC CONGESTIVE HEART FAILURE (HCC): Status: ACTIVE | Noted: 2018-01-01

## 2018-06-28 NOTE — ED PROVIDER NOTES
ED Provider Note    CHIEF COMPLAINT  Chief Complaint   Patient presents with   • Abdominal Pain       HPI  Abbe Whitman is a 78 y.o. male here for evaluation of abdominal distention.  The patient states that he has been having issues over the last week, with pain, swelling of the legs, and nausea.  There is no back pain, and no headache.  At this time he was seen at an outlying facility from [place], and was sent here for further evaluation and a possible IR tap.  The patient was given antibiotics prior to arrival, and had a CT scan done as well.  He has been having some issues with low blood pressure, which is why they did not tap him an outlying facility.  He has no reported fever, and no vomiting.    PAST MEDICAL HISTORY   has a past medical history of Angina; Arrhythmia; Atypical chest pain (3/16/2015); Ramsay esophagus; CAD (coronary artery disease) (8/31/2011); CATARACT (2013); Cold (10/20); Dressler's syndrome (HCC) (2009); Dyslipidemia (8/31/2011); Dyspnea on exertion (2/19/2013); GERD (gastroesophageal reflux disease); Heart burn; HTN (hypertension) (8/31/2011); HTN (hypertension) (2013); Hypertension; Indigestion; Myocardial infarct (HCC) (2000); Other specified disorder of intestines (2013); Pleural effusion (7/1/2013); Sleep apnea; and Unspecified urinary incontinence.    SOCIAL HISTORY  Social History     Social History Main Topics   • Smoking status: Never Smoker   • Smokeless tobacco: Never Used   • Alcohol use No   • Drug use: No   • Sexual activity: Not on file       SURGICAL HISTORY   has a past surgical history that includes cataract extraction; other; multiple coronary artery bypass endo vein harvest (10/13/2009); maze procedure (10/13/2009); biopsy general (10/13/2009); angiogram (2009); inguinal hernia repair (9/15/2010); thoracoscopy (10/29/2013); and other cardiac surgery.    CURRENT MEDICATIONS  Home Medications    **Home medications have not yet been reviewed for this encounter**          ALLERGIES  Allergies   Allergen Reactions   • Prednisone      High dose caused tightness in throat. Are okay with Medrol pack per patient   • Ciprofloxacin      Per patient makes skin peel    • Dairy Aid [Lactase]      LACTOSE INTOLERANT   • Gluten Meal      BLOATED AND SICK   • Latex      Son says slight skin reaction       REVIEW OF SYSTEMS  See HPI for further details. Review of systems as above, otherwise all other systems are negative.     PHYSICAL EXAM  Constitutional: Well developed, well nourished.  Mild acute distress.  HEENT: Normocephalic, atraumatic. Posterior pharynx clear and moist.  Eyes:  EOMI. Normal sclera.  Neck: Supple, Full range of motion, nontender.  Chest/Pulmonary: clear to ausculation. Symmetrical expansion.   Cardio: Regular rate and rhythm with no murmur.   Abdomen: Distention, no peritoneal signs. No guarding. No palpable masses.  Musculoskeletal: No deformity, no edema, neurovascular intact.   Neuro: Clear speech, appropriate, cooperative, cranial nerves II-XII grossly intact.  Psych: Normal mood and affect    Results for orders placed or performed during the hospital encounter of 06/28/18   CBC WITH DIFFERENTIAL   Result Value Ref Range    WBC 10.5 4.8 - 10.8 K/uL    RBC 3.81 (L) 4.70 - 6.10 M/uL    Hemoglobin 12.0 (L) 14.0 - 18.0 g/dL    Hematocrit 38.1 (L) 42.0 - 52.0 %    .0 (H) 81.4 - 97.8 fL    MCH 31.5 27.0 - 33.0 pg    MCHC 31.5 (L) 33.7 - 35.3 g/dL    RDW 64.2 (H) 35.9 - 50.0 fL    Platelet Count 205 164 - 446 K/uL    MPV 11.6 9.0 - 12.9 fL    Neutrophils-Polys 66.90 44.00 - 72.00 %    Lymphocytes 0.00 (L) 22.00 - 41.00 %    Monocytes 6.10 0.00 - 13.40 %    Eosinophils 0.00 0.00 - 6.90 %    Basophils 0.00 0.00 - 1.80 %    Nucleated RBC 0.00 /100 WBC    Neutrophils (Absolute) 9.40 (H) 1.82 - 7.42 K/uL    Lymphs (Absolute) 0.00 (L) 1.00 - 4.80 K/uL    Monos (Absolute) 0.64 0.00 - 0.85 K/uL    Eos (Absolute) 0.00 0.00 - 0.51 K/uL    Baso (Absolute) 0.00 0.00 - 0.12  K/uL    NRBC (Absolute) 0.00 K/uL    Anisocytosis 1+     Macrocytosis 1+    COMP METABOLIC PANEL   Result Value Ref Range    Sodium 131 (L) 135 - 145 mmol/L    Potassium 5.3 3.6 - 5.5 mmol/L    Chloride 92 (L) 96 - 112 mmol/L    Co2 32 20 - 33 mmol/L    Anion Gap 7.0 0.0 - 11.9    Glucose 135 (H) 65 - 99 mg/dL    Bun 85 (HH) 8 - 22 mg/dL    Creatinine 2.98 (H) 0.50 - 1.40 mg/dL    Calcium 8.5 8.5 - 10.5 mg/dL    AST(SGOT) 22 12 - 45 U/L    ALT(SGPT) 9 2 - 50 U/L    Alkaline Phosphatase 48 30 - 99 U/L    Total Bilirubin 2.0 (H) 0.1 - 1.5 mg/dL    Albumin 2.9 (L) 3.2 - 4.9 g/dL    Total Protein 6.0 6.0 - 8.2 g/dL    Globulin 3.1 1.9 - 3.5 g/dL    A-G Ratio 0.9 g/dL   LIPASE   Result Value Ref Range    Lipase 16 11 - 82 U/L   TROPONIN   Result Value Ref Range    Troponin I 0.02 0.00 - 0.04 ng/mL   LACTIC ACID   Result Value Ref Range    Lactic Acid 1.6 0.5 - 2.0 mmol/L   ESTIMATED GFR   Result Value Ref Range    GFR If African American 25 (A) >60 mL/min/1.73 m 2    GFR If Non African American 21 (A) >60 mL/min/1.73 m 2   DIFFERENTIAL MANUAL   Result Value Ref Range    Bands-Stabs 22.60 (H) 0.00 - 10.00 %    Metamyelocytes 3.50 %    Myelocytes 0.90 %    Manual Diff Status PERFORMED    PERIPHERAL SMEAR REVIEW   Result Value Ref Range    Peripheral Smear Review see below    PLATELET ESTIMATE   Result Value Ref Range    Plt Estimation Normal    MORPHOLOGY   Result Value Ref Range    RBC Morphology Present     Poikilocytosis 2+     Ovalocytes 1+     Echinocytes 2+      ECHOCARDIOGRAM COMP W/O CONT    (Results Pending)     PROCEDURES     MEDICAL RECORD  I have reviewed patient's medical record and pertinent results are listed above.    COURSE & MEDICAL DECISION MAKING  I have reviewed any medical record information, laboratory studies and radiographic results as noted above.    4:21 PM  Dr. John will admit the pt.  All labs reviewed from outlying facility.  Ct reviewed, labs etc.  Pt was given zosyn pta, and currently  has no pain    CRITICAL CARE  The very real possibility of a deterioration of this patient's condition required the highest level of my preparedness for sudden, emergent intervention.  I provided critical care services, which included medication orders, frequent reevaluations of the patient's condition and response to treatment, ordering and reviewing test results, and discussing the case with various consultants.  The critical care time associated with the care of the patient was 35 minutes. Review chart for interventions. This time is exclusive of any other billable procedures.        Differential diagnoses include but not limited to:  sbo, peritonitis, liver failure,       FINAL IMPRESSION  1. Other ascites    2.       Critical care 35 minutes     Electronically signed by: Sandeep Gillis, 6/28/2018 4:14 PM

## 2018-06-28 NOTE — ED NOTES
Neutrophil band % and and metamyelocyte % critical results taken from  [facility], results reported to Dr Gillis.

## 2018-06-28 NOTE — ED NOTES
Present to ED because of fall: NO  Age >70: YES  Altered Mental Status: NO  Impaired Mobility: YES  Nursing Judgment: NO    Interventions:  Familiarize patient  Call light within reach  Stretcher in low position/brakes on  Yellow socks/armband  Green triangle in place  Floor surfaces clean  Uncluttered  Assess Hourly

## 2018-06-28 NOTE — ED NOTES
Pt bib [facility] from [place] ED. Pt recent hospital x1 week in [place], sent home and returned for BLE edema, SOB, and abdominal pain. Pt with large ascites noted, incontinent of loose stool x2 on arrival. Pt hypotensive on arrival, given 500ml bolus pta. Pt with zosyn infusing on arrival.

## 2018-06-29 NOTE — CARE PLAN
Problem: Fluid Volume:  Goal: Will maintain balanced intake and output  Outcome: NOT MET  Pt does not have any urine output, no fluids running, anasarca present     Problem: Pain Management  Goal: Pain level will decrease to patient's comfort goal  Outcome: PROGRESSING AS EXPECTED  Pt on comfort care, pts pain being controlled by meds, see MAR

## 2018-06-29 NOTE — ASSESSMENT & PLAN NOTE
CT of the abdomen revealed moderate ascites  Given tenderness concern for possible SBP  Continue current antibiotics  We will schedule paracentesis

## 2018-06-29 NOTE — PROCEDURES
Procedure Note    Date: 6/29/2018  Time: 0348 am    Procedure: Central Venous Line Placement   Site:Right IJ    Indication: Multiple pressors, cardiogenic shock, COPD exacerbation  Consent: Informed consent obtained from patient or designated decision maker after explaining the benefits/risks of the procedure including but not limited to bleeding, infection, nerve or other deep structure injury, pneumothorax/hemothorax, arrythmia, or death. Patient or surrogate expressed understanding and agreement and signed consent which can be found in the patient's chart.    Procedure:   After obtaining consent, a time-out was performed. Appropriate site confirmed with ultrasound and patient positioned, prepped, and draped in sterile fashion. All those present wearing cap and mask and those physically participating remained adhering to sterile fashion with cap, mask, gloves, and gown. 3 mL of local anesthetic injected (1% lidocaine without epinephrine) achieving appropriate comfort level for patient. Vein localized and accessed using ultrasound guidance/palpation  and 7 Fr 3 lumen catheter placed using Seldinger technique. Able to aspirate dark, non-pulsatile blood through each lumen and sterile saline flushed easily before capping. Line secured and dressed by RN. Patient tolerated procedure well without any difficulties and remains in care of bedside nurse. STAT P CXR will be performed to confirm appropriate placement for internal jugular or subclavian CVLs.    Medications:  1 mg Versed, 25 mcg Fentanyl.   EBL: minimal  Complications: None immediate  CXR: STAT PCXR shows line in good position.  No PTx.      Dalton Pabon D.O.  Critical Care Medicine

## 2018-06-29 NOTE — PALLIATIVE CARE
Palliative Care follow-up  POLST scanned into EMR, placed original in pt's chart.    Faxed hospice choice.        Thank you for allowing Palliative Care to participate in this patient's care. Please feel free to call [phone] with any questions or concerns.

## 2018-06-29 NOTE — ED NOTES
"Pt incontinent of small stool. Pt with redness to buttocks pta, states \"it's been red\". Pt also with redness not scrotum.   "

## 2018-06-29 NOTE — OR SURGEON
Immediate Post- Operative Note    PostOp Diagnosis: Ascites w/ loculation    Procedure(s): Bedside US guided paracentesis    Estimated Blood Loss: Less than 5 ml    Complications: None    6/29/2018     12:17 PM     Jeremiah Steven

## 2018-06-29 NOTE — DISCHARGE PLANNING
Agency/Facility Name: [facility]   Spoke To:    Outcome: admin rep was not available at the time and will give this CCA a call once referral is reviewed.

## 2018-06-29 NOTE — ASSESSMENT & PLAN NOTE
This is severe sepsis with the following associated acute organ dysfunction(s): acute kidney failure, acute respiratory failure.      Continue norepinephrine and vasopressin wean off as tolerated

## 2018-06-29 NOTE — DISCHARGE PLANNING
Medical SW    Referral: Sw advised by palliative they will complete hospice choice w/ pt's wife.    Sw spoke to hospitalist who will complete order for hospice.    Sw notified CCA as above.     Plan: Sw to assist w/ d/c planning as needed.

## 2018-06-29 NOTE — CONSULTS
"Reason for PC Consult: Advance Care Planning    Consulted by:   Dr. Pabon    Assessment:  General:   78 year old male admitted for anasarca on 6/28/18. Pt has a history of CHF, (EF 45% on 4/12/18), COPD (5L O2 at home), CAD, dyslipidemia, HTN, a-fib, angina, barrets esophagus, GERD, and sleep apnea. Pt presented to [facility] with dyspnea and increased swelling. Pt was transferred to [facility] for further evaluation.     Dyspnea: No, 100% on 5L NC  Last BM: 06/29/18   Pain: No  Depression: No  Dementia: Unable to Determine       Spiritual:  Is Buddhism or spirituality important for coping with this illness? No, Zhanna declined visit from   Has a  or spiritual provider visit been requested? No    Palliative Performance Scale: 30%    Advance Directive: Advance Directive    DPOA: Yes, Zhanna Whitman ([phone])' 1st Alt Edgardo Whitman; 2nd Alt Mercedes Whitman  POLST: None on File     Code Status: DNR      Outcome:  PC RN visited pt and spouse at bedside, pt's spouse Zhanna requested to speak in private as pt is very confused. PC RN and Zhanna met in conference room, PC RN introduced self and role of PC. Zhanna states \"His doctor told me to get him in hospice. Zhanna states she would like to have pt discharge to a SNF with hospice as she has seen a decline over the last several months in functional status, not being able to care for himself, and increase in confusion. Pt has also stopped eating as much food as he had in the past, Zhanna states he isn't interested in it as much. PC RN discussed hospice in detail, Zhanna verbalized understanding and wants to make sure pt is ready for discharge, then be made comfortable.     Zhanna would like pt to continue receiving treatment, finish antibiotics and get drained before he is discharge to hospice. Zhanna wants to make sure pt is well enough and will be the most comfortable. PC RN validated Zhanna's thoughts and feelings. Zhanna asked for pt to be sent " to the SNF in [place] with [facility], if that SNF is full then she would like pt to go to the SNF in [place] with hospice, and if that is full then any SNF in the [facility] area with hospice. Zhanna has a place to stay at all three places and can be with pt.     PC RN discussed GOC, if pt would decline in clinical condition and pt is unable to be discharged, Zhanna stated that she understands that pt might not survive this hospital stay and is OK with comfort measures if pt's clinical picture declines.     Reviewed pt's AD, discussed POLST form, Zhanna willing to complete form for pt. Form complete, will scan into EMR.    Obtained hospice choice for [facility].    Active listening, validation, education, and emotional support provided.     Updated:   IDT Rounds    Plan:   If stable, discharge to SNF for hospice, if clinical deterioration then comfort measures. POLST complete.    Recommendations: I recommend a hospice consult.    Thank you for allowing Palliative Care to participate in this patient's care. Please feel free to call [phone] with any questions or concerns.

## 2018-06-29 NOTE — DISCHARGE PLANNING
Received Choice form at [phone]  Agency/Facility Name: [facility]   Referral sent per Choice form @ [phone] due to pending Hospice order in work queue.

## 2018-06-29 NOTE — THERAPY
Occupational Therapy orders received. Per nursing, pt is now on comfort care. Plan for hospice. OT eval not indicated. D/C OT order. Nursing to re-order should condition change and pt becomes appropriate for therapy assessment. Dana Jarrett, OTR/L

## 2018-06-29 NOTE — CONSULTS
DATE OF SERVICE:  06/29/2018    Patient is seen in room #T608 ([place] [address]).    CHIEF COMPLAINT:  Hypotension.    HISTORY OF PRESENT ILLNESS:  This is a 78-year-old male with a known history   of severe cardiogenic compromise with low EF with a history of CABG, severe   COPD that presents to the hospital on 06/28/2018 with abdominal pain and   swelling.  The patient has significant diastolic and systolic heart failure   with EF of known 45% and grade III diastolic dysfunction with coronary artery   disease, status post CABG.  Patient has known history of BRITTANY, has been   transferred from another facility for higher level of care.  The patient had   been having swelling of his legs and shortness of breath over the last week   with fluid overload.  He has been coughing.  He has not been taking his Lasix   and Xarelto; however.  Patient states his abdomen is also becoming more   distended with fluid.  He was seen at an outside facility at [place] and had   a CT of the abdomen that showed pulmonary edema and effusion as well as   ascites.  Patient was transferred for possible thoracentesis, although he does   have severe COPD and moderate effusion with predominantly bullous emphysema.    He has had some nausea without vomiting.  He denies history of alcoholism or   cirrhosis.  He is a do not resuscitated and do not intubate patient.  However,   patient was seen by hospitalist service where patient was placed on   norepinephrine at max dose at this point dobutamine now at 20 mcg per kilo per   minute, and recently was ordered vasopressin.  Prior to that, he did receive   up to 5 liters of fluid at the outlying institution as they were concerned   about possible sepsis.  The patient is on 3 liters nasal cannula and does have   accessory muscle use with swelling of his abdomen and shortness of breath,   but is not in severe respiratory distress.  Nevertheless, I was asked by Dr. Dean, the on-call hospitalist to evaluate  patient for consultation and   central line placement since he is now on high dose pressor support.    MEDICATIONS:  The patient's medications listed included albuterol, probiotic,   ipratropium bromide, ferrous sulfate, spironolactone, Zaroxolyn, potassium,   Lotensin, Crestor, vitamin D, Anoro Ellipta, aspirin and Flomax.    PAST SURGICAL HISTORY:  Includes thoracoscopy, inguinal herniorrhaphy in 2010,   coronary artery bypass in 10/2009, maze procedure in 2009 and an angiogram.    He has had cataract extraction and right knee arthroscopic surgery.    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    SOCIAL HISTORY:  He is not a smoker, no alcohol use.    FAMILY HISTORY:  Mother has atrial fibrillation and the maternal uncle with MI   in the past.    ALLERGIES:  LISTED ARE PREDNISONE, CIPRO, LACTOSE, GLUTEN AND LASIX.    PHYSICAL EXAMINATION:  VITAL SIGNS:  The patient initially in the ED had temperature of 97.7 with a   pulse of 92 and blood pressure 83/40.  GENERAL/NECK:  He had rales on exam.  He has significantly decreased breath   sounds on the right compared to the left with only minimal jugular venous   distention; however, external jugular venous distention is noted.  He has   cervical stenosis, unable to extend the neck very well.  HEENT:  The pupils were equal and reactive to light.  He does not have   icterus.  No epistaxis.  The oropharynx was slightly dry.  HEART:  Irregularly irregular and tachycardic, on dobutamine and PMI is   laterally displaced.  Midline sternotomy scar is noted.  LUNGS:  The patient earlier had accessory muscle use noted.  Right sided  Rhonchi with crackles are noted.  There is also crackles of the left lung as well.    ABDOMEN:  Shows abdominal ascites with fluid wave.  There is no flank   ecchymosis.  EXTREMITIES:  Show pitting edema up to the mid thigh bilaterally.  He does   have SCDs in place.  He has poor capillary refill of the upper and lower   extremities.  Right now, peripheral IVs, but right IJ central catheter has   been placed.    NEUROLOGIC:  Patient is alert and following commands, and moving extremities,   But very weak, and barely moves lower extremites well, other than toes due to edema.   He is alert, but was given some sedation for CVP placement.     LABORATORY DATA:  Obtained earlier his white cell count was 10.5, H and H is   12 and 38 with platelet count 205.  His sodium 131, potassium 5.3, chloride   92, CO2 is 32 with a BUN of 85, creatinine of 2.98, calcium of 9.5.  AST 22,   ALT of 9, ALP of 48 with T-bili of 2.  Lipase of 16.  Lactic acid is 1.6,   repeat was 1.5.  Troponin was 0.02.  PT was 16.2 with INR 1.33 with a PTT of   41.7.  Blood gas performed at 0236 on 06/29/2018, pH 7.24, pCO2 of 69 with a   PaO2 of 117, bicarbonate of 30.4.  Cortisol level checked before was 76,   procalcitonin 4.98.  TSH of 1.290.  Chest x-ray I reviewed shows evidence of   large pleural effusion with significant COPD bilaterally with bullous   emphysematous changes, midline sternotomy wires and scar noted.  He had   osteoarthritic changes.  Large infiltrate on the right is not ruled out in   addition to the pleural fluid left, also has minor pleural effusion as well   with COPD and bullous emphysematous changes.    I did review echo from back in 4/18, showing the EF of 45% with biventricular   failure and grade III diastolic dysfunction.    ASSESSMENT AND PLAN:  1.  Acute hypoxemic respiratory failure.  This is predominantly due to I   believe fluid overload in the setting of congestive heart failure and probable   aggressive resuscitation initially.  2.  Acute pneumonia.  3.  Biventricular heart failure with poor right  and left ventricular function.  4.  Severe chronic obstructive pulmonary disease, on home oxygen.  5.  Acute kidney injury with elevated creatinine of 2.98 and BUN as well.  6.  Probable septic shock.  7.  Ascites with fluid overload and anasarca.  8.  Hyponatremia due to hypervolemia.  9.  Obstructive sleep apnea.  10.  Coronary artery disease with coronary artery bypass graft x5 in 2009 with   previous Dressler's syndrome.  11.  Possible urinary tract infection with wbc's of 10-20, negative ketones   and nitrites.  12.  Mild medication-induced coagulopathy.    PLAN:  1.  Patient is do not resuscitate, do not intubate, but is agreeable to   central line placement.  At this point, I would decrease IV fluids, start   vasopressin support, discontinue dobutamine such a high rate of 20, which may   be contributing to the hypotension and decreased the dosage to approximately   5-7.5 mcg per kilo per minute.  2.  Consider addition of phenylephrine considering his tachycardia.  3.  Continue with Levophed infusion.  4.  Broad-spectrum antibiotics.  5.  We would hold off on thoracentesis in this condition of respiratory   insufficiency.  6.  Cautious with further fluids at this point.  7.  Avoid nephrotoxins as possible.  8.  Follow up lactic acid has been ordered.  9.  Continue blood cultures.  10.  Echocardiogram.  11.  Follow troponins.  12.  CVP monitoring.    Patient remains critically ill and palliative care appears appropriate in my   opinion.    Critical care time so far is 45 minutes not including procedures.       ____________________________________     DO DARRION Hudson / FRANCY    DD:  06/29/2018 04:14:35  DT:  06/29/2018 05:01:25    D#:  [id number]  Job#:  [id number]

## 2018-06-29 NOTE — DISCHARGE PLANNING
Agency/Facility Name: [facility]   Spoke To: Jasmin  Outcome: Per Jasmin , Admin rep [place] was not available. CCA will have to place follow up call for referral status.

## 2018-06-29 NOTE — ED NOTES
Med rec complete per pt and family at bedside  Allergies reviewed   Pt denies taking ABX in last month  Pt and family report pt has not had his medications in >2 days

## 2018-06-29 NOTE — THERAPY
Spoke with RN this pm. Pt is on Comfort Care and is supposed to DC home or to SNF on hospice. No skilled PT needs identified at this time. RN advised to reorder if change in status occurs. Thank you for allowing us to participate in this pt's care.

## 2018-06-29 NOTE — PROGRESS NOTES
Received report from Edda VILLA. Patient to room at 1752, transported by RN. RN at bedside. Orders reviewed and implemented.

## 2018-06-29 NOTE — CARE PLAN
Problem: Safety  Goal: Will remain free from falls  Outcome: PROGRESSING AS EXPECTED    Intervention: Assess risk factors for falls  Risk factors assessed  Intervention: Implement fall precautions   06/28/18 6429   OTHER   Environmental Precautions Treaded Slipper Socks on Patient;Report Given to Other Health Care Providers Regarding Fall Risk;Mobility Assessed & Appropriate Sign Placed;Personal Belongings, Wastebasket, Call Bell etc. in Easy Reach;Bed in Low Position;Communication Sign for Patients & Families;Transferred to [facility] Side   Chair/Bed Strip Alarm Yes - Alarm On   Bed Alarm Yes - Alarm On         Problem: Infection  Goal: Will remain free from infection  Outcome: PROGRESSING AS EXPECTED    Intervention: Assess signs and symptoms of infection  s/s assessed  Intervention: Implement standard precautions and perform hand washing before and after patient contact  Hand washing performed   Intervention: Assess for removal of potential routes of infection, such as IV, central line, intra-arterial or urinary catheters  Routes assessed

## 2018-06-29 NOTE — PROGRESS NOTES
[facility]    Date of Service: 2018    Chief Complaint  78 y.o. male admitted 2018 with anasarca CHF possible SBP     Interval Problem Update     dobutamine 5 and levo 30  On vasopressin 0.03   Confused this am RN reports that per wife confused at baseline  Afebrile  Anuric  5l NC (2.5 l at baseline)            Consultants/Specialty  Nephrology    Disposition  To be determined        Review of Systems   Unable to perform ROS: Mental status change      Physical Exam  Laboratory/Imaging   Hemodynamics  Temp (24hrs), Av.2 °C (97.2 °F), Min:36.1 °C (97 °F), Max:36.7 °C (98.1 °F)   Temperature: 36.1 °C (97 °F)  Pulse  Av.7  Min: 73  Max: 99 Heart Rate (Monitored): 93  NIBP: 104/44      Respiratory      Respiration: (!) 22, Pulse Oximetry: 94 %     Work Of Breathing / Effort: Mild  RUL Breath Sounds: Clear, RML Breath Sounds: Diminished, RLL Breath Sounds: Diminished, BALTA Breath Sounds: Clear, LLL Breath Sounds: Diminished    Fluids    Intake/Output Summary (Last 24 hours) at 18 0927  Last data filed at 18 0600   Gross per 24 hour   Intake          1549.27 ml   Output              260 ml   Net          1289.27 ml       Nutrition  Orders Placed This Encounter   Procedures   • Diet Order Cardiac, Diabetic     Standing Status:   Standing     Number of Occurrences:   1     Order Specific Question:   Diet:     Answer:   Cardiac [6]     Order Specific Question:   Diet:     Answer:   Diabetic [3]     Physical Exam   Constitutional: He appears well-developed and well-nourished.   Acutely ill-appearing   HENT:   Head: Normocephalic and atraumatic.   Mouth/Throat: No oropharyngeal exudate.   Eyes: Conjunctivae are normal. Pupils are equal, round, and reactive to light. Right eye exhibits no discharge. Left eye exhibits no discharge.   Neck: Neck supple. No JVD present. No tracheal deviation present.   Cardiovascular: Normal rate and regular rhythm.  Exam reveals no gallop and no  friction rub.    Murmur heard.  Pulmonary/Chest: Accessory muscle usage present. No respiratory distress. He has decreased breath sounds. He has no wheezes. He has rales. He exhibits no tenderness.   Abdominal: Soft. Bowel sounds are normal. He exhibits distension. There is tenderness (Generalized). There is no rebound.   Musculoskeletal: He exhibits edema. He exhibits no tenderness.   Neurological: He is alert. No cranial nerve deficit. He exhibits normal muscle tone.   Oriented ×1   Skin: Skin is warm and dry. He is not diaphoretic. No cyanosis. Nails show no clubbing.   Psychiatric: Cognition and memory are impaired. He expresses impulsivity.   Nursing note and vitals reviewed.      Recent Labs      06/28/18   1531  06/29/18   0525   WBC  10.5  11.9*   RBC  3.81*  3.59*   HEMOGLOBIN  12.0*  11.2*   HEMATOCRIT  38.1*  36.0*   MCV  100.0*  100.3*   MCH  31.5  31.2   MCHC  31.5*  31.1*   RDW  64.2*  65.1*   PLATELETCT  205  213   MPV  11.6  11.9     Recent Labs      06/28/18   1531  06/29/18   0525   SODIUM  131*  134*   POTASSIUM  5.3  4.7   CHLORIDE  92*  94*   CO2  32  31   GLUCOSE  135*  168*   BUN  85*  86*   CREATININE  2.98*  3.48*   CALCIUM  8.5  8.3*     Recent Labs      06/28/18   2118  06/29/18   0900   APTT  41.7*  44.0*   INR  1.33*   --                   Assessment/Plan     * Sepsis ([facility])- (present on admission)   Assessment & Plan    This is severe sepsis with the following associated acute organ dysfunction(s): acute kidney failure, acute respiratory failure.    Source possible SBP  Continue Zosyn and follow-up on cultures and paracentesis results        Acute on chronic respiratory failure (HCC)- (present on admission)   Assessment & Plan    Continue oxygen and RT protocol  Discussed with Dr. Garcia        Acute kidney injury ([facility])- (present on admission)   Assessment & Plan      Patient is anuric  Nephrology following          Septic shock ([facility])- (present on admission)   Assessment & Plan    This  is severe sepsis with the following associated acute organ dysfunction(s): acute kidney failure, acute respiratory failure.      Continue norepinephrine and vasopressin wean off as tolerated        Ascites- (present on admission)   Assessment & Plan    CT of the abdomen revealed moderate ascites  Given tenderness concern for possible SBP  Continue current antibiotics  We will schedule paracentesis        Acute on chronic congestive heart failure (HCC)- (present on admission)   Assessment & Plan    Combined systolic and diastolic    Follow-up on repeat echocardiogram  We will consult cardiology  Patient on dobutamine and pressors overall prognosis is guarded        Anasarca- (present on admission)   Assessment & Plan    Patient is anuric may need hemodialysis will discuss with nephrology and family        Hyponatremia- (present on admission)   Assessment & Plan    Mild continue to monitor        BRITTANY (obstructive sleep apnea)- (present on admission)   Assessment & Plan    On CPAP at night.        Coronary artery disease due to calcified coronary lesion: CABG ×5 in 2009- (present on admission)   Assessment & Plan    At CABG ×5 in 2009 and PCI with 1 stent a few years ago.          Quality-Core Measures   Reviewed items::  Labs reviewed, Medications reviewed and Radiology images reviewed  Hurley catheter::  Critically Ill - Requiring Accurate Measurement of Urinary Output  Central line in place:  Shock and Sepsis  DVT prophylaxis pharmacological::  Heparin  Antibiotics:  Treating active infection/contamination beyond 24 hours perioperative coverage        Overall prognosis is extremely guarded will discuss goals of care with family  Palliative care consultation      Addendum    Family has decided to transition patient to comfort care  Will ask for hospice referral

## 2018-06-29 NOTE — H&P
Hospital Medicine History and Physical    Date of Service  6/28/2018    Chief Complaint  Chief Complaint   Patient presents with   • Abdominal Pain       History of Presenting Illness  78 y.o. male with past medical history of combined systolic and diastolic congestive heart failure with ejection fraction of 45% and grade 3 diastolic dysfunction, coronary artery disease status post CABG, history of COPD and obstructive sleep apnea who was transferred from another facility due to abdominal swelling.  Patient stated that he developed increased swelling and shortness of breath over the last week which worsened over the course of time.  He is on Lasix and Zaroxolyn that he did not take for the last 2 days.  He stated that his abdomen started to get distended also over the last few days.  He was seen at outside facility from [place] where he had a CT scan of the chest that showed pulmonary edema and pulmonary effusion.  Also CT scan of the abdomen showed ascites.  Patient was transferred here for IR thoracentesis.  Patient also stated that he has been having nausea and vomited occasionally over the last 2 days and developed low-grade fever on the day of admission.  He also started having abdominal pain diffusely.  He denies chest pain.  He admits to dizziness and lightheadedness when seated from laying position.  Patient denies any history of alcohol abuse or liver cirrhosis.  He is DNR.  Primary Care Physician  Skyla Foy M.D.    Consultants  Nephrology  Critical care    Code Status  DNR    Review of Systems  Review of Systems   Constitutional: Positive for chills, fever and malaise/fatigue.   HENT: Negative for hearing loss and tinnitus.    Eyes: Negative for blurred vision and double vision.   Respiratory: Positive for cough and shortness of breath. Negative for hemoptysis and wheezing.    Cardiovascular: Negative for chest pain and palpitations.   Gastrointestinal: Positive for nausea and vomiting.    Genitourinary: Negative for dysuria and frequency.   Musculoskeletal: Negative for back pain, myalgias and neck pain.   Skin: Negative for rash.   Neurological: Positive for dizziness. Negative for tingling and headaches.   Endo/Heme/Allergies: Does not bruise/bleed easily.   Psychiatric/Behavioral: Negative for depression and suicidal ideas.        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 at SURGERY [facility] ORS   • INGUINAL HERNIA REPAIR  9/15/2010    Performed by GRIS GARCÍA at SURGERY [facility]   • MULTIPLE CORONARY ARTERY BYPASS ENDO VEIN HARVEST  10/13/2009    Performed by JULIA ALCANTARA at [facility] ORS   • MAZE PROCEDURE  10/13/2009    Performed by JULIA ALCANTARA at [facility] ORS   • BIOPSY GENERAL  10/13/2009    Performed by JULIA ALCANTARA at [facility] ORS   • ANGIOGRAM  2009   • CATARACT EXTRACTION      both eyes   • OTHER      right knee orthoscopic    • OTHER CARDIAC SURGERY         Medications  No current facility-administered medications on file prior to encounter.      Current Outpatient  Prescriptions on File Prior to Encounter   Medication Sig Dispense Refill   • albuterol (VENTOLIN HFA) 108 (90 Base) MCG/ACT Aero Soln inhalation aerosol Inhale 2 Puffs by mouth every 6 hours as needed for Shortness of Breath.     • Probiotic Product (PROBIOTIC DAILY PO) Take 1 Cap by mouth every day.     • ipratropium (ATROVENT) 0.06 % Solution Spray 1 Spray in nose every day.  11   • ferrous gluconate (IRON 27) 240 (27 Fe) MG tablet Take 240 mg by mouth every morning.     • spironolactone (ALDACTONE) 25 MG Tab Take 0.5 Tabs by mouth every day. 30 Tab 3   • metOLazone (ZAROXOLYN) 2.5 MG Tab Take 1 Tab by mouth 1 time daily as needed. 30 Tab 11   • potassium chloride SA (KDUR) 20 MEQ Tab CR Take 1 Tab by mouth 2 times a day. 60 Tab 11   • benazepril (LOTENSIN) 20 MG Tab Take 1 Tab by mouth every evening. 90 Tab 3   • rosuvastatin (CRESTOR) 40 MG tablet Take 1 Tab by mouth every day. 90 Tab 3   • Cholecalciferol (VITAMIN D) 2000 UNITS Cap Take 2,000 Units by mouth every evening.     • Umeclidinium-Vilanterol (ANORO ELLIPTA) 62.5-25 MCG/INH AEROSOL POWDER, BREATH ACTIVATED Take 1 Inhalation by mouth every day. 1 Each 11   • aspirin (ASA) 81 MG Chew Tab chewable tablet Take 1 Tab by mouth every day. 90 Tab 3   • tamsulosin (FLOMAX) 0.4 MG capsule Take 0.4 mg by mouth every evening.         Family History  Family History   Problem Relation Age of Onset   • Other Mother      afib   • Heart Attack Maternal Uncle        Social History  Social History   Substance Use Topics   • Smoking status: Never Smoker   • Smokeless tobacco: Never Used   • Alcohol use No       Allergies  Allergies   Allergen Reactions   • Prednisone      High dose caused tightness in throat. Are okay with Medrol pack per patient   • Ciprofloxacin      Per patient makes skin peel    • Dairy Aid [Lactase]      LACTOSE INTOLERANT   • Gluten Meal      BLOATED AND SICK   • Latex      Son says slight skin reaction        Physical Exam  Laboratory    Hemodynamics  Temp (24hrs), Av.5 °C (97.7 °F), Min:36.2 °C (97.2 °F), Max:36.7 °C (98.1 °F)   Temperature: 36.2 °C (97.2 °F)  Pulse  Av.9  Min: 73  Max: 93 Heart Rate (Monitored): 92  NIBP: (!) 83/39      Respiratory      Respiration: 20, Pulse Oximetry: 96 %     Work Of Breathing / Effort: Mild  RUL Breath Sounds: Clear, RML Breath Sounds: Diminished, RLL Breath Sounds: Diminished, BALTA Breath Sounds: Clear, LLL Breath Sounds: Diminished    Physical Exam   Constitutional: He is oriented to person, place, and time. He appears lethargic.   HENT:   Head: Normocephalic and atraumatic.   Eyes: Pupils are equal, round, and reactive to light. Left eye exhibits no discharge.   Neck: Normal range of motion. No tracheal deviation present. No thyromegaly present.   Cardiovascular: Normal rate and regular rhythm.  Exam reveals no friction rub.    Murmur heard.  Pulmonary/Chest: Accessory muscle usage present. Tachypnea noted. He is in respiratory distress. He has decreased breath sounds. He has rales.   Abdominal: He exhibits distension. There is tenderness (Diffuse tenderness.).   Musculoskeletal: He exhibits edema. He exhibits no deformity.   Neurological: He is oriented to person, place, and time. He appears lethargic.   Skin: He is diaphoretic. No erythema.   Psychiatric: He has a normal mood and affect.       Recent Labs      18   1531   WBC  10.5   RBC  3.81*   HEMOGLOBIN  12.0*   HEMATOCRIT  38.1*   MCV  100.0*   MCH  31.5   MCHC  31.5*   RDW  64.2*   PLATELETCT  205   MPV  11.6     Recent Labs      18   1531   SODIUM  131*   POTASSIUM  5.3   CHLORIDE  92*   CO2  32   GLUCOSE  135*   BUN  85*   CREATININE  2.98*   CALCIUM  8.5     Recent Labs      18   1531   ALTSGPT  9   ASTSGOT  22   ALKPHOSPHAT  48   TBILIRUBIN  2.0*   LIPASE  16   GLUCOSE  135*     Recent Labs      18   2118   APTT  41.7*   INR  1.33*             Lab Results   Component Value Date    TROPONINI 0.02 2018      Urinalysis:    Lab Results  Component Value Date/Time   SPECGRAVITY 1.013 10/16/2009 1130   GLUCOSEUR Trace (A) 10/16/2009 1130   KETONES Negative 10/16/2009 1130   NITRITE Negative 10/16/2009 1130   WBCURINE 10-20 (A) 10/16/2009 1130   RBCURINE 2-5 (A) 10/16/2009 1130   EPITHELCELL Few 10/16/2009 1130        Imaging  Reviewed   Assessment/Plan     I anticipate this patient will require at least 2 midnight stay for appropriate medical management.    * Sepsis (HCC)- (present on admission)   Assessment & Plan    This is severe sepsis with the following associated acute organ dysfunction(s): acute kidney failure, acute respiratory failure.  Suspecting possible spontaneous peritonitis as patient has diffuse tenderness on abdominal exam.  On broad-spectrum IV antibiotics.  Patient now is hypotensive and in septic shock.  On pressors with dobutamine for better kidney perfusion.  However blood pressure is not improving with max dose of dobutamine.  Will switch to Levophed.  Pancultures.  As needed IV hydration due to above.  Follow-up with culture results.        Acute on chronic respiratory failure (HCC)- (present on admission)   Assessment & Plan    Patient uses 2 L oxygen at home.  Currently on 3-1/2 L.  Developed worsening shortness of breath and orthopnea.  Continue to wean down oxygen requirements as tolerated.        Acute kidney injury (HCC)- (present on admission)   Assessment & Plan    Creatinine 2015 was normal.  Now with 2.98.  Nephrology on board.  Avoid nephrotoxins.  Renally dose all medications.        Septic shock (HCC)- (present on admission)   Assessment & Plan    This is severe sepsis with the following associated acute organ dysfunction(s): acute kidney failure, acute respiratory failure.  On pressors.        Ascites- (present on admission)   Assessment & Plan    CT scan at the other facility showed ascites.  Abdomen is very distended on exam.  Not good candidate for paracentesis for now as the  patient is hemodynamically unstable.        Acute on chronic congestive heart failure (HCC)- (present on admission)   Assessment & Plan    Patient has a history of combined systolic diastolic congestive heart failure with ejection fraction 45% and grade 3 diastolic dysfunction.  Now with anasarca along with pulmonary edema and ascites.  On Lasix 100 mg IV twice daily.  Ordered repeat echocardiogram.  Daily weights.  Monitor I's and O's.  Nephrology consulted.  Waiting on cardiology to call back.        Anasarca- (present on admission)   Assessment & Plan    This is likely secondary to acute on chronic congestive heart failure and acute kidney failure.  Nephrology consulted.  Recommended Lasix 100 mg IV twice daily.  Blood pressure is low.  Patient was started on dobutamine drip.        Hyponatremia- (present on admission)   Assessment & Plan    Continue to monitor.        BRITTANY (obstructive sleep apnea)- (present on admission)   Assessment & Plan    On CPAP at night.        Coronary artery disease due to calcified coronary lesion: CABG ×5 in 2009- (present on admission)   Assessment & Plan    At CABG ×5 in 2009 and PCI with 1 stent a few years ago.        Patient is critically ill.   The patient continues to have: Hemodynamic shock  The vital organ system that is affected is the: Cardiovascular, kidneys, respiratory.  If untreated there is a high chance of deterioration into: Cardiopulmonary arrest  And eventually death.   The critical care that I am providing today is: Started the patient on IV diuretics, pressors, and broad-spectrum antibiotics.  Discussing the tests results  with consultants and ICU staff.  The critical that has been undertaken is medically complex.   There has been no overlap in critical care time.   Critical Care Time not including procedures: 45 minutes    VTE prophylaxis: Heparin

## 2018-06-29 NOTE — PROGRESS NOTES
2 RN skin check completed.   -shins are dry and flaky   -skin tear to left elbow  -mepilex placed on sacrum, red but blanchable

## 2018-06-29 NOTE — ASSESSMENT & PLAN NOTE
This is severe sepsis with the following associated acute organ dysfunction(s): acute kidney failure, acute respiratory failure.    Source possible SBP  Continue Zosyn and follow-up on cultures and paracentesis results

## 2018-06-29 NOTE — DISCHARGE PLANNING
Medical SW    Sw spoke to palliative RN. Pt's wife wants pt to d/c w/ hospice to SNF in either [place], [place], or [place] areas.    Plan: Sw to assist w/ d/c planning as needed.

## 2018-06-29 NOTE — DISCHARGE PLANNING
Medical SW    Sw attended AM IDT Rounds.    RN reports, pt family at bedside, over night hypotensive, confused this AM, wife states at cognitive base line, will have parasentesis today, hill in place,     Palliative has completed POLST w/ pt's wife. She wants to give pt a chance to leave to home or SNF w/ Hospice. She also understands pt may pass here if he does not improve.     Plan: Sw to assist w/ d/c planning as needed.

## 2018-06-29 NOTE — ASSESSMENT & PLAN NOTE
Combined systolic and diastolic    Follow-up on repeat echocardiogram  We will consult cardiology  Patient on dobutamine and pressors overall prognosis is guarded

## 2018-06-30 LAB
ALBUMIN FLD-MCNC: 2.2 G/DL
BODY FLD TYPE: NORMAL

## 2018-06-30 NOTE — DISCHARGE SUMMARY
DATE OF ADMISSION:  2018    DATE OF DEATH:  2018    CAUSE OF DEATH:  Acute respiratory failure secondary to severe sepsis with   septic shock.    OTHER COMORBIDITIES:  1.  Acute kidney injury likely acute tubular necrosis secondary to sepsis and   shock.  2.   Spontaneous bacterial peritonitis.  3.  Acute-on-chronic combined systolic and diastolic congestive heart failure.  4.  Coronary artery disease.    PRESENTATION:  Please refer to the dictated H and P.    HOSPITAL COURSE:  The patient is a 78-year-old male transferred from an   outlying facility with a concern for possible SBP, renal failure and CHF.  He   was admitted to the intensive care unit.  He was hypotensive, on pressors.  He   was empirically started on antibiotics.  He was clinically deteriorating and   requiring significant pressor support.  After discussion with his family, they   elected to transition him to comfort care.  Comfort care measures were   initiated and the patient  shortly thereafter.       ____________________________________     MD SALUD VELÁSQUEZ / FRANCY    DD:  2018 07:26:51  DT:  2018 07:42:22   D#:  [id number]  Job#:  [id number]

## 2018-06-30 NOTE — PROGRESS NOTES
Came onto shift for report at 1845, day shift RN Lakesha notified me that the patient had just . Wife at bedside, spoke with her about situation to follow. Sons came to bedside at approximately 1920, they were updated as well.

## 2018-07-02 LAB
BACTERIA FLD AEROBE CULT: ABNORMAL
GRAM STN SPEC: ABNORMAL
SIGNIFICANT IND 70042: ABNORMAL
SITE SITE: ABNORMAL
SOURCE SOURCE: ABNORMAL

## 2024-04-11 NOTE — TELEPHONE ENCOUNTER
MRI results   Received: Today   Message Contents   Glo Burgos R.N.   Phone Number: [phone]             HARLEY/yeimy     Pt calling for results of MRI done 5/9, please call pt at .      Pt is scheduled to see Dr. Dixon this Wednesday 5/23. Pt is aware of this appt. Explained that detailed results and interpretation will be done by Dr. Dixon during his appt on Wednesday but discussed impression results. Explained there was nothing seriously concerning but there was a mild area of decreased blood flow that Dr. Dixon will have to determine if significant or not. Pt appreciated info.   
musculoskeletal